# Patient Record
Sex: FEMALE | Race: WHITE | Employment: FULL TIME | URBAN - METROPOLITAN AREA
[De-identification: names, ages, dates, MRNs, and addresses within clinical notes are randomized per-mention and may not be internally consistent; named-entity substitution may affect disease eponyms.]

---

## 2017-11-22 ENCOUNTER — HOSPITAL ENCOUNTER (EMERGENCY)
Age: 47
Discharge: HOME | End: 2017-11-22
Attending: EMERGENCY MEDICINE
Payer: COMMERCIAL

## 2017-11-22 VITALS
SYSTOLIC BLOOD PRESSURE: 136 MMHG | DIASTOLIC BLOOD PRESSURE: 85 MMHG | WEIGHT: 235 LBS | RESPIRATION RATE: 18 BRPM | TEMPERATURE: 97.9 F | OXYGEN SATURATION: 94 % | HEART RATE: 83 BPM | BODY MASS INDEX: 39.11 KG/M2

## 2017-11-22 DIAGNOSIS — N10 ACUTE PYELONEPHRITIS: Primary | ICD-10-CM

## 2017-11-22 LAB
-: ABNORMAL
AMORPHOUS: ABNORMAL
BACTERIA: ABNORMAL
BILIRUBIN URINE: ABNORMAL
CASTS UA: ABNORMAL /LPF
COLOR: YELLOW
COMMENT UA: ABNORMAL
CRYSTALS, UA: ABNORMAL /HPF
EPITHELIAL CELLS UA: ABNORMAL /HPF (ref 0–5)
GLUCOSE URINE: NEGATIVE
KETONES, URINE: ABNORMAL
LEUKOCYTE ESTERASE, URINE: ABNORMAL
MUCUS: ABNORMAL
NITRITE, URINE: POSITIVE
OTHER OBSERVATIONS UA: ABNORMAL
PH UA: 6 (ref 5–8)
PROTEIN UA: ABNORMAL
RBC UA: ABNORMAL /HPF (ref 0–2)
RENAL EPITHELIAL, UA: ABNORMAL /HPF
SPECIFIC GRAVITY UA: 1.02 (ref 1–1.03)
TRICHOMONAS: ABNORMAL
TURBIDITY: ABNORMAL
URINE HGB: NEGATIVE
UROBILINOGEN, URINE: NORMAL
WBC UA: ABNORMAL /HPF (ref 0–5)
YEAST: ABNORMAL

## 2017-11-22 PROCEDURE — 87086 URINE CULTURE/COLONY COUNT: CPT

## 2017-11-22 PROCEDURE — 81001 URINALYSIS AUTO W/SCOPE: CPT

## 2017-11-22 PROCEDURE — 87088 URINE BACTERIA CULTURE: CPT

## 2017-11-22 PROCEDURE — 96372 THER/PROPH/DIAG INJ SC/IM: CPT

## 2017-11-22 PROCEDURE — 87186 SC STD MICRODIL/AGAR DIL: CPT

## 2017-11-22 PROCEDURE — 6360000002 HC RX W HCPCS: Performed by: EMERGENCY MEDICINE

## 2017-11-22 PROCEDURE — 99283 EMERGENCY DEPT VISIT LOW MDM: CPT

## 2017-11-22 RX ORDER — CEFTRIAXONE 1 G/1
1 INJECTION, POWDER, FOR SOLUTION INTRAMUSCULAR; INTRAVENOUS ONCE
Status: COMPLETED | OUTPATIENT
Start: 2017-11-22 | End: 2017-11-22

## 2017-11-22 RX ORDER — ESTRADIOL 1 MG/1
1 TABLET ORAL DAILY
COMMUNITY
Start: 2017-11-07 | End: 2021-05-07

## 2017-11-22 RX ORDER — GLIMEPIRIDE 4 MG/1
4 TABLET ORAL 2 TIMES DAILY
COMMUNITY
Start: 2017-11-07 | End: 2021-05-07

## 2017-11-22 RX ORDER — NITROFURANTOIN 25; 75 MG/1; MG/1
100 CAPSULE ORAL 2 TIMES DAILY
Qty: 10 CAPSULE | Refills: 0 | Status: SHIPPED | OUTPATIENT
Start: 2017-11-22 | End: 2017-11-27

## 2017-11-22 RX ORDER — METHYLPHENIDATE HYDROCHLORIDE 20 MG/1
20 CAPSULE, EXTENDED RELEASE ORAL EVERY MORNING
COMMUNITY
End: 2018-01-10

## 2017-11-22 RX ADMIN — CEFTRIAXONE SODIUM 1 G: 1 INJECTION, POWDER, FOR SOLUTION INTRAMUSCULAR; INTRAVENOUS at 14:52

## 2017-11-22 ASSESSMENT — ENCOUNTER SYMPTOMS
ABDOMINAL PAIN: 0
BLOOD IN STOOL: 0
VOMITING: 0
BACK PAIN: 1

## 2017-11-22 ASSESSMENT — PAIN DESCRIPTION - ORIENTATION: ORIENTATION: LOWER;MID

## 2017-11-22 ASSESSMENT — PAIN DESCRIPTION - PAIN TYPE: TYPE: ACUTE PAIN

## 2017-11-22 ASSESSMENT — PAIN DESCRIPTION - LOCATION: LOCATION: BACK

## 2017-11-22 ASSESSMENT — PAIN SCALES - GENERAL: PAINLEVEL_OUTOF10: 7

## 2017-11-22 NOTE — ED PROVIDER NOTES
[employer] [facility]  [address] [place] [address]  Phone: [phone]  Fax: [phone]        Pt Name: Pepper Haile  MRN: [id number]  [place] 1970  Date of evaluation: 17      CHIEF COMPLAINT       Chief Complaint   Patient presents with    Back Pain         HISTORY OF PRESENT ILLNESS  (Location/Symptom, Timing/Onset, Context/Setting, Quality, Duration, Modifying Factors, Severity.)    Pepper Haile is a 50 y.o. female who presents With right lower back pain. The patient states she awoke with right lower back pain starting this morning. The patient denies any trauma. No blood in the urine or stool. The patient states that certain movements makes her pain worse nothing makes it better. The patient denies any dysuria, no fever no chills. Has a history of kidney stones but states this feels different than her previous kidney stones      REVIEW OF SYSTEMS    (2-9 systems for level 4, 10 or more for level 5)     Review of Systems   Constitutional: Negative for chills and fever. Gastrointestinal: Negative for abdominal pain, blood in stool and vomiting. Genitourinary: Negative for dysuria and hematuria. Musculoskeletal: Positive for back pain. PAST MEDICAL HISTORY    has a past medical history of Diabetes mellitus ([address] and Kidney stone. SURGICAL HISTORY      has a past surgical history that includes  section; Cholecystectomy; and Hysterectomy. CURRENT MEDICATIONS       Previous Medications    ESCITALOPRAM OXALATE (LEXAPRO PO)    Take  by mouth. ESTRADIOL (ESTRACE) 1 MG TABLET    Take 1 mg by mouth daily    GLIMEPIRIDE (AMARYL) 4 MG TABLET    Take 4 mg by mouth 2 times daily    INSULIN DETEMIR (LEVEMIR SC)    Inject  into the skin. METHYLPHENIDATE (RITALIN LA) 20 MG EXTENDED RELEASE CAPSULE    Take 20 mg by mouth every morning . ALLERGIES     is allergic to zithromax [azithromycin].     FAMILY HISTORY     has no family status information on file. family history is not on file. SOCIAL HISTORY      reports that she has been smoking. She has been smoking about 1.00 pack per day. She has never used smokeless tobacco. She reports that she drinks alcohol. She reports that she does not use drugs. PHYSICAL EXAM    (up to 7 for level 4, 8 or more for level 5)   INITIAL VITALS:  weight is 106.6 kg (235 lb). Her oral temperature is 97.9 °F (36.6 °C). Her blood pressure is 136/85 and her pulse is 83. Her respiration is 18 and oxygen saturation is 94%. Physical Exam   Constitutional: She appears well-developed and well-nourished. HENT:   Head: Normocephalic and atraumatic. Eyes: Conjunctivae are normal.   Neck: Normal range of motion. Neck supple. Cardiovascular: Normal rate, regular rhythm and normal heart sounds. Pulmonary/Chest: Effort normal. No respiratory distress. Patient is noted to have an occasional expiratory wheeze and scattered the patient states that she is just getting over cold is feeling much better his partners, or upper respiratory type symptoms   Abdominal: Soft. Bowel sounds are normal. She exhibits no distension and no mass. There is no tenderness. There is no rebound and no guarding. Musculoskeletal: Normal range of motion. Right sacroiliac area tenderness no signs or symptoms of cauda equina   Neurological: She is alert. GCS of 15 with no focal deficits appreciated   Skin: Skin is warm and dry. No rash noted. Psychiatric: She has a normal mood and affect. Nursing note and vitals reviewed. DIFFERENTIAL DIAGNOSIS/ MDM:     I will check a UA    DIAGNOSTIC RESULTS         RADIOLOGY:   Non-plain film images such as CT, Ultrasound and MRI are read by the radiologist. Plain radiographic images are visualized and the radiologist interpretations are reviewed as follows:         Interpretation per the Radiologist below, if available at the time of this note:     The patient refuses any imaging at this time    LABS:  Results for orders placed or performed during the hospital encounter of 11/22/17   UA W/REFLEX CULTURE   Result Value Ref Range    Color, UA YELLOW YEL    Turbidity UA TURBID (A) CLEAR    Glucose, Ur NEGATIVE NEG    Bilirubin Urine NEGATIVE  Verified by ictotest. (A) NEG    Ketones, Urine TRACE (A) NEG    Specific Gravity, UA 1.025 1.005 - 1.030    Urine Hgb NEGATIVE NEG    pH, UA 6.0 5.0 - 8.0    Protein, UA 1+ (A) NEG    Urobilinogen, Urine Normal NORM    Nitrite, Urine POSITIVE (A) NEG    Leukocyte Esterase, Urine TRACE (A) NEG    Urinalysis Comments NOT REPORTED    Microscopic Urinalysis   Result Value Ref Range    -          WBC, UA 50  0 - 5 /HPF    RBC, UA 2 TO 5 0 - 2 /HPF    Casts UA NOT REPORTED /LPF    Crystals UA NOT REPORTED NONE /HPF    Epithelial Cells UA 2 TO 5 0 - 5 /HPF    Renal Epithelial, Urine NOT REPORTED 0 /HPF    Bacteria, UA MANY (A) NONE    Mucus, UA 2+ (A) NONE    Trichomonas, UA NOT REPORTED NONE    Amorphous, UA NOT REPORTED NONE    Other Observations UA Culture ordered based on defined criteria. (A) NREQ    Yeast, UA NOT REPORTED NONE       The patient is refusing any blood work at this time    [address] [place] [address]:   Vitals:    Vitals:    11/22/17 1329   BP: 136/85   Pulse: 83   Resp: 18   Temp: 97.9 °F (36.6 °C)   TempSrc: Oral   SpO2: 94%   Weight: 106.6 kg (235 lb)     -------------------------  BP: 136/85, Temp: 97.9 °F (36.6 °C), Pulse: 83, Resp: 18      RE-EVALUATION:  The patient presents with back pain. The urine does show infection. I did recommend that we check some labs as well as a CT scan. Given that the patient has a history of stones which the patient politely refuses as the patient states that she needs to get things ready for the holidays.   The patient states that ever symptoms worsen, she will return to the emergency Department at that point The patient is agreeable to receiving an injection of antibiotics for her urinary

## 2017-11-22 NOTE — ED NOTES
Pt to ED with complaint of mid to lower back pain. Pt states that she woke up this am with the pain, she denies any injury or trauma. Pt denies any bowel or bladder difficulty. She reports a hx of kidney stones in the past and states that this feels different than her kidney stone pain. Pt shows no sign of distress. She states that movement makes the pain worse.  Pt has taken motrin and reports no relief of pain     Freddie Umana RN  11/22/17 7330

## 2017-11-22 NOTE — ED NOTES
Pt up to restroom, instructed on CC technique, verbalizes understanding     Alee Wong RN  11/22/17 9473

## 2017-11-24 ENCOUNTER — HOSPITAL ENCOUNTER (EMERGENCY)
Age: 47
Discharge: HOME | End: 2017-11-24
Attending: EMERGENCY MEDICINE
Payer: COMMERCIAL

## 2017-11-24 ENCOUNTER — APPOINTMENT (OUTPATIENT)
Dept: CT IMAGING | Age: 47
End: 2017-11-24
Payer: COMMERCIAL

## 2017-11-24 VITALS
DIASTOLIC BLOOD PRESSURE: 79 MMHG | BODY MASS INDEX: 41.65 KG/M2 | HEIGHT: 65 IN | TEMPERATURE: 97.9 F | RESPIRATION RATE: 18 BRPM | WEIGHT: 250 LBS | SYSTOLIC BLOOD PRESSURE: 157 MMHG | OXYGEN SATURATION: 96 % | HEART RATE: 86 BPM

## 2017-11-24 DIAGNOSIS — N10 ACUTE PYELONEPHRITIS: Primary | ICD-10-CM

## 2017-11-24 LAB
ABSOLUTE EOS #: 0.6 K/UL (ref 0–0.4)
ABSOLUTE IMMATURE GRANULOCYTE: ABNORMAL K/UL (ref 0–0.3)
ABSOLUTE LYMPH #: 4.9 K/UL (ref 1–4.8)
ABSOLUTE MONO #: 0.7 K/UL (ref 0.1–1.2)
ANION GAP SERPL CALCULATED.3IONS-SCNC: 13 MMOL/L (ref 9–17)
BASOPHILS # BLD: 1 % (ref 0–2)
BASOPHILS ABSOLUTE: 0.2 K/UL (ref 0–0.2)
BILIRUBIN URINE: NEGATIVE
BUN BLDV-MCNC: 11 MG/DL (ref 6–20)
BUN/CREAT BLD: ABNORMAL (ref 9–20)
CALCIUM SERPL-MCNC: 9.3 MG/DL (ref 8.6–10.4)
CHLORIDE BLD-SCNC: 102 MMOL/L (ref 98–107)
CO2: 28 MMOL/L (ref 20–31)
COLOR: YELLOW
COMMENT UA: NORMAL
CREAT SERPL-MCNC: 0.56 MG/DL (ref 0.5–0.9)
CULTURE: ABNORMAL
CULTURE: ABNORMAL
DIFFERENTIAL TYPE: ABNORMAL
EOSINOPHILS RELATIVE PERCENT: 4 % (ref 1–4)
GFR AFRICAN AMERICAN: >60 ML/MIN
GFR NON-AFRICAN AMERICAN: >60 ML/MIN
GFR SERPL CREATININE-BSD FRML MDRD: ABNORMAL ML/MIN/{1.73_M2}
GFR SERPL CREATININE-BSD FRML MDRD: ABNORMAL ML/MIN/{1.73_M2}
GLUCOSE BLD-MCNC: 112 MG/DL (ref 70–99)
GLUCOSE URINE: NEGATIVE
HCT VFR BLD CALC: 42 % (ref 36–46)
HEMOGLOBIN: 14.5 G/DL (ref 12–16)
IMMATURE GRANULOCYTES: ABNORMAL %
KETONES, URINE: NEGATIVE
LEUKOCYTE ESTERASE, URINE: NEGATIVE
LYMPHOCYTES # BLD: 33 % (ref 24–44)
Lab: ABNORMAL
MCH RBC QN AUTO: 32 PG (ref 26–34)
MCHC RBC AUTO-ENTMCNC: 34.5 G/DL (ref 31–37)
MCV RBC AUTO: 92.5 FL (ref 80–100)
MONOCYTES # BLD: 5 % (ref 2–11)
NITRITE, URINE: NEGATIVE
ORGANISM: ABNORMAL
PDW BLD-RTO: 12.2 % (ref 12.5–15.4)
PH UA: 6 (ref 5–8)
PLATELET # BLD: 336 K/UL (ref 140–450)
PLATELET ESTIMATE: ABNORMAL
PMV BLD AUTO: 6.7 FL (ref 6–12)
POTASSIUM SERPL-SCNC: 3.8 MMOL/L (ref 3.7–5.3)
PROTEIN UA: NEGATIVE
RBC # BLD: 4.54 M/UL (ref 4–5.2)
RBC # BLD: ABNORMAL 10*6/UL
SEG NEUTROPHILS: 57 % (ref 36–66)
SEGMENTED NEUTROPHILS ABSOLUTE COUNT: 8.5 K/UL (ref 1.8–7.7)
SODIUM BLD-SCNC: 143 MMOL/L (ref 135–144)
SPECIFIC GRAVITY UA: 1.02 (ref 1–1.03)
SPECIMEN DESCRIPTION: ABNORMAL
SPECIMEN DESCRIPTION: ABNORMAL
STATUS: ABNORMAL
TURBIDITY: CLEAR
URINE HGB: NEGATIVE
UROBILINOGEN, URINE: NORMAL
WBC # BLD: 14.9 K/UL (ref 3.5–11)
WBC # BLD: ABNORMAL 10*3/UL

## 2017-11-24 PROCEDURE — 6370000000 HC RX 637 (ALT 250 FOR IP): Performed by: PHYSICIAN ASSISTANT

## 2017-11-24 PROCEDURE — 85025 COMPLETE CBC W/AUTO DIFF WBC: CPT

## 2017-11-24 PROCEDURE — 99284 EMERGENCY DEPT VISIT MOD MDM: CPT

## 2017-11-24 PROCEDURE — 36415 COLL VENOUS BLD VENIPUNCTURE: CPT

## 2017-11-24 PROCEDURE — 74176 CT ABD & PELVIS W/O CONTRAST: CPT

## 2017-11-24 PROCEDURE — 80048 BASIC METABOLIC PNL TOTAL CA: CPT

## 2017-11-24 RX ORDER — SULFAMETHOXAZOLE AND TRIMETHOPRIM 800; 160 MG/1; MG/1
1 TABLET ORAL ONCE
Status: COMPLETED | OUTPATIENT
Start: 2017-11-24 | End: 2017-11-24

## 2017-11-24 RX ORDER — SULFAMETHOXAZOLE AND TRIMETHOPRIM 800; 160 MG/1; MG/1
1 TABLET ORAL 2 TIMES DAILY
Qty: 19 TABLET | Refills: 0 | Status: SHIPPED | OUTPATIENT
Start: 2017-11-24 | End: 2017-12-04

## 2017-11-24 RX ORDER — HYDROCODONE BITARTRATE AND ACETAMINOPHEN 5; 325 MG/1; MG/1
1 TABLET ORAL EVERY 8 HOURS PRN
Qty: 15 TABLET | Refills: 0 | Status: SHIPPED | OUTPATIENT
Start: 2017-11-24 | End: 2017-12-01

## 2017-11-24 RX ORDER — 0.9 % SODIUM CHLORIDE 0.9 %
1000 INTRAVENOUS SOLUTION INTRAVENOUS ONCE
Status: DISCONTINUED | OUTPATIENT
Start: 2017-11-24 | End: 2017-11-24

## 2017-11-24 RX ADMIN — SULFAMETHOXAZOLE AND TRIMETHOPRIM 1 TABLET: 800; 160 TABLET ORAL at 16:28

## 2017-11-24 ASSESSMENT — ENCOUNTER SYMPTOMS
ABDOMINAL PAIN: 0
SORE THROAT: 0
EYE REDNESS: 0
VOMITING: 0
NAUSEA: 0
EYE DISCHARGE: 0
COUGH: 0
SHORTNESS OF BREATH: 0

## 2017-11-24 ASSESSMENT — PAIN SCALES - GENERAL: PAINLEVEL_OUTOF10: 9

## 2017-11-24 ASSESSMENT — PAIN DESCRIPTION - LOCATION: LOCATION: BACK

## 2017-11-24 ASSESSMENT — PAIN DESCRIPTION - ORIENTATION: ORIENTATION: RIGHT

## 2017-11-24 NOTE — ED PROVIDER NOTES
[facility] ED  [address] [address]  Phone: [phone]  Fax: [phone]      Attending Physician Attestation    I performed a history and physical examination of the patient and discussed management with the mid level provider. I reviewed the mid level provider's note and agree with the documented findings and plan of care. Any areas of disagreement are noted on the chart. I was personally present for the key portions of any procedures. I have documented in the chart those procedures where I was not present during the key portions. I have reviewed the emergency nurses triage note. I agree with the chief complaint, past medical history, past surgical history, allergies, medications, social and family history as documented unless otherwise noted below. Documentation of the HPI, Physical Exam and Medical Decision Making performed by mid level providers is based on my personal performance of the HPI, PE and MDM. For Physician Assistant/ Nurse Practitioner cases/documentation I have personally evaluated this patient and have completed at least one if not all key elements of the E/M (history, physical exam, and MDM). Additional findings are as noted. CHIEF COMPLAINT       Chief Complaint   Patient presents with    Back Pain         HISTORY OF PRESENT ILLNESS    Evon Wilson is a 50 y.o. female who presents Complaining of right-sided flank pain. She was evaluated here couple days ago and diagnosed with pyelonephritis. She reports despite being on Macrobid, she feels that she is getting worse. Pain is getting worse and now it is more in the right flank. Pain on scale of 0-10 is a 9. PAST MEDICAL HISTORY    has a past medical history of Diabetes mellitus ([address] and Kidney stone. SURGICAL HISTORY      has a past surgical history that includes  section; Cholecystectomy; and Hysterectomy.     CURRENT MEDICATIONS       Previous Medications ESCITALOPRAM OXALATE (LEXAPRO PO)    Take  by mouth. ESTRADIOL (ESTRACE) 1 MG TABLET    Take 1 mg by mouth daily    GLIMEPIRIDE (AMARYL) 4 MG TABLET    Take 4 mg by mouth 2 times daily    INSULIN DETEMIR (LEVEMIR SC)    Inject  into the skin. METHYLPHENIDATE (RITALIN LA) 20 MG EXTENDED RELEASE CAPSULE    Take 20 mg by mouth every morning . NITROFURANTOIN, MACROCRYSTAL-MONOHYDRATE, (MACROBID) 100 MG CAPSULE    Take 1 capsule by mouth 2 times daily for 5 days       ALLERGIES     is allergic to zithromax [azithromycin]. FAMILY HISTORY     has no family status information on file. family history is not on file. SOCIAL HISTORY      reports that she has been smoking. She has been smoking about 1.00 pack per day. She has never used smokeless tobacco. She reports that she drinks alcohol. She reports that she does not use drugs. PHYSICAL EXAM     INITIAL VITALS:  height is 5' 5\" (1.651 m) and weight is 113.4 kg (250 lb). Her oral temperature is 97.9 °F (36.6 °C). Her blood pressure is 157/79 (abnormal) and her pulse is 86. Patient has moderate status CVA tenderness. She is next or wheeze in all lung fields which she reports is her baseline. Heart is regular. She is not toxic appearing. DIAGNOSTIC RESULTS     RADIOLOGY:   Non-plain film images such as CT, Ultrasound and MRI are read by the radiologist. Reford Chyle radiographic images are visualized and the radiologist interpretations are reviewed as follows:     Ct Abdomen Pelvis Wo Iv Contrast Additional Contrast? None    Result Date: 11/24/2017  EXAMINATION: CT OF THE ABDOMEN AND PELVIS WITHOUT CONTRAST 11/24/2017 3:55 pm TECHNIQUE: CT of the abdomen and pelvis was performed without the administration of intravenous contrast. Multiplanar reformatted images are provided for review. Dose modulation, iterative reconstruction, and/or weight based adjustment of the mA/kV was utilized to reduce the radiation dose to as low as reasonably achievable. COMPARISON: January 4, 2006 HISTORY: ORDERING SYSTEM PROVIDED HISTORY: right flank pain; Dx UTI on 11/22/17; H/O kidney stones TECHNOLOGIST PROVIDED HISTORY: Additional Contrast?->None Acuity: Acute Type of Exam: Initial Additional signs and symptoms: dx UTI x2 days ago Relevant Medical/Surgical History: sx 2 c-sections FINDINGS: Lower Chest: Clear Organs: There is a fat containing periumbilical hernia and ventral hernia. The liver, spleen, pancreas, and adrenals appear normal.  The gallbladder is not identified. Multiple punctate nonobstructing nephroliths are noted bilaterally. Left nephrostomy tube is been removed. Previously noted parenchymal hemorrhage has resolved. The bladder appears normal. GI/Bowel: The stomache,small bowel, and colon appear normal. The appendix appears normal pre Pelvis: Normal Peritoneum/Retroperitoneum: The abdominal aorta and iliac arteries are normal in caliber. There is no pathologic adenopathy. Bones/Soft Tissues: Normal     Bilateral punctate nonobstructing nephroliths. Otherwise no acute disease.        LABS:  Results for orders placed or performed during the hospital encounter of 11/24/17   CBC Auto Differential   Result Value Ref Range    WBC 14.9 (H) 3.5 - 11.0 k/uL    RBC 4.54 4.0 - 5.2 m/uL    Hemoglobin 14.5 12.0 - 16.0 g/dL    Hematocrit 42.0 36 - 46 %    MCV 92.5 80 - 100 fL    MCH 32.0 26 - 34 pg    MCHC 34.5 31 - 37 g/dL    RDW 12.2 (L) 12.5 - 15.4 %    Platelets 577 825 - 855 k/uL    MPV 6.7 6.0 - 12.0 fL    Differential Type NOT REPORTED     Seg Neutrophils 57 36 - 66 %    Lymphocytes 33 24 - 44 %    Monocytes 5 2 - 11 %    Eosinophils % 4 1 - 4 %    Basophils 1 0 - 2 %    Immature Granulocytes NOT REPORTED 0 %    Segs Absolute 8.50 (H) 1.8 - 7.7 k/uL    Absolute Lymph # 4.90 (H) 1.0 - 4.8 k/uL    Absolute Mono # 0.70 0.1 - 1.2 k/uL    Absolute Eos # 0.60 (H) 0.0 - 0.4 k/uL    Basophils # 0.20 0.0 - 0.2 k/uL    Absolute Immature Granulocyte NOT REPORTED 0.00 - 0.30 k/uL    WBC Morphology NOT REPORTED     RBC Morphology NOT REPORTED     Platelet Estimate NOT REPORTED    Basic Metabolic Panel   Result Value Ref Range    Glucose 112 (H) 70 - 99 mg/dL    BUN 11 6 - 20 mg/dL    CREATININE 0.56 0.50 - 0.90 mg/dL    Bun/Cre Ratio NOT REPORTED 9 - 20    Calcium 9.3 8.6 - 10.4 mg/dL    Sodium 143 135 - 144 mmol/L    Potassium 3.8 3.7 - 5.3 mmol/L    Chloride 102 98 - 107 mmol/L    CO2 28 20 - 31 mmol/L    Anion Gap 13 9 - 17 mmol/L    GFR Non-African American >60 >60 mL/min    GFR African American >60 >60 mL/min    GFR Comment          GFR Staging NOT REPORTED    UA W/REFLEX CULTURE   Result Value Ref Range    Color, UA YELLOW YEL    Turbidity UA CLEAR CLEAR    Glucose, Ur NEGATIVE NEG    Bilirubin Urine NEGATIVE NEG    Ketones, Urine NEGATIVE NEG    Specific Gravity, UA 1.025 1.005 - 1.030    Urine Hgb NEGATIVE NEG    pH, UA 6.0 5.0 - 8.0    Protein, UA NEGATIVE NEG    Urobilinogen, Urine Normal NORM    Nitrite, Urine NEGATIVE NEG    Leukocyte Esterase, Urine NEGATIVE NEG    Urinalysis Comments       Microscopic exam not performed based on chemical results unless requested in           [address] [place] [address] [place]:   Vitals:    Vitals:    11/24/17 1511   BP: (!) 157/79   Pulse: 86   Temp: 97.9 °F (36.6 °C)   TempSrc: Oral   Weight: 113.4 kg (250 lb)   Height: 5' 5\" (1.651 m)     -------------------------  BP: (!) 157/79, Temp: 97.9 °F (36.6 °C), Pulse: 86,        PERTINENT ATTENDING PHYSICIAN COMMENTS:    Exam and history is consistent with pyelonephritis. Results are consistent with pyelonephritis. We will change her antibiotic to Bactrim. She has been given a prescription for Norco.  She has been given a narcotic warning will be discharged. (Please note that portions of this note were completed with a voice recognition program.  Efforts were made to edit the dictations but occasionally words are mis-transcribed.)    Coelho MD, F.A.C.E.P.   Attending Emergency Medicine

## 2017-11-24 NOTE — ED PROVIDER NOTES
nonobstructing nephroliths are noted bilaterally.  Left  nephrostomy tube is been removed.  Previously noted parenchymal hemorrhage  has resolved. The bladder appears normal.    GI/Bowel: The stomache,small bowel, and colon appear normal. The appendix appears  normal pre    Pelvis: Normal    Peritoneum/Retroperitoneum: The abdominal aorta and iliac arteries are normal  in caliber. There is no pathologic adenopathy.     Bones/Soft Tissues: Normal              LABS:  Results for orders placed or performed during the hospital encounter of 11/24/17   CBC Auto Differential   Result Value Ref Range    WBC 14.9 (H) 3.5 - 11.0 k/uL    RBC 4.54 4.0 - 5.2 m/uL    Hemoglobin 14.5 12.0 - 16.0 g/dL    Hematocrit 42.0 36 - 46 %    MCV 92.5 80 - 100 fL    MCH 32.0 26 - 34 pg    MCHC 34.5 31 - 37 g/dL    RDW 12.2 (L) 12.5 - 15.4 %    Platelets 675 031 - 050 k/uL    MPV 6.7 6.0 - 12.0 fL    Differential Type NOT REPORTED     Seg Neutrophils 57 36 - 66 %    Lymphocytes 33 24 - 44 %    Monocytes 5 2 - 11 %    Eosinophils % 4 1 - 4 %    Basophils 1 0 - 2 %    Immature Granulocytes NOT REPORTED 0 %    Segs Absolute 8.50 (H) 1.8 - 7.7 k/uL    Absolute Lymph # 4.90 (H) 1.0 - 4.8 k/uL    Absolute Mono # 0.70 0.1 - 1.2 k/uL    Absolute Eos # 0.60 (H) 0.0 - 0.4 k/uL    Basophils # 0.20 0.0 - 0.2 k/uL    Absolute Immature Granulocyte NOT REPORTED 0.00 - 0.30 k/uL    WBC Morphology NOT REPORTED     RBC Morphology NOT REPORTED     Platelet Estimate NOT REPORTED    Basic Metabolic Panel   Result Value Ref Range    Glucose 112 (H) 70 - 99 mg/dL    BUN 11 6 - 20 mg/dL    CREATININE 0.56 0.50 - 0.90 mg/dL    Bun/Cre Ratio NOT REPORTED 9 - 20    Calcium 9.3 8.6 - 10.4 mg/dL    Sodium 143 135 - 144 mmol/L    Potassium 3.8 3.7 - 5.3 mmol/L    Chloride 102 98 - 107 mmol/L    CO2 28 20 - 31 mmol/L    Anion Gap 13 9 - 17 mmol/L    GFR Non-African American >60 >60 mL/min    GFR African American >60 >60 mL/min    GFR Comment          GFR Staging NOT ANGIE  11/24/17 2016

## 2017-11-29 ENCOUNTER — HOSPITAL ENCOUNTER (EMERGENCY)
Age: 47
Discharge: HOME | End: 2017-11-29
Attending: EMERGENCY MEDICINE
Payer: COMMERCIAL

## 2017-11-29 VITALS
BODY MASS INDEX: 41.65 KG/M2 | OXYGEN SATURATION: 93 % | DIASTOLIC BLOOD PRESSURE: 68 MMHG | WEIGHT: 250 LBS | SYSTOLIC BLOOD PRESSURE: 130 MMHG | TEMPERATURE: 97 F | HEIGHT: 65 IN | RESPIRATION RATE: 18 BRPM | HEART RATE: 86 BPM

## 2017-11-29 DIAGNOSIS — M54.50 ACUTE RIGHT-SIDED LOW BACK PAIN WITHOUT SCIATICA: Primary | ICD-10-CM

## 2017-11-29 LAB
BILIRUBIN URINE: NEGATIVE
COLOR: YELLOW
COMMENT UA: ABNORMAL
GLUCOSE URINE: ABNORMAL
KETONES, URINE: ABNORMAL
LEUKOCYTE ESTERASE, URINE: NEGATIVE
NITRITE, URINE: NEGATIVE
PH UA: 6 (ref 5–8)
PROTEIN UA: NEGATIVE
SPECIFIC GRAVITY UA: 1.02 (ref 1–1.03)
TURBIDITY: CLEAR
URINE HGB: NEGATIVE
UROBILINOGEN, URINE: NORMAL

## 2017-11-29 PROCEDURE — 99283 EMERGENCY DEPT VISIT LOW MDM: CPT

## 2017-11-29 ASSESSMENT — PAIN DESCRIPTION - LOCATION: LOCATION: BACK

## 2017-11-29 ASSESSMENT — PAIN DESCRIPTION - ORIENTATION: ORIENTATION: LOWER

## 2017-11-29 ASSESSMENT — PAIN SCALES - GENERAL: PAINLEVEL_OUTOF10: 3

## 2017-11-29 ASSESSMENT — PAIN DESCRIPTION - DESCRIPTORS: DESCRIPTORS: ACHING

## 2017-11-29 NOTE — ED PROVIDER NOTES
(NORCO) 5-325 MG PER TABLET    Take 1 tablet by mouth every 8 hours as needed for Pain . INSULIN DETEMIR (LEVEMIR SC)    Inject  into the skin. METHYLPHENIDATE (RITALIN LA) 20 MG EXTENDED RELEASE CAPSULE    Take 20 mg by mouth every morning . SULFAMETHOXAZOLE-TRIMETHOPRIM (BACTRIM DS) 800-160 MG PER TABLET    Take 1 tablet by mouth 2 times daily for 10 days       ALLERGIES     is allergic to zithromax [azithromycin]. FAMILY HISTORY     has no family status information on file. family history is not on file. SOCIAL HISTORY      reports that she has been smoking. She has been smoking about 1.00 pack per day. She has never used smokeless tobacco. She reports that she drinks alcohol. She reports that she does not use drugs. PHYSICAL EXAM     INITIAL VITALS:  height is 5' 5\" (1.651 m) and weight is 113.4 kg (250 lb). Her temporal temperature is 97 °F (36.1 °C). Her blood pressure is 130/68 and her pulse is 86. Her respiration is 18 and oxygen saturation is 93%. Constitutional: Alert, oriented x3, nontoxic, answering questions appropriately, acting properly for age, in no acute distress   HEENT: Extraocular muscles intact, mucus membranes moist,  ,   Neck: Trachea midline,    Cardiovascular: Regular rhythm and rate no S3, S4, or murmurs   Respiratory: Clear to auscultation bilaterally no wheezes, rhonchi, rales, no respiratory distress   Gastrointestinal: Soft, nontender, nondistended, positive bowel sounds. No rebound, rigidity, or guarding. Musculoskeletal: No extremity pain or swelling no midline back tenderness to palpation. Decreased range of motion lumbar spine. No ecchymosis or rash. Neurologic: Moving all 4 extremities without difficulty there are no gross focal neurologic deficits   Skin: Warm and dry     Physical Exam  DIFFERENTIAL DIAGNOSIS/ MDM:     No gross focal neurologic deficits. Low back pain with a history of pyelonephritis.   Will recheck urinalysis    DIAGNOSTIC RESULTS     EKG: All EKG's are interpreted by the Emergency Department Physician who either signs or Co-signs this chart in the absence of a cardiologist.        Not indicated unless otherwise documented above    LABS:  Results for orders placed or performed during the hospital encounter of 11/29/17   UA W/REFLEX CULTURE   Result Value Ref Range    Color, UA YELLOW YEL    Turbidity UA CLEAR CLEAR    Glucose, Ur TRACE (A) NEG    Bilirubin Urine NEGATIVE NEG    Ketones, Urine TRACE (A) NEG    Specific Gravity, UA 1.025 1.005 - 1.030    Urine Hgb NEGATIVE NEG    pH, UA 6.0 5.0 - 8.0    Protein, UA NEGATIVE NEG    Urobilinogen, Urine Normal NORM    Nitrite, Urine NEGATIVE NEG    Leukocyte Esterase, Urine NEGATIVE NEG    Urinalysis Comments       Microscopic exam not performed based on chemical results unless requested in       Not indicated unless otherwise documented above    RADIOLOGY:   I reviewed the radiologist interpretations:    No orders to display       Not indicated unless otherwise documented above    EMERGENCY DEPARTMENT COURSE:     The patient was given the following medications:  No orders of the defined types were placed in this encounter. Vitals:    Vitals:    11/29/17 1527   BP: 130/68   Pulse: 86   Resp: 18   Temp: 97 °F (36.1 °C)   TempSrc: Temporal   SpO2: 93%   Weight: 113.4 kg (250 lb)   Height: 5' 5\" (1.651 m)     -------------------------  /68   Pulse 86   Temp 97 °F (36.1 °C) (Temporal)   Resp 18   Ht 5' 5\" (1.651 m)   Wt 113.4 kg (250 lb)   SpO2 93%   BMI 41.60 kg/m²     Urinalysis negative. Continue antibiotics. Continue pain medicine. Work note given. He is needed return if worsening symptoms or any other concerns. I have reviewed the disposition diagnosis with the patient and or their family/guardian. I have answered their questions and given discharge instructions.  They voiced understanding of these instructions and did not have any further questions or complaints. CRITICAL CARE:    None    CONSULTS:    None    PROCEDURES:    None      OARRS Report if indicated    Attestation: The Prescription Monitoring Report for this patient was reviewed today. (Myles Khalil, DO)  Documentation: No signs of potential drug abuse or diversion identified. (Myles Khalil, DO)        FINAL IMPRESSION      1.  Acute right-sided low back pain without sciatica          DISPOSITION/PLAN   DISPOSITION Decision to Discharge      PATIENT REFERRED TO:  Amanda Paul, [address] [facility] [address]    Schedule an appointment as soon as possible for a visit in 3 days        DISCHARGE MEDICATIONS:  New Prescriptions    No medications on file       (Please note that portions of this note were completed with a voice recognition program.  Efforts were made to edit the dictations but occasionally words are mis-transcribed.)    [place] DO  Attending Emergency Physician       Melissa Nevarez DO  11/29/17 2279

## 2017-11-29 NOTE — ED NOTES
Dr. Quyen Kwong at bedside discussing plans for discharge.       [facility], RN  11/29/17 8906

## 2017-11-29 NOTE — ED NOTES
To ED c/o low back pain. Sts it has been on going. Was seen here last week et diagnosed with a \"kidney infection\". Sts she is taking norco for pain et continues her bactrim but has increased lower back pain when at work. Sts she has left work et need a a work note. Denies any increase in her pain since previous visit. Denies fever, vomiting, dysuria. Is alert, oriented. Skin warm, dry, pink. Resp nonlabored, reg.       Riana Fernandez RN  11/29/17 4622

## 2018-01-08 ENCOUNTER — HOSPITAL ENCOUNTER (OUTPATIENT)
Dept: GENERAL RADIOLOGY | Age: 48
Discharge: HOME | End: 2018-01-08
Payer: COMMERCIAL

## 2018-01-08 ENCOUNTER — HOSPITAL ENCOUNTER (OUTPATIENT)
Age: 48
Discharge: HOME | End: 2018-01-08
Payer: COMMERCIAL

## 2018-01-08 DIAGNOSIS — N20.0 KIDNEY STONE: ICD-10-CM

## 2018-01-08 PROCEDURE — 74018 RADEX ABDOMEN 1 VIEW: CPT

## 2018-01-10 PROBLEM — R35.0 FREQUENCY OF URINATION: Status: ACTIVE | Noted: 2018-01-10

## 2018-01-10 PROBLEM — N20.0 BILATERAL NEPHROLITHIASIS: Status: ACTIVE | Noted: 2018-01-10

## 2018-01-26 ENCOUNTER — HOSPITAL ENCOUNTER (EMERGENCY)
Age: 48
Discharge: HOME | End: 2018-01-26
Attending: EMERGENCY MEDICINE
Payer: COMMERCIAL

## 2018-01-26 VITALS
OXYGEN SATURATION: 96 % | HEART RATE: 84 BPM | HEIGHT: 65 IN | TEMPERATURE: 97.9 F | SYSTOLIC BLOOD PRESSURE: 148 MMHG | WEIGHT: 250 LBS | BODY MASS INDEX: 41.65 KG/M2 | RESPIRATION RATE: 18 BRPM | DIASTOLIC BLOOD PRESSURE: 87 MMHG

## 2018-01-26 DIAGNOSIS — R73.9 HYPERGLYCEMIA, UNSPECIFIED: Primary | ICD-10-CM

## 2018-01-26 LAB
ABSOLUTE EOS #: 0 K/UL (ref 0–0.4)
ABSOLUTE IMMATURE GRANULOCYTE: ABNORMAL K/UL (ref 0–0.3)
ABSOLUTE LYMPH #: 2.4 K/UL (ref 1–4.8)
ABSOLUTE MONO #: 0.5 K/UL (ref 0.1–1.2)
ANION GAP SERPL CALCULATED.3IONS-SCNC: 18 MMOL/L (ref 9–17)
BASOPHILS # BLD: 1 % (ref 0–2)
BASOPHILS ABSOLUTE: 0.1 K/UL (ref 0–0.2)
BETA-HYDROXYBUTYRATE: 0.09 MMOL/L (ref 0.02–0.27)
BUN BLDV-MCNC: 22 MG/DL (ref 6–20)
BUN/CREAT BLD: ABNORMAL (ref 9–20)
CALCIUM SERPL-MCNC: 10 MG/DL (ref 8.6–10.4)
CHLORIDE BLD-SCNC: 93 MMOL/L (ref 98–107)
CO2: 23 MMOL/L (ref 20–31)
CREAT SERPL-MCNC: 0.78 MG/DL (ref 0.5–0.9)
DIFFERENTIAL TYPE: ABNORMAL
EOSINOPHILS RELATIVE PERCENT: 0 % (ref 1–4)
GFR AFRICAN AMERICAN: >60 ML/MIN
GFR NON-AFRICAN AMERICAN: >60 ML/MIN
GFR SERPL CREATININE-BSD FRML MDRD: ABNORMAL ML/MIN/{1.73_M2}
GFR SERPL CREATININE-BSD FRML MDRD: ABNORMAL ML/MIN/{1.73_M2}
GLUCOSE BLD-MCNC: 421 MG/DL (ref 65–105)
GLUCOSE BLD-MCNC: 534 MG/DL (ref 70–99)
GLUCOSE BLD-MCNC: 551 MG/DL (ref 65–105)
HCT VFR BLD CALC: 46.6 % (ref 36–46)
HEMOGLOBIN: 15.4 G/DL (ref 12–16)
IMMATURE GRANULOCYTES: ABNORMAL %
LYMPHOCYTES # BLD: 14 % (ref 24–44)
MCH RBC QN AUTO: 31 PG (ref 26–34)
MCHC RBC AUTO-ENTMCNC: 33 G/DL (ref 31–37)
MCV RBC AUTO: 94.1 FL (ref 80–100)
MONOCYTES # BLD: 3 % (ref 2–11)
NRBC AUTOMATED: ABNORMAL PER 100 WBC
PDW BLD-RTO: 12.8 % (ref 12.5–15.4)
PLATELET # BLD: 257 K/UL (ref 140–450)
PLATELET ESTIMATE: ABNORMAL
PMV BLD AUTO: 7.5 FL (ref 6–12)
POTASSIUM SERPL-SCNC: 4.8 MMOL/L (ref 3.7–5.3)
RBC # BLD: 4.95 M/UL (ref 4–5.2)
RBC # BLD: ABNORMAL 10*6/UL
SEG NEUTROPHILS: 82 % (ref 36–66)
SEGMENTED NEUTROPHILS ABSOLUTE COUNT: 13.9 K/UL (ref 1.8–7.7)
SODIUM BLD-SCNC: 134 MMOL/L (ref 135–144)
WBC # BLD: 17 K/UL (ref 3.5–11)
WBC # BLD: ABNORMAL 10*3/UL

## 2018-01-26 PROCEDURE — 99284 EMERGENCY DEPT VISIT MOD MDM: CPT

## 2018-01-26 PROCEDURE — 96372 THER/PROPH/DIAG INJ SC/IM: CPT

## 2018-01-26 PROCEDURE — 82010 KETONE BODYS QUAN: CPT

## 2018-01-26 PROCEDURE — 36415 COLL VENOUS BLD VENIPUNCTURE: CPT

## 2018-01-26 PROCEDURE — 85025 COMPLETE CBC W/AUTO DIFF WBC: CPT

## 2018-01-26 PROCEDURE — 82947 ASSAY GLUCOSE BLOOD QUANT: CPT

## 2018-01-26 PROCEDURE — 80048 BASIC METABOLIC PNL TOTAL CA: CPT

## 2018-01-26 PROCEDURE — 6370000000 HC RX 637 (ALT 250 FOR IP): Performed by: EMERGENCY MEDICINE

## 2018-01-26 PROCEDURE — 2580000003 HC RX 258: Performed by: EMERGENCY MEDICINE

## 2018-01-26 RX ORDER — 0.9 % SODIUM CHLORIDE 0.9 %
1000 INTRAVENOUS SOLUTION INTRAVENOUS ONCE
Status: COMPLETED | OUTPATIENT
Start: 2018-01-26 | End: 2018-01-26

## 2018-01-26 RX ORDER — CEPHALEXIN 500 MG/1
500 CAPSULE ORAL 4 TIMES DAILY
COMMUNITY
End: 2021-05-07

## 2018-01-26 RX ORDER — PREDNISONE 20 MG/1
20 TABLET ORAL 2 TIMES DAILY
COMMUNITY
End: 2021-05-07

## 2018-01-26 RX ADMIN — INSULIN HUMAN 20 UNITS: 100 INJECTION, SOLUTION PARENTERAL at 11:53

## 2018-01-26 RX ADMIN — SODIUM CHLORIDE 1000 ML: 9 INJECTION, SOLUTION INTRAVENOUS at 11:53

## 2018-01-26 NOTE — ED PROVIDER NOTES
mmol/L    Potassium 4.8 3.7 - 5.3 mmol/L    Chloride 93 (L) 98 - 107 mmol/L    CO2 23 20 - 31 mmol/L    Anion Gap 18 (H) 9 - 17 mmol/L    GFR Non-African American >60 >60 mL/min    GFR African American >60 >60 mL/min    GFR Comment          GFR Staging NOT REPORTED    CBC Auto Differential   Result Value Ref Range    WBC 17.0 (H) 3.5 - 11.0 k/uL    RBC 4.95 4.0 - 5.2 m/uL    Hemoglobin 15.4 12.0 - 16.0 g/dL    Hematocrit 46.6 (H) 36 - 46 %    MCV 94.1 80 - 100 fL    MCH 31.0 26 - 34 pg    MCHC 33.0 31 - 37 g/dL    RDW 12.8 12.5 - 15.4 %    Platelets 497 501 - 233 k/uL    MPV 7.5 6.0 - 12.0 fL    NRBC Automated NOT REPORTED per 100 WBC    Differential Type NOT REPORTED     Seg Neutrophils 82 (H) 36 - 66 %    Lymphocytes 14 (L) 24 - 44 %    Monocytes 3 2 - 11 %    Eosinophils % 0 (L) 1 - 4 %    Basophils 1 0 - 2 %    Immature Granulocytes NOT REPORTED 0 %    Segs Absolute 13.90 (H) 1.8 - 7.7 k/uL    Absolute Lymph # 2.40 1.0 - 4.8 k/uL    Absolute Mono # 0.50 0.1 - 1.2 k/uL    Absolute Eos # 0.00 0.0 - 0.4 k/uL    Basophils # 0.10 0.0 - 0.2 k/uL    Absolute Immature Granulocyte NOT REPORTED 0.00 - 0.30 k/uL    WBC Morphology NOT REPORTED     RBC Morphology NOT REPORTED     Platelet Estimate NOT REPORTED    Beta-Hydroxybutyrate   Result Value Ref Range    Beta-Hydroxybutyrate 0.09 0.02 - 0.27 mmol/L   POC Glucose Fingerstick   Result Value Ref Range    POC Glucose 551 (HH) 65 - 105 mg/dL       ABNORMAL LABS:  Labs Reviewed   BASIC METABOLIC PANEL - Abnormal; Notable for the following:        Result Value    Glucose 534 (*)     BUN 22 (*)     Sodium 134 (*)     Chloride 93 (*)     Anion Gap 18 (*)     All other components within normal limits   CBC WITH AUTO DIFFERENTIAL - Abnormal; Notable for the following:     WBC 17.0 (*)     Hematocrit 46.6 (*)     Seg Neutrophils 82 (*)     Lymphocytes 14 (*)     Eosinophils % 0 (*)     Segs Absolute 13.90 (*)     All other components within normal limits   POC GLUCOSE FINGERSTICK

## 2018-01-26 NOTE — ED NOTES
Pt presents to ed with c/o elevated bs. Pt reports she was seen at [facility] [place] wed, dx with bronchitis and given steroids. Pt states her BS this am was reading \"high. \"  She reports that she took 20 units of insulin and called her pcp. Pt was instructed to be seen at ed. Upon arrival pt is awake, alert and appropriate. SHe is able to ambulate and speak in full sentences without difficulty. She denies any cp, sob, nausea or vomiting. Pt does state that she has been urinating more frequently. She states that she has an occasional cough but that is not new. Pt positioned for comfort, call light placed within reach. Denies needs at this time, will continue to monitor.       Ladan Lei RN  01/26/18 [id number]

## 2018-01-26 NOTE — LETTER
[facility]  [address] [address]  Phone: [phone]  Fax: [phone]               January 26, 2018    Patient: Dee Castaneda   YOB: 1970   Date of Visit: 1/26/2018       To Whom It May Concern:    Dee Castaneda was seen and treated in our emergency department on 1/26/2018. She may return to work 1/27/2018.       Sincerely,       Attending Physician        Signature:__________________________________

## 2021-05-07 ENCOUNTER — APPOINTMENT (OUTPATIENT)
Dept: CT IMAGING | Age: 51
End: 2021-05-07
Payer: COMMERCIAL

## 2021-05-07 ENCOUNTER — HOSPITAL ENCOUNTER (EMERGENCY)
Age: 51
Discharge: HOME | End: 2021-05-07
Attending: EMERGENCY MEDICINE
Payer: COMMERCIAL

## 2021-05-07 VITALS
OXYGEN SATURATION: 94 % | WEIGHT: 240 LBS | HEART RATE: 94 BPM | BODY MASS INDEX: 40.97 KG/M2 | HEIGHT: 64 IN | DIASTOLIC BLOOD PRESSURE: 75 MMHG | RESPIRATION RATE: 16 BRPM | SYSTOLIC BLOOD PRESSURE: 118 MMHG | TEMPERATURE: 100.5 F

## 2021-05-07 DIAGNOSIS — L03.317 CELLULITIS OF LEFT BUTTOCK: Primary | ICD-10-CM

## 2021-05-07 LAB
ABSOLUTE EOS #: 0.5 K/UL (ref 0–0.4)
ABSOLUTE IMMATURE GRANULOCYTE: ABNORMAL K/UL (ref 0–0.3)
ABSOLUTE LYMPH #: 2.8 K/UL (ref 1–4.8)
ABSOLUTE MONO #: 1.1 K/UL (ref 0.1–1.2)
ALBUMIN SERPL-MCNC: 4.1 G/DL (ref 3.5–5.2)
ALBUMIN/GLOBULIN RATIO: 1.1 (ref 1–2.5)
ALP BLD-CCNC: 129 U/L (ref 35–104)
ALT SERPL-CCNC: 12 U/L (ref 5–33)
ANION GAP SERPL CALCULATED.3IONS-SCNC: 11 MMOL/L (ref 9–17)
AST SERPL-CCNC: 11 U/L
BASOPHILS # BLD: 1 % (ref 0–2)
BASOPHILS ABSOLUTE: 0.2 K/UL (ref 0–0.2)
BILIRUB SERPL-MCNC: 0.93 MG/DL (ref 0.3–1.2)
BILIRUBIN DIRECT: 0.21 MG/DL
BILIRUBIN, INDIRECT: 0.72 MG/DL (ref 0–1)
BUN BLDV-MCNC: 9 MG/DL (ref 6–20)
BUN/CREAT BLD: ABNORMAL (ref 9–20)
CALCIUM SERPL-MCNC: 9.5 MG/DL (ref 8.6–10.4)
CHLORIDE BLD-SCNC: 97 MMOL/L (ref 98–107)
CO2: 25 MMOL/L (ref 20–31)
CREAT SERPL-MCNC: 0.66 MG/DL (ref 0.5–0.9)
DIFFERENTIAL TYPE: ABNORMAL
EOSINOPHILS RELATIVE PERCENT: 3 % (ref 1–4)
GFR AFRICAN AMERICAN: >60 ML/MIN
GFR NON-AFRICAN AMERICAN: >60 ML/MIN
GFR SERPL CREATININE-BSD FRML MDRD: ABNORMAL ML/MIN/{1.73_M2}
GFR SERPL CREATININE-BSD FRML MDRD: ABNORMAL ML/MIN/{1.73_M2}
GLOBULIN: ABNORMAL G/DL (ref 1.5–3.8)
GLUCOSE BLD-MCNC: 340 MG/DL (ref 70–99)
HCT VFR BLD CALC: 44.5 % (ref 36–46)
HEMOGLOBIN: 15 G/DL (ref 12–16)
IMMATURE GRANULOCYTES: ABNORMAL %
INR BLD: 1.1
LACTIC ACID: 2.1 MMOL/L (ref 0.5–2.2)
LYMPHOCYTES # BLD: 15 % (ref 24–44)
MCH RBC QN AUTO: 30.4 PG (ref 26–34)
MCHC RBC AUTO-ENTMCNC: 33.6 G/DL (ref 31–37)
MCV RBC AUTO: 90.5 FL (ref 80–100)
MONOCYTES # BLD: 6 % (ref 2–11)
NRBC AUTOMATED: ABNORMAL PER 100 WBC
PARTIAL THROMBOPLASTIN TIME: 29.4 SEC (ref 21.3–31.3)
PDW BLD-RTO: 12.8 % (ref 12.5–15.4)
PLATELET # BLD: 315 K/UL (ref 140–450)
PLATELET ESTIMATE: ABNORMAL
PMV BLD AUTO: 6.8 FL (ref 6–12)
POTASSIUM SERPL-SCNC: 3.9 MMOL/L (ref 3.7–5.3)
PROTHROMBIN TIME: 10.8 SEC (ref 9.4–12.6)
RBC # BLD: 4.91 M/UL (ref 4–5.2)
RBC # BLD: ABNORMAL 10*6/UL
SEG NEUTROPHILS: 75 % (ref 36–66)
SEGMENTED NEUTROPHILS ABSOLUTE COUNT: 14.6 K/UL (ref 1.8–7.7)
SODIUM BLD-SCNC: 133 MMOL/L (ref 135–144)
TOTAL PROTEIN: 8 G/DL (ref 6.4–8.3)
WBC # BLD: 19.2 K/UL (ref 3.5–11)
WBC # BLD: ABNORMAL 10*3/UL

## 2021-05-07 PROCEDURE — 83605 ASSAY OF LACTIC ACID: CPT

## 2021-05-07 PROCEDURE — 2580000003 HC RX 258: Performed by: EMERGENCY MEDICINE

## 2021-05-07 PROCEDURE — 85730 THROMBOPLASTIN TIME PARTIAL: CPT

## 2021-05-07 PROCEDURE — 85610 PROTHROMBIN TIME: CPT

## 2021-05-07 PROCEDURE — 80048 BASIC METABOLIC PNL TOTAL CA: CPT

## 2021-05-07 PROCEDURE — 99283 EMERGENCY DEPT VISIT LOW MDM: CPT

## 2021-05-07 PROCEDURE — 72193 CT PELVIS W/DYE: CPT

## 2021-05-07 PROCEDURE — 85025 COMPLETE CBC W/AUTO DIFF WBC: CPT

## 2021-05-07 PROCEDURE — 6360000004 HC RX CONTRAST MEDICATION: Performed by: EMERGENCY MEDICINE

## 2021-05-07 PROCEDURE — 36415 COLL VENOUS BLD VENIPUNCTURE: CPT

## 2021-05-07 PROCEDURE — 80076 HEPATIC FUNCTION PANEL: CPT

## 2021-05-07 PROCEDURE — 87040 BLOOD CULTURE FOR BACTERIA: CPT

## 2021-05-07 RX ORDER — SODIUM CHLORIDE 0.9 % (FLUSH) 0.9 %
10 SYRINGE (ML) INJECTION PRN
Status: DISCONTINUED | OUTPATIENT
Start: 2021-05-07 | End: 2021-05-07 | Stop reason: HOSPADM

## 2021-05-07 RX ORDER — 0.9 % SODIUM CHLORIDE 0.9 %
80 INTRAVENOUS SOLUTION INTRAVENOUS ONCE
Status: COMPLETED | OUTPATIENT
Start: 2021-05-07 | End: 2021-05-07

## 2021-05-07 RX ORDER — LIDOCAINE HYDROCHLORIDE 10 MG/ML
5 INJECTION, SOLUTION INFILTRATION; PERINEURAL ONCE
Status: DISCONTINUED | OUTPATIENT
Start: 2021-05-07 | End: 2021-05-07 | Stop reason: HOSPADM

## 2021-05-07 RX ORDER — LAMOTRIGINE 100 MG/1
100 TABLET ORAL 2 TIMES DAILY
COMMUNITY
End: 2021-06-14 | Stop reason: ALTCHOICE

## 2021-05-07 RX ORDER — DOXYCYCLINE HYCLATE 100 MG
100 TABLET ORAL 2 TIMES DAILY
Qty: 20 TABLET | Refills: 0 | Status: SHIPPED | OUTPATIENT
Start: 2021-05-07 | End: 2021-05-17

## 2021-05-07 RX ADMIN — IOPAMIDOL 75 ML: 755 INJECTION, SOLUTION INTRAVENOUS at 13:08

## 2021-05-07 RX ADMIN — SODIUM CHLORIDE, PRESERVATIVE FREE 10 ML: 5 INJECTION INTRAVENOUS at 13:08

## 2021-05-07 RX ADMIN — SODIUM CHLORIDE 80 ML: 9 INJECTION, SOLUTION INTRAVENOUS at 13:08

## 2021-05-07 ASSESSMENT — PAIN DESCRIPTION - DESCRIPTORS: DESCRIPTORS: ACHING;SHARP

## 2021-05-07 ASSESSMENT — PAIN DESCRIPTION - ORIENTATION: ORIENTATION: LEFT

## 2021-05-07 ASSESSMENT — PAIN DESCRIPTION - PAIN TYPE: TYPE: ACUTE PAIN

## 2021-05-07 ASSESSMENT — PAIN SCALES - GENERAL: PAINLEVEL_OUTOF10: 6

## 2021-05-07 NOTE — ED NOTES
Patient cleared for discharge. Patient discharge instructions explained, Rx given and explained to patient. Patient verbalized understanding of all instructions and all patient questions answered to their satisfaction. Patient departs in stable condition.         Jarret Dwyer RN  05/07/21 1641

## 2021-05-07 NOTE — ED PROVIDER NOTES
[address] [facility]  [address] [facility]   Phone: [phone]  Fax: [phone]        Pt Name: Leni Fulton  MRN: [id number]  [place] 1970  Date of evaluation: 21    [address]       Chief Complaint   Patient presents with    Abscess       HISTORY OF PRESENT ILLNESS (Location/Symptom, Timing/Onset, Context/Setting, Quality, Duration, Modifying Factors, Severity)      Leni Fulton is a 44 y.o. female with pertinent PMH of T2DM who presents to the ED via private auto with concern for an abscess. Patient reports approximately 1 week ago she noticed a tender area on her left buttock. She thought initially the pain was from sitting on her buttocks to long. Denies any known injury otherwise. She says about 4 days ago she noticed increased pain to the area and noticed that it felt a little warm and raised. Patient has had abscesses in the past and was concerned that she was developing an abscess. She has not had any drainage from the area. She has exacerbation of the pain with sitting or touching the area. She has taken Tylenol without much improvement. Denies any fever, chills, nausea, vomiting, changes in bowel movements, history of pilonidal cysts, numbness, paresthesias, or any other concerns at this time. PAST MEDICAL / SURGICAL / SOCIAL / FAMILY HISTORY     PMH:  has a past medical history of ADD (attention deficit disorder), Arrhythmia, Asthma, Back pain, Bronchitis, Caffeine use, Cholelithiasis, COPD (chronic obstructive pulmonary disease) ([address] Depression, Diabetes mellitus ([address] Diabetes mellitus type 2, noninsulin dependent ([address] Diarrhea, GERD (gastroesophageal reflux disease), Hyperlipidemia, Kidney infection, Kidney stone, Kidney stones, Pneumonia, and Sleep apnea. Surgical History:  has a past surgical history that includes  section ( & ); Cholecystectomy (); Hysterectomy; Colonoscopy ();  Cystoscopy; as marked. PHYSICAL EXAM  (up to 7 for level 4, 8 or more for level 5)      INITIAL VITALS:  height is 5' 4\" (1.626 m) and weight is 108.9 kg (240 lb). Her oral temperature is 100.5 °F (38.1 °C). Her blood pressure is 118/75 and her pulse is 94. Her respiration is 16 and oxygen saturation is 94%. Vital signs reviewed. Physical Exam    General:  Alert, cooperative, well-groomed, well-nourished, appears stated age, and is in no acute distress. Head:  Normocephalic, atraumatic, and without obvious abnormality. Eyes:  Sclerae/conjunctivae clear without injection, pallor, or icterus. Corneas clear without opacities. EOM's intact. ENT: Ears and nose are all without obvious masses lesion or deformity. No oropharynx examination performed due to aerosolization risk during COVID-19 pandemic. Neck: Supple and symmetrical. Trachea midline. No adenopathy. No jugular venous distention. Lungs:   No respiratory distress. Clear to auscultation bilaterally. No wheezes, rhonchi, or rales. Heart:  Regular rate. Regular rhythm. No murmurs, rubs, or gallops. Abdomen:   Normoactive bowel sounds. Soft, nontender, nondistended without guarding or rebound. No palpable masses. No CVA tenderness. Extremities: Warm and dry without erythema or edema. Skin: Soft, good turgor, and well-hydrated. SEE MEDIA FOR L GLUTEAL ABSCESS. Neurologic: GCS is 15 and no focal deficits are appreciated. Normal gait. Grossly normal motor and sensation. Speech clear. Psychiatric: Normal mood and affect. Normal behavior. Coherent thought process. DIFFERENTIAL DIAGNOSIS / MDM     Patient is a 44 y.o. female who presents to the ED today with the complaint above concerning for cellulitis vs abscess. Initial vital signs demonstrate tachycardia at 110 but otherwise stable. Initial temp noted to be 99 however repeat temp was 100.9.   Due to the area and proximity of the rectum, though the cellulitis does not extend to the rectum, will obtain a CT scan of the area as well as lab work. PLAN (LABS / IMAGING / EKG):  Orders Placed This Encounter   Procedures    Culture, Blood 1    Culture, Blood 2    CT PELVIS W CONTRAST Additional Contrast? None    CBC Auto Differential    Basic Metabolic Panel    APTT    Protime-INR    Lactic Acid    Hepatic Function Panel       MEDICATIONS ORDERED:  Orders Placed This Encounter   Medications    DISCONTD: lidocaine 1 % injection 5 mL    DISCONTD: sodium chloride flush 0.9 % injection 10 mL    0.9 % sodium chloride bolus    iopamidol (ISOVUE-370) 76 % injection 75 mL    doxycycline hyclate (VIBRA-TABS) 100 MG tablet     Sig: Take 1 tablet by mouth 2 times daily for 10 days     Dispense:  20 tablet     Refill:  0       Controlled Substances Monitoring:     DIAGNOSTIC RESULTS     RADIOLOGY: All images are read by the radiologist and their interpretations are reviewed. Ct Pelvis W Contrast Additional Contrast? None    Result Date: 5/7/2021  EXAMINATION: CT OF THE PELVIS WITH CONTRAST 5/7/2021 11:59 am TECHNIQUE: CT of the pelvis was performed with the administration of intravenous contrast. Multiplanar reformatted images are provided for review. Dose modulation, iterative reconstruction, and/or weight based adjustment of the mA/kV was utilized to reduce the radiation dose to as low as reasonably achievable. COMPARISON: None. HISTORY: ORDERING SYSTEM PROVIDED HISTORY: abscess left buttocks abscess with significant cellulitis TECHNOLOGIST PROVIDED HISTORY: abscess left buttocks abscess with significant cellulitis Decision Support Exception - unselect if not a suspected or confirmed emergency medical condition->Emergency Medical Condition (MA) Reason for Exam: abcess left buttocks with cellulitis Acuity: Unknown Type of Exam: Unknown FINDINGS: Inflammatory changes in the perianal region i and the soft tissues of the left buttock medially. No evidence for abscess.   No evidence for gas in the soft tissues. The findings are consistent with cellulitis. No Perirectal abnormality. No inflammatory changes within the pelvis     Perianal and left buttock cellulitis without evidence of abscess collection       LABS:  Results for orders placed or performed during the hospital encounter of 05/07/21   Culture, Blood 1    Specimen: Blood   Result Value Ref Range    Specimen Description . BLOOD     Special Requests 20ML LAC     Culture NO GROWTH 3 DAYS    Culture, Blood 2    Specimen: Blood   Result Value Ref Range    Specimen Description . BLOOD     Special Requests 20ML LEFT ARM     Culture NO GROWTH 3 DAYS    CBC Auto Differential   Result Value Ref Range    WBC 19.2 (H) 3.5 - 11.0 k/uL    RBC 4.91 4.0 - 5.2 m/uL    Hemoglobin 15.0 12.0 - 16.0 g/dL    Hematocrit 44.5 36 - 46 %    MCV 90.5 80 - 100 fL    MCH 30.4 26 - 34 pg    MCHC 33.6 31 - 37 g/dL    RDW 12.8 12.5 - 15.4 %    Platelets 713 728 - 152 k/uL    MPV 6.8 6.0 - 12.0 fL    NRBC Automated NOT REPORTED per 100 WBC    Differential Type NOT REPORTED     Seg Neutrophils 75 (H) 36 - 66 %    Lymphocytes 15 (L) 24 - 44 %    Monocytes 6 2 - 11 %    Eosinophils % 3 1 - 4 %    Basophils 1 0 - 2 %    Immature Granulocytes NOT REPORTED 0 %    Segs Absolute 14.60 (H) 1.8 - 7.7 k/uL    Absolute Lymph # 2.80 1.0 - 4.8 k/uL    Absolute Mono # 1.10 0.1 - 1.2 k/uL    Absolute Eos # 0.50 (H) 0.0 - 0.4 k/uL    Basophils Absolute 0.20 0.0 - 0.2 k/uL    Absolute Immature Granulocyte NOT REPORTED 0.00 - 0.30 k/uL    WBC Morphology NOT REPORTED     RBC Morphology NOT REPORTED     Platelet Estimate NOT REPORTED    Basic Metabolic Panel   Result Value Ref Range    Glucose 340 (H) 70 - 99 mg/dL    BUN 9 6 - 20 mg/dL    CREATININE 0.66 0.50 - 0.90 mg/dL    Bun/Cre Ratio NOT REPORTED 9 - 20    Calcium 9.5 8.6 - 10.4 mg/dL    Sodium 133 (L) 135 - 144 mmol/L    Potassium 3.9 3.7 - 5.3 mmol/L    Chloride 97 (L) 98 - 107 mmol/L    CO2 25 20 - 31 mmol/L    Anion Gap 11 9 - 17 mmol/L    GFR Non-African American >60 >60 mL/min    GFR African American >60 >60 mL/min    GFR Comment          GFR Staging NOT REPORTED    APTT   Result Value Ref Range    PTT 29.4 21.3 - 31.3 sec   Protime-INR   Result Value Ref Range    Protime 10.8 9.4 - 12.6 sec    INR 1.1    Lactic Acid   Result Value Ref Range    Lactic Acid 2.1 0.5 - 2.2 mmol/L   Hepatic Function Panel   Result Value Ref Range    Albumin 4.1 3.5 - 5.2 g/dL    Alkaline Phosphatase 129 (H) 35 - 104 U/L    ALT 12 5 - 33 U/L    AST 11 <32 U/L    Total Bilirubin 0.93 0.3 - 1.2 mg/dL    Bilirubin, Direct 0.21 <0.31 mg/dL    Bilirubin, Indirect 0.72 0.00 - 1.00 mg/dL    Total Protein 8.0 6.4 - 8.3 g/dL    Globulin NOT REPORTED 1.5 - 3.8 g/dL    Albumin/Globulin Ratio 1.1 1.0 - 2.5       EMERGENCY DEPARTMENT COURSE           Vitals:    Vitals:    05/07/21 1110 05/07/21 1406   BP: 135/78 118/75   Pulse: 110 94   Resp: 16 16   Temp: 100.9 °F (38.3 °C) 100.5 °F (38.1 °C)   TempSrc: Tympanic Oral   SpO2: 94% 94%   Weight: 108.9 kg (240 lb)    Height: 5' 4\" (1.626 m)      -------------------------  BP: 118/75, Temp: 100.5 °F (38.1 °C), Pulse: 94, Resp: 16      RE-EVALUATION:  Patient was updated regarding the results of the lab work and CT scan which demonstrated an elevated WBC at 19.2, glucose 340 (consistent with her DM), and left gluteal cellulitis without abscess or fluid collection. With this result, will not perform an incision and drainage and will treat the patient with doxycycline as patient states that she does not want to be admitted for IV antibiotics though this is our recommendation. She understands that she needs a wound check in 48-72 hours for reevaluation and may need to return to the ED for admission for IV antibiotics. Repeat vitals improved which is reassuring. Patient advised antipyretics and warm compresses as well doxycycline as prescribed.     The patient and/or family and I have discussed the diagnosis and risks, and we agree with taking these medications    Details   doxycycline hyclate (VIBRA-TABS) 100 MG tablet Take 1 tablet by mouth 2 times daily for 10 days, Disp-20 tablet, R-0Normal             Randal Arteaga PA-C   Emergency Medicine Physician Assistant    (Please note that portions of this note were completed with a voice recognition program.  Efforts were made to edit the dictations but occasionally words aremis-transcribed.)       Randal Arteaga PA-C  05/10/21 5601

## 2021-05-07 NOTE — ED PROVIDER NOTES
[address] [facility]  [address] [facility] [address]  Phone: [phone]  Fax: [phone]      Attending Physician [address]       Chief Complaint   Patient presents with    Abscess       DIAGNOSTIC RESULTS     LABS:  Labs Reviewed - No data to display    All other labs were within normal range or not returned as of this dictation. RADIOLOGY:  No orders to display         EMERGENCY DEPARTMENT COURSE:   Vitals:    Vitals:    05/07/21 1110   BP: 135/78   Pulse: 110   Resp: 16   SpO2: 94%   Weight: 108.9 kg (240 lb)   Height: 5' 4\" (1.626 m)     -------------------------  BP: 135/78,  , Pulse: 110, Resp: 16             PERTINENT ATTENDING PHYSICIAN COMMENTS:    I performed a history and physical examination of the patient and discussed management with the mid level provider. I reviewed the mid level provider's note and agree with the documented findings and plan of care. Any areas of disagreement are noted on the chart. I was personally present for the key portions of any procedures. I have documented in the chart those procedures where I was not present during the key portions. I have reviewed the emergency nurses triage note. I agree with the chief complaint, past medical history, past surgical history, allergies, medications, social and family history as documented unless otherwise noted below. Documentation of the HPI, Physical Exam and Medical Decision Making performed by mid level providers is based on my personal performance of the HPI, PE and MDM. For Physician Assistant/ Nurse Practitioner cases/documentation I have personally evaluated this patient and have completed at least one if not all key elements of the E/M (history, physical exam, and MDM). Additional findings are as noted. Left buttocks abscess into the gluteal crease. No perianal involvement. Plan for I&D.        (Please note that portions of this note were completed with a voice recognition program.  Efforts were made to edit the dictations but occasionally words are mis-transcribed.)    Revonda Osler, MD  Attending Emergency Medicine Physician       Revonda Osler, MD  05/07/21 [id number]

## 2021-05-13 LAB
CULTURE: NORMAL
CULTURE: NORMAL
Lab: NORMAL
Lab: NORMAL
SPECIMEN DESCRIPTION: NORMAL
SPECIMEN DESCRIPTION: NORMAL

## 2021-06-14 ENCOUNTER — HOSPITAL ENCOUNTER (INPATIENT)
Age: 51
LOS: 3 days | Discharge: HOME | DRG: 189 | End: 2021-06-17
Attending: EMERGENCY MEDICINE | Admitting: FAMILY MEDICINE
Payer: COMMERCIAL

## 2021-06-14 ENCOUNTER — APPOINTMENT (OUTPATIENT)
Dept: GENERAL RADIOLOGY | Age: 51
DRG: 189 | End: 2021-06-14
Payer: COMMERCIAL

## 2021-06-14 DIAGNOSIS — E11.65 TYPE 2 DIABETES MELLITUS WITH HYPERGLYCEMIA, WITH LONG-TERM CURRENT USE OF INSULIN (HCC): ICD-10-CM

## 2021-06-14 DIAGNOSIS — J96.01 ACUTE HYPOXEMIC RESPIRATORY FAILURE (HCC): ICD-10-CM

## 2021-06-14 DIAGNOSIS — J44.0 COPD WITH ACUTE BRONCHITIS (HCC): Primary | ICD-10-CM

## 2021-06-14 DIAGNOSIS — G47.33 OSA (OBSTRUCTIVE SLEEP APNEA): ICD-10-CM

## 2021-06-14 DIAGNOSIS — E66.9 OBESITY (BMI 30-39.9): ICD-10-CM

## 2021-06-14 DIAGNOSIS — R09.02 HYPOXIA: ICD-10-CM

## 2021-06-14 DIAGNOSIS — Z79.4 TYPE 2 DIABETES MELLITUS WITH HYPERGLYCEMIA, WITH LONG-TERM CURRENT USE OF INSULIN (HCC): ICD-10-CM

## 2021-06-14 DIAGNOSIS — E16.2 HYPOGLYCEMIA: ICD-10-CM

## 2021-06-14 DIAGNOSIS — J44.1 ACUTE EXACERBATION OF CHRONIC OBSTRUCTIVE PULMONARY DISEASE (HCC): ICD-10-CM

## 2021-06-14 DIAGNOSIS — J20.9 COPD WITH ACUTE BRONCHITIS (HCC): Primary | ICD-10-CM

## 2021-06-14 PROBLEM — J40 BRONCHITIS: Status: ACTIVE | Noted: 2021-06-14

## 2021-06-14 LAB
-: ABNORMAL
ABSOLUTE EOS #: 0.1 K/UL (ref 0–0.4)
ABSOLUTE IMMATURE GRANULOCYTE: ABNORMAL K/UL (ref 0–0.3)
ABSOLUTE LYMPH #: 2.2 K/UL (ref 1–4.8)
ABSOLUTE MONO #: 0.8 K/UL (ref 0.1–1.2)
ALBUMIN SERPL-MCNC: 4.3 G/DL (ref 3.5–5.2)
ALBUMIN/GLOBULIN RATIO: 1.1 (ref 1–2.5)
ALP BLD-CCNC: 133 U/L (ref 35–104)
ALT SERPL-CCNC: 9 U/L (ref 5–33)
AMORPHOUS: ABNORMAL
ANION GAP SERPL CALCULATED.3IONS-SCNC: 14 MMOL/L (ref 9–17)
AST SERPL-CCNC: 10 U/L
BACTERIA: ABNORMAL
BASOPHILS # BLD: 1 % (ref 0–2)
BASOPHILS ABSOLUTE: 0.1 K/UL (ref 0–0.2)
BILIRUB SERPL-MCNC: 0.94 MG/DL (ref 0.3–1.2)
BILIRUBIN URINE: NEGATIVE
BNP INTERPRETATION: ABNORMAL
BUN BLDV-MCNC: 9 MG/DL (ref 6–20)
BUN/CREAT BLD: ABNORMAL (ref 9–20)
CALCIUM SERPL-MCNC: 9.6 MG/DL (ref 8.6–10.4)
CASTS UA: ABNORMAL /LPF
CHLORIDE BLD-SCNC: 99 MMOL/L (ref 98–107)
CO2: 24 MMOL/L (ref 20–31)
COLOR: YELLOW
COMMENT UA: ABNORMAL
CREAT SERPL-MCNC: 0.49 MG/DL (ref 0.5–0.9)
CRYSTALS, UA: ABNORMAL /HPF
D-DIMER QUANTITATIVE: 0.46 MG/L FEU
DIFFERENTIAL TYPE: ABNORMAL
EOSINOPHILS RELATIVE PERCENT: 1 % (ref 1–4)
EPITHELIAL CELLS UA: ABNORMAL /HPF (ref 0–5)
GFR AFRICAN AMERICAN: >60 ML/MIN
GFR NON-AFRICAN AMERICAN: >60 ML/MIN
GFR SERPL CREATININE-BSD FRML MDRD: ABNORMAL ML/MIN/{1.73_M2}
GFR SERPL CREATININE-BSD FRML MDRD: ABNORMAL ML/MIN/{1.73_M2}
GLUCOSE BLD-MCNC: 245 MG/DL (ref 70–99)
GLUCOSE BLD-MCNC: 309 MG/DL (ref 65–105)
GLUCOSE URINE: ABNORMAL
HCT VFR BLD CALC: 45.4 % (ref 36–46)
HEMOGLOBIN: 15 G/DL (ref 12–16)
IMMATURE GRANULOCYTES: ABNORMAL %
KETONES, URINE: ABNORMAL
LACTIC ACID, SEPSIS WHOLE BLOOD: NORMAL MMOL/L (ref 0.5–1.9)
LACTIC ACID, SEPSIS: 1.6 MMOL/L (ref 0.5–1.9)
LEUKOCYTE ESTERASE, URINE: NEGATIVE
LYMPHOCYTES # BLD: 14 % (ref 24–44)
MAGNESIUM: 1.8 MG/DL (ref 1.6–2.6)
MCH RBC QN AUTO: 29.9 PG (ref 26–34)
MCHC RBC AUTO-ENTMCNC: 33.1 G/DL (ref 31–37)
MCV RBC AUTO: 90.1 FL (ref 80–100)
MONOCYTES # BLD: 5 % (ref 2–11)
MUCUS: ABNORMAL
NITRITE, URINE: POSITIVE
NRBC AUTOMATED: ABNORMAL PER 100 WBC
OTHER OBSERVATIONS UA: ABNORMAL
PDW BLD-RTO: 12.9 % (ref 12.5–15.4)
PH UA: 6 (ref 5–8)
PLATELET # BLD: 326 K/UL (ref 140–450)
PLATELET ESTIMATE: ABNORMAL
PMV BLD AUTO: 6.6 FL (ref 6–12)
POTASSIUM SERPL-SCNC: 3.8 MMOL/L (ref 3.7–5.3)
PRO-BNP: 390 PG/ML
PROTEIN UA: ABNORMAL
RBC # BLD: 5.03 M/UL (ref 4–5.2)
RBC # BLD: ABNORMAL 10*6/UL
RBC UA: ABNORMAL /HPF (ref 0–2)
RENAL EPITHELIAL, UA: ABNORMAL /HPF
SEG NEUTROPHILS: 79 % (ref 36–66)
SEGMENTED NEUTROPHILS ABSOLUTE COUNT: 12.6 K/UL (ref 1.8–7.7)
SODIUM BLD-SCNC: 137 MMOL/L (ref 135–144)
SPECIFIC GRAVITY UA: 1.02 (ref 1–1.03)
TOTAL PROTEIN: 8.2 G/DL (ref 6.4–8.3)
TRICHOMONAS: ABNORMAL
TROPONIN INTERP: NORMAL
TROPONIN T: NORMAL NG/ML
TROPONIN, HIGH SENSITIVITY: 10 NG/L (ref 0–14)
TURBIDITY: CLEAR
URINE HGB: ABNORMAL
UROBILINOGEN, URINE: NORMAL
WBC # BLD: 15.9 K/UL (ref 3.5–11)
WBC # BLD: ABNORMAL 10*3/UL
WBC UA: ABNORMAL /HPF (ref 0–5)
YEAST: ABNORMAL

## 2021-06-14 PROCEDURE — 82947 ASSAY GLUCOSE BLOOD QUANT: CPT

## 2021-06-14 PROCEDURE — 36415 COLL VENOUS BLD VENIPUNCTURE: CPT

## 2021-06-14 PROCEDURE — 94640 AIRWAY INHALATION TREATMENT: CPT

## 2021-06-14 PROCEDURE — 85379 FIBRIN DEGRADATION QUANT: CPT

## 2021-06-14 PROCEDURE — 6370000000 HC RX 637 (ALT 250 FOR IP)

## 2021-06-14 PROCEDURE — 2580000003 HC RX 258: Performed by: EMERGENCY MEDICINE

## 2021-06-14 PROCEDURE — 93005 ELECTROCARDIOGRAM TRACING: CPT | Performed by: EMERGENCY MEDICINE

## 2021-06-14 PROCEDURE — 81001 URINALYSIS AUTO W/SCOPE: CPT

## 2021-06-14 PROCEDURE — 6360000002 HC RX W HCPCS: Performed by: EMERGENCY MEDICINE

## 2021-06-14 PROCEDURE — 83880 ASSAY OF NATRIURETIC PEPTIDE: CPT

## 2021-06-14 PROCEDURE — 99285 EMERGENCY DEPT VISIT HI MDM: CPT

## 2021-06-14 PROCEDURE — 96375 TX/PRO/DX INJ NEW DRUG ADDON: CPT

## 2021-06-14 PROCEDURE — 83605 ASSAY OF LACTIC ACID: CPT

## 2021-06-14 PROCEDURE — 6370000000 HC RX 637 (ALT 250 FOR IP): Performed by: NURSE PRACTITIONER

## 2021-06-14 PROCEDURE — 80053 COMPREHEN METABOLIC PANEL: CPT

## 2021-06-14 PROCEDURE — 2500000003 HC RX 250 WO HCPCS: Performed by: EMERGENCY MEDICINE

## 2021-06-14 PROCEDURE — 2700000000 HC OXYGEN THERAPY PER DAY

## 2021-06-14 PROCEDURE — 83036 HEMOGLOBIN GLYCOSYLATED A1C: CPT

## 2021-06-14 PROCEDURE — 85025 COMPLETE CBC W/AUTO DIFF WBC: CPT

## 2021-06-14 PROCEDURE — 71045 X-RAY EXAM CHEST 1 VIEW: CPT

## 2021-06-14 PROCEDURE — 6370000000 HC RX 637 (ALT 250 FOR IP): Performed by: EMERGENCY MEDICINE

## 2021-06-14 PROCEDURE — 87086 URINE CULTURE/COLONY COUNT: CPT

## 2021-06-14 PROCEDURE — 96374 THER/PROPH/DIAG INJ IV PUSH: CPT

## 2021-06-14 PROCEDURE — 86738 MYCOPLASMA ANTIBODY: CPT

## 2021-06-14 PROCEDURE — 84484 ASSAY OF TROPONIN QUANT: CPT

## 2021-06-14 PROCEDURE — 2060000000 HC ICU INTERMEDIATE R&B

## 2021-06-14 PROCEDURE — 83735 ASSAY OF MAGNESIUM: CPT

## 2021-06-14 RX ORDER — IPRATROPIUM BROMIDE AND ALBUTEROL SULFATE 2.5; .5 MG/3ML; MG/3ML
SOLUTION RESPIRATORY (INHALATION)
Status: COMPLETED
Start: 2021-06-14 | End: 2021-06-14

## 2021-06-14 RX ORDER — NICOTINE POLACRILEX 4 MG
15 LOZENGE BUCCAL PRN
Status: DISCONTINUED | OUTPATIENT
Start: 2021-06-14 | End: 2021-06-17 | Stop reason: HOSPADM

## 2021-06-14 RX ORDER — IPRATROPIUM BROMIDE AND ALBUTEROL SULFATE 2.5; .5 MG/3ML; MG/3ML
1 SOLUTION RESPIRATORY (INHALATION)
Status: DISCONTINUED | OUTPATIENT
Start: 2021-06-15 | End: 2021-06-17 | Stop reason: HOSPADM

## 2021-06-14 RX ORDER — ONDANSETRON 4 MG/1
4 TABLET, ORALLY DISINTEGRATING ORAL EVERY 8 HOURS PRN
Status: DISCONTINUED | OUTPATIENT
Start: 2021-06-14 | End: 2021-06-17 | Stop reason: HOSPADM

## 2021-06-14 RX ORDER — ACETAMINOPHEN 650 MG/1
650 SUPPOSITORY RECTAL EVERY 6 HOURS PRN
Status: DISCONTINUED | OUTPATIENT
Start: 2021-06-14 | End: 2021-06-17 | Stop reason: HOSPADM

## 2021-06-14 RX ORDER — PREDNISONE 20 MG/1
40 TABLET ORAL DAILY
Status: DISCONTINUED | OUTPATIENT
Start: 2021-06-15 | End: 2021-06-15

## 2021-06-14 RX ORDER — ALPRAZOLAM 0.25 MG/1
0.25 TABLET ORAL EVERY 12 HOURS PRN
COMMUNITY
Start: 2021-04-06

## 2021-06-14 RX ORDER — SODIUM CHLORIDE 0.9 % (FLUSH) 0.9 %
5-40 SYRINGE (ML) INJECTION EVERY 12 HOURS SCHEDULED
Status: DISCONTINUED | OUTPATIENT
Start: 2021-06-14 | End: 2021-06-17 | Stop reason: HOSPADM

## 2021-06-14 RX ORDER — ALBUTEROL SULFATE 2.5 MG/3ML
2.5 SOLUTION RESPIRATORY (INHALATION)
Status: DISCONTINUED | OUTPATIENT
Start: 2021-06-14 | End: 2021-06-17 | Stop reason: HOSPADM

## 2021-06-14 RX ORDER — INSULIN GLARGINE 100 [IU]/ML
30 INJECTION, SOLUTION SUBCUTANEOUS EVERY EVENING
Status: DISCONTINUED | OUTPATIENT
Start: 2021-06-14 | End: 2021-06-17 | Stop reason: HOSPADM

## 2021-06-14 RX ORDER — DOXYCYCLINE HYCLATE 100 MG
100 TABLET ORAL EVERY 12 HOURS
Status: DISCONTINUED | OUTPATIENT
Start: 2021-06-14 | End: 2021-06-17

## 2021-06-14 RX ORDER — SODIUM CHLORIDE 9 MG/ML
INJECTION, SOLUTION INTRAVENOUS CONTINUOUS
Status: DISCONTINUED | OUTPATIENT
Start: 2021-06-14 | End: 2021-06-16

## 2021-06-14 RX ORDER — NICOTINE 21 MG/24HR
1 PATCH, TRANSDERMAL 24 HOURS TRANSDERMAL DAILY
Status: DISCONTINUED | OUTPATIENT
Start: 2021-06-14 | End: 2021-06-17 | Stop reason: HOSPADM

## 2021-06-14 RX ORDER — TRAZODONE HYDROCHLORIDE 50 MG/1
100 TABLET ORAL NIGHTLY PRN
Status: DISCONTINUED | OUTPATIENT
Start: 2021-06-14 | End: 2021-06-15

## 2021-06-14 RX ORDER — DEXTROSE MONOHYDRATE 25 G/50ML
12.5 INJECTION, SOLUTION INTRAVENOUS PRN
Status: DISCONTINUED | OUTPATIENT
Start: 2021-06-14 | End: 2021-06-17 | Stop reason: HOSPADM

## 2021-06-14 RX ORDER — NICOTINE 21 MG/24HR
1 PATCH, TRANSDERMAL 24 HOURS TRANSDERMAL DAILY
Status: DISCONTINUED | OUTPATIENT
Start: 2021-06-15 | End: 2021-06-14

## 2021-06-14 RX ORDER — ATORVASTATIN CALCIUM 10 MG/1
10 TABLET, FILM COATED ORAL DAILY
Status: DISCONTINUED | OUTPATIENT
Start: 2021-06-15 | End: 2021-06-16

## 2021-06-14 RX ORDER — ACETAMINOPHEN 325 MG/1
650 TABLET ORAL EVERY 6 HOURS PRN
Status: DISCONTINUED | OUTPATIENT
Start: 2021-06-14 | End: 2021-06-17 | Stop reason: HOSPADM

## 2021-06-14 RX ORDER — SODIUM CHLORIDE 0.9 % (FLUSH) 0.9 %
5-40 SYRINGE (ML) INJECTION PRN
Status: DISCONTINUED | OUTPATIENT
Start: 2021-06-14 | End: 2021-06-17 | Stop reason: HOSPADM

## 2021-06-14 RX ORDER — 0.9 % SODIUM CHLORIDE 0.9 %
1000 INTRAVENOUS SOLUTION INTRAVENOUS ONCE
Status: COMPLETED | OUTPATIENT
Start: 2021-06-14 | End: 2021-06-14

## 2021-06-14 RX ORDER — ALPRAZOLAM 0.25 MG/1
0.25 TABLET ORAL 2 TIMES DAILY PRN
Status: DISCONTINUED | OUTPATIENT
Start: 2021-06-14 | End: 2021-06-17 | Stop reason: HOSPADM

## 2021-06-14 RX ORDER — DEXTROSE MONOHYDRATE 50 MG/ML
100 INJECTION, SOLUTION INTRAVENOUS PRN
Status: DISCONTINUED | OUTPATIENT
Start: 2021-06-14 | End: 2021-06-17 | Stop reason: HOSPADM

## 2021-06-14 RX ORDER — ONDANSETRON 2 MG/ML
4 INJECTION INTRAMUSCULAR; INTRAVENOUS EVERY 6 HOURS PRN
Status: DISCONTINUED | OUTPATIENT
Start: 2021-06-14 | End: 2021-06-17 | Stop reason: HOSPADM

## 2021-06-14 RX ORDER — CLONAZEPAM 1 MG/1
1 TABLET ORAL 2 TIMES DAILY PRN
Status: DISCONTINUED | OUTPATIENT
Start: 2021-06-14 | End: 2021-06-17 | Stop reason: HOSPADM

## 2021-06-14 RX ORDER — CLONAZEPAM 1 MG/1
.5-1 TABLET ORAL EVERY 12 HOURS PRN
COMMUNITY
Start: 2021-05-26

## 2021-06-14 RX ORDER — IPRATROPIUM BROMIDE AND ALBUTEROL SULFATE 2.5; .5 MG/3ML; MG/3ML
1 SOLUTION RESPIRATORY (INHALATION) ONCE
Status: COMPLETED | OUTPATIENT
Start: 2021-06-14 | End: 2021-06-14

## 2021-06-14 RX ORDER — MAGNESIUM SULFATE 1 G/100ML
1000 INJECTION INTRAVENOUS ONCE
Status: COMPLETED | OUTPATIENT
Start: 2021-06-14 | End: 2021-06-14

## 2021-06-14 RX ORDER — AMLODIPINE BESYLATE 5 MG/1
5 TABLET ORAL NIGHTLY
Status: DISCONTINUED | OUTPATIENT
Start: 2021-06-14 | End: 2021-06-16

## 2021-06-14 RX ORDER — METHYLPREDNISOLONE SODIUM SUCCINATE 125 MG/2ML
125 INJECTION, POWDER, LYOPHILIZED, FOR SOLUTION INTRAMUSCULAR; INTRAVENOUS ONCE
Status: COMPLETED | OUTPATIENT
Start: 2021-06-14 | End: 2021-06-14

## 2021-06-14 RX ORDER — POLYETHYLENE GLYCOL 3350 17 G/17G
17 POWDER, FOR SOLUTION ORAL DAILY PRN
Status: DISCONTINUED | OUTPATIENT
Start: 2021-06-14 | End: 2021-06-17 | Stop reason: HOSPADM

## 2021-06-14 RX ORDER — SODIUM CHLORIDE 9 MG/ML
25 INJECTION, SOLUTION INTRAVENOUS PRN
Status: DISCONTINUED | OUTPATIENT
Start: 2021-06-14 | End: 2021-06-17 | Stop reason: HOSPADM

## 2021-06-14 RX ADMIN — AMLODIPINE BESYLATE 5 MG: 5 TABLET ORAL at 22:51

## 2021-06-14 RX ADMIN — INSULIN GLARGINE 30 UNITS: 100 INJECTION, SOLUTION SUBCUTANEOUS at 22:52

## 2021-06-14 RX ADMIN — MAGNESIUM SULFATE HEPTAHYDRATE 1000 MG: 1 INJECTION, SOLUTION INTRAVENOUS at 17:21

## 2021-06-14 RX ADMIN — SODIUM CHLORIDE 1000 ML: 9 INJECTION, SOLUTION INTRAVENOUS at 17:20

## 2021-06-14 RX ADMIN — DOXYCYCLINE 100 MG: 100 INJECTION, POWDER, LYOPHILIZED, FOR SOLUTION INTRAVENOUS at 19:10

## 2021-06-14 RX ADMIN — CEFTRIAXONE SODIUM 1000 MG: 1 INJECTION, POWDER, FOR SOLUTION INTRAMUSCULAR; INTRAVENOUS at 18:24

## 2021-06-14 RX ADMIN — IPRATROPIUM BROMIDE AND ALBUTEROL SULFATE 1 AMPULE: .5; 3 SOLUTION RESPIRATORY (INHALATION) at 17:18

## 2021-06-14 RX ADMIN — METHYLPREDNISOLONE SODIUM SUCCINATE 125 MG: 125 INJECTION, POWDER, FOR SOLUTION INTRAMUSCULAR; INTRAVENOUS at 17:21

## 2021-06-14 RX ADMIN — IPRATROPIUM BROMIDE AND ALBUTEROL SULFATE 1 AMPULE: .5; 3 SOLUTION RESPIRATORY (INHALATION) at 17:35

## 2021-06-14 RX ADMIN — IPRATROPIUM BROMIDE AND ALBUTEROL SULFATE 1 AMPULE: .5; 3 SOLUTION RESPIRATORY (INHALATION) at 22:06

## 2021-06-14 RX ADMIN — IPRATROPIUM BROMIDE AND ALBUTEROL SULFATE 1 AMPULE: .5; 2.5 SOLUTION RESPIRATORY (INHALATION) at 22:06

## 2021-06-14 RX ADMIN — INSULIN LISPRO 4 UNITS: 100 INJECTION, SOLUTION INTRAVENOUS; SUBCUTANEOUS at 22:52

## 2021-06-14 RX ADMIN — TRAZODONE HYDROCHLORIDE 100 MG: 50 TABLET ORAL at 22:51

## 2021-06-14 ASSESSMENT — PAIN DESCRIPTION - PAIN TYPE: TYPE: ACUTE PAIN

## 2021-06-14 ASSESSMENT — PAIN DESCRIPTION - FREQUENCY: FREQUENCY: INTERMITTENT

## 2021-06-14 ASSESSMENT — PAIN DESCRIPTION - PROGRESSION: CLINICAL_PROGRESSION: NOT CHANGED

## 2021-06-14 ASSESSMENT — ENCOUNTER SYMPTOMS
COUGH: 1
SORE THROAT: 0
VOMITING: 0
DIARRHEA: 0
SHORTNESS OF BREATH: 1
WHEEZING: 1

## 2021-06-14 ASSESSMENT — PAIN SCALES - GENERAL: PAINLEVEL_OUTOF10: 2

## 2021-06-14 ASSESSMENT — PAIN DESCRIPTION - DESCRIPTORS: DESCRIPTORS: ACHING

## 2021-06-14 ASSESSMENT — PAIN DESCRIPTION - LOCATION: LOCATION: CHEST

## 2021-06-14 ASSESSMENT — PAIN DESCRIPTION - ORIENTATION: ORIENTATION: ANTERIOR;LEFT;RIGHT

## 2021-06-14 ASSESSMENT — PAIN DESCRIPTION - ONSET: ONSET: PROGRESSIVE

## 2021-06-14 NOTE — ED PROVIDER NOTES
[id number] [facility]  [address] [facility] [place] [address]  Phone: [phone]  Fax: [phone] [facility]  [place]      Pt Name: Deric Hughes  MRN: [id number]  [place] 1970  Date of evaluation: 6/14/2021  Provider: Carlitos Johnson DO    CHIEF COMPLAINT       Chief Complaint   Patient presents with    Shortness of Breath     2 DAYS OF SHORTNESS OF BREATH AND OUT OF INHALER. PT REPORTS USING DAUGHTERS NEBULIZER MACHINE AND MEDS. HX OF COUGH         HISTORY OF PRESENT ILLNESS   (Location/Symptom, Timing/Onset,Context/Setting, Quality, Duration, Modifying Factors, Severity)  Note limiting factors. Deric Hughes is a 44 y.o. female who presents to the emergency department For the evaluation of shortness of breath. Patient states that this started about 3 days ago. She feels like she has an upper respiratory infection because she has sinus congestion, cough and shortness of breath. Patient states she was vaccinated against Covid back in March. Patient has no sick contacts. She does smoke cigarettes. She has history of asthma/COPD. No recent hospitalizations or antibiotics or steroids. Patient has been using inhaler/nebulizer at home without any improvement. No fever, she does have cough productive of white/green sputum. Nursing Notes were reviewed. REVIEW OF SYSTEMS    (2-9systems for level 4, 10 or more for level 5)     Review of Systems   Constitutional: Negative for fever. HENT: Positive for congestion. Negative for sore throat. Respiratory: Positive for cough, shortness of breath and wheezing. Cardiovascular: Negative for chest pain. Gastrointestinal: Negative for diarrhea and vomiting. Genitourinary: Negative for dysuria. Skin: Negative for rash. Neurological: Negative for weakness. All other systems reviewed and are negative.       Except asnoted above the remainder of the review of systems Years: 20.00     Pack years: 20.00    Smokeless tobacco: Never Used   Substance and Sexual Activity    Alcohol use: Yes     Comment: varies    Drug use: No    Sexual activity: Yes     Partners: Male   Other Topics Concern    None   Social History Narrative    None     Social Determinants of Health     Financial Resource Strain:     Difficulty of Paying Living Expenses:    Food Insecurity:     Worried About Running Out of Food in the Last Year:     Ran Out of Food in the Last Year:    Transportation Needs:     Lack of Transportation (Medical):  Lack of Transportation (Non-Medical):    Physical Activity:     Days of Exercise per Week:     Minutes of Exercise per Session:    Stress:     Feeling of Stress :    Social Connections:     Frequency of Communication with Friends and Family:     Frequency of Social Gatherings with Friends and Family:     Attends [employer] Services:     Active Member of Clubs or Organizations:     Attends Club or Organization Meetings:     Marital Status:    Intimate Partner Violence:     Fear of Current or Ex-Partner:     Emotionally Abused:     Physically Abused:     Sexually Abused:        SCREENINGS             PHYSICAL EXAM    (up to 7 for level 4, 8 or more for level 5)     ED Triage Vitals   BP Temp Temp src Pulse Resp SpO2 Height Weight   -- -- -- -- -- -- -- --       Physical Exam  Vitals and nursing note reviewed. Constitutional:       General: She is not in acute distress. Appearance: Normal appearance. She is not ill-appearing or toxic-appearing. HENT:      Head: Normocephalic and atraumatic. Nose: Nose normal. No congestion. Mouth/Throat:      Mouth: Mucous membranes are moist.   Eyes:      General:         Right eye: No discharge. Left eye: No discharge. Conjunctiva/sclera: Conjunctivae normal.   Cardiovascular:      Rate and Rhythm: Regular rhythm. Tachycardia present. Pulses: Normal pulses.       Heart sounds: Normal heart sounds. No murmur heard. Pulmonary:      Effort: Pulmonary effort is normal. Tachypnea present. No accessory muscle usage or respiratory distress. Breath sounds: No stridor. Examination of the right-upper field reveals wheezing and rhonchi. Examination of the left-upper field reveals wheezing and rhonchi. Examination of the right-lower field reveals wheezing and rhonchi. Examination of the left-lower field reveals wheezing and rhonchi. Wheezing and rhonchi present. Abdominal:      General: Abdomen is flat. There is no distension. Palpations: Abdomen is soft. Tenderness: There is no abdominal tenderness. Musculoskeletal:         General: No deformity or signs of injury. Normal range of motion. Cervical back: Normal range of motion. Skin:     General: Skin is warm and dry. Capillary Refill: Capillary refill takes less than 2 seconds. Findings: No rash. Neurological:      General: No focal deficit present. Mental Status: She is alert. Mental status is at baseline. Motor: No weakness. Comments: Speaking normally. No facial asymmetry. Moving all 4 extremities. Normal gait. Psychiatric:         Mood and Affect: Mood normal.         EMERGENCY DEPARTMENT COURSE and DIFFERENTIAL DIAGNOSIS/MDM:   Vitals:    Vitals:    06/14/21 1706 06/14/21 1718 06/14/21 1756 06/14/21 1834   BP: (!) 148/72  127/71 126/83   Pulse: 105  103 98   Resp: 30 24 22 20   Temp: 98.6 °F (37 °C)      TempSrc: Oral      SpO2: (!) 84% 90% 91% (!) 89%   Weight: 108.9 kg (240 lb)      Height: 5' 5\" (1.651 m)          Patient presents to the emergency department with a complaint described above. Vital signs show tachycardia, tachypnea and hypoxia. She is not distressed but she clearly is a little bit uncomfortable. Her pulse ox was actually 70% on room air on arrival, she is wheezing as well.   I do suspect this is an asthma/COPD exacerbation, possibly underlying bronchitis, low suspicion for Covid based on her vaccination status. Certainly this could be a pulmonary embolus, lower suspicion for cardiac abnormality. I have ordered twelve-lead EKG, routine blood work that includes a troponin, I will get a D-dimer, I will get an x-ray, I will give breathing treatments, steroids and magnesium and I will reevaluate      DIAGNOSTIC RESULTS     Twelve lead EKG interpreted by myself:  A 12 lead EKG done at 1713, interpreted by myself, showed a regular rhythm at a rate of 100bpm.  The MO interval was normal.  The QRS complex was normal.  There was no ST segment elevation or depression, T wave inversion not present. QRS progression through precordial leads was grossly normal.  Interpretation: Normal sinus rhythm, no ST segment changes and no pattern consistent with acute ischemia or infarct    LABS:  Labs Reviewed   CBC WITH AUTO DIFFERENTIAL - Abnormal; Notable for the following components:       Result Value    WBC 15.9 (*)     Seg Neutrophils 79 (*)     Lymphocytes 14 (*)     Segs Absolute 12.60 (*)     All other components within normal limits   COMPREHENSIVE METABOLIC PANEL - Abnormal; Notable for the following components:    Glucose 245 (*)     CREATININE 0.49 (*)     Alkaline Phosphatase 133 (*)     All other components within normal limits   D-DIMER, QUANTITATIVE   MAGNESIUM   TROPONIN   URINE RT REFLEX TO CULTURE       All other labs were within normal range or not returned as of this dictation. RADIOLOGY:  XR CHEST PORTABLE   Final Result   *Borderline cardiomegaly with mild perihilar interstitial prominence   suggesting vascular congestion. *No focal airspace consolidation or overt CHF. ED Course as of Jun 14 1838   Mon Jun 14, 2021   1746 Borderline cardiomegaly with possible mild vascular congestion    [TS]   1746 D-dimer negative. Patient does have a mild leukocytosis, mild hyperglycemia.   At this time, I do suspect this is an acute bronchitis and I am going to treat for this. She will need to be admitted for further evaluation and treatment secondary to this acute asthma/COPD exacerbation with bronchitis    [TS]   1749 Patient is improving in the ED. [TS]   0034 Critical Care: The high probability of sudden, clinically significant deterioration in the patient's condition required the highest level of my preparedness to intervene urgently. The services I provided to this patient were to treat and/or prevent clinically significant deterioration. Services included the following: chart data review, reviewing nursing notes and/or old charts, documentation time, consultant collaboration regarding findings and treatment options, medication orders and management, direct patient care, vital sign assessments and ordering, interpreting and reviewing diagnostic studies/lab tests. Aggregate critical care time includes only time during which I was engaged in work directly related to the patient's care, as described above, whether at the bedside or elsewhere in the Emergency Department. It did not include time spent performing other reported procedures or the services of residents, students, nurses or physician assistants. Critical Care:   30 minutes    [TS]   0406 Patient amenable to the plan of admission    [TS]      ED Course User Index  [TS] Luly Herrera DO         PROCEDURES:  Unless otherwise noted below, none     Procedures    FINAL IMPRESSION      1. COPD with acute bronchitis ([facility] [address]    2. Hypoxia    3. Hypoglycemia          DISPOSITION/PLAN   DISPOSITION Admitted 06/14/2021 06:19:33 PM      PATIENT REFERRED TO:  No follow-up provider specified.     DISCHARGE MEDICATIONS:  New Prescriptions    No medications on file          (Please note that portions of this note were completed with a voice recognition program.  Efforts were made to edit the dictations but occasionally words are mis-transcribed.)    Luly Herrera DO (electronically signed)  Board Certified Emergency Physician          Isabela Archibald,   06/14/21 Juan Levo

## 2021-06-15 ENCOUNTER — APPOINTMENT (OUTPATIENT)
Dept: CT IMAGING | Age: 51
DRG: 189 | End: 2021-06-15
Payer: COMMERCIAL

## 2021-06-15 ENCOUNTER — APPOINTMENT (OUTPATIENT)
Dept: GENERAL RADIOLOGY | Age: 51
DRG: 189 | End: 2021-06-15
Payer: COMMERCIAL

## 2021-06-15 PROBLEM — J96.01 ACUTE HYPOXEMIC RESPIRATORY FAILURE (HCC): Status: ACTIVE | Noted: 2021-06-15

## 2021-06-15 PROBLEM — E66.9 OBESITY (BMI 30-39.9): Status: ACTIVE | Noted: 2021-06-15

## 2021-06-15 PROBLEM — G47.33 OSA (OBSTRUCTIVE SLEEP APNEA): Status: ACTIVE | Noted: 2021-06-15

## 2021-06-15 PROBLEM — Z72.0 TOBACCO ABUSE: Status: ACTIVE | Noted: 2021-06-15

## 2021-06-15 LAB
ADENOVIRUS PCR: NOT DETECTED
ANION GAP SERPL CALCULATED.3IONS-SCNC: 7 MMOL/L (ref 9–17)
BORDETELLA PARAPERTUSSIS: NOT DETECTED
BORDETELLA PERTUSSIS PCR: NOT DETECTED
BUN BLDV-MCNC: 11 MG/DL (ref 6–20)
BUN/CREAT BLD: ABNORMAL (ref 9–20)
C-REACTIVE PROTEIN: 16 MG/L (ref 0–5)
CALCIUM SERPL-MCNC: 9.1 MG/DL (ref 8.6–10.4)
CHLAMYDIA PNEUMONIAE BY PCR: NOT DETECTED
CHLORIDE BLD-SCNC: 104 MMOL/L (ref 98–107)
CO2: 25 MMOL/L (ref 20–31)
CORONAVIRUS 229E PCR: NOT DETECTED
CORONAVIRUS HKU1 PCR: NOT DETECTED
CORONAVIRUS NL63 PCR: NOT DETECTED
CORONAVIRUS OC43 PCR: NOT DETECTED
CREAT SERPL-MCNC: 0.47 MG/DL (ref 0.5–0.9)
EKG ATRIAL RATE: 100 BPM
EKG P-R INTERVAL: 122 MS
EKG Q-T INTERVAL: 350 MS
EKG QRS DURATION: 72 MS
EKG QTC CALCULATION (BAZETT): 451 MS
EKG R AXIS: 69 DEGREES
EKG T AXIS: 72 DEGREES
EKG VENTRICULAR RATE: 100 BPM
ESTIMATED AVERAGE GLUCOSE: 235 MG/DL
FERRITIN: 481 UG/L (ref 13–150)
GFR AFRICAN AMERICAN: >60 ML/MIN
GFR NON-AFRICAN AMERICAN: >60 ML/MIN
GFR SERPL CREATININE-BSD FRML MDRD: ABNORMAL ML/MIN/{1.73_M2}
GFR SERPL CREATININE-BSD FRML MDRD: ABNORMAL ML/MIN/{1.73_M2}
GLUCOSE BLD-MCNC: 228 MG/DL (ref 65–105)
GLUCOSE BLD-MCNC: 281 MG/DL (ref 65–105)
GLUCOSE BLD-MCNC: 301 MG/DL (ref 70–99)
GLUCOSE BLD-MCNC: 313 MG/DL (ref 65–105)
GLUCOSE BLD-MCNC: 458 MG/DL (ref 65–105)
HBA1C MFR BLD: 9.8 % (ref 4–6)
HCT VFR BLD CALC: 42.7 % (ref 36–46)
HEMOGLOBIN: 14 G/DL (ref 12–16)
HUMAN METAPNEUMOVIRUS PCR: NOT DETECTED
INFLUENZA A BY PCR: NOT DETECTED
INFLUENZA A H1 (2009) PCR: ABNORMAL
INFLUENZA A H1 PCR: ABNORMAL
INFLUENZA A H3 PCR: ABNORMAL
INFLUENZA B BY PCR: NOT DETECTED
LACTATE DEHYDROGENASE: 268 U/L (ref 135–214)
MCH RBC QN AUTO: 30.1 PG (ref 26–34)
MCHC RBC AUTO-ENTMCNC: 32.8 G/DL (ref 31–37)
MCV RBC AUTO: 91.7 FL (ref 80–100)
MYCOPLASMA PNEUMONIAE PCR: NOT DETECTED
NRBC AUTOMATED: ABNORMAL PER 100 WBC
PARAINFLUENZA 1 PCR: NOT DETECTED
PARAINFLUENZA 2 PCR: NOT DETECTED
PARAINFLUENZA 3 PCR: NOT DETECTED
PARAINFLUENZA 4 PCR: NOT DETECTED
PDW BLD-RTO: 13.1 % (ref 12.5–15.4)
PLATELET # BLD: 309 K/UL (ref 140–450)
PMV BLD AUTO: 6.5 FL (ref 6–12)
POTASSIUM SERPL-SCNC: 4.6 MMOL/L (ref 3.7–5.3)
RBC # BLD: 4.65 M/UL (ref 4–5.2)
RESP SYNCYTIAL VIRUS PCR: NOT DETECTED
RHINO/ENTEROVIRUS PCR: DETECTED
SARS-COV-2, PCR: NOT DETECTED
SARS-COV-2, RAPID: NOT DETECTED
SODIUM BLD-SCNC: 136 MMOL/L (ref 135–144)
SPECIMEN DESCRIPTION: ABNORMAL
SPECIMEN DESCRIPTION: NORMAL
WBC # BLD: 14.6 K/UL (ref 3.5–11)

## 2021-06-15 PROCEDURE — 99223 1ST HOSP IP/OBS HIGH 75: CPT | Performed by: FAMILY MEDICINE

## 2021-06-15 PROCEDURE — 6370000000 HC RX 637 (ALT 250 FOR IP): Performed by: NURSE PRACTITIONER

## 2021-06-15 PROCEDURE — 82728 ASSAY OF FERRITIN: CPT

## 2021-06-15 PROCEDURE — 97535 SELF CARE MNGMENT TRAINING: CPT

## 2021-06-15 PROCEDURE — 6360000002 HC RX W HCPCS: Performed by: NURSE PRACTITIONER

## 2021-06-15 PROCEDURE — 2580000003 HC RX 258: Performed by: FAMILY MEDICINE

## 2021-06-15 PROCEDURE — 97530 THERAPEUTIC ACTIVITIES: CPT

## 2021-06-15 PROCEDURE — 93005 ELECTROCARDIOGRAM TRACING: CPT | Performed by: FAMILY MEDICINE

## 2021-06-15 PROCEDURE — 94761 N-INVAS EAR/PLS OXIMETRY MLT: CPT

## 2021-06-15 PROCEDURE — 71260 CT THORAX DX C+: CPT

## 2021-06-15 PROCEDURE — 36415 COLL VENOUS BLD VENIPUNCTURE: CPT

## 2021-06-15 PROCEDURE — 83615 LACTATE (LD) (LDH) ENZYME: CPT

## 2021-06-15 PROCEDURE — 94640 AIRWAY INHALATION TREATMENT: CPT

## 2021-06-15 PROCEDURE — 82947 ASSAY GLUCOSE BLOOD QUANT: CPT

## 2021-06-15 PROCEDURE — 6360000004 HC RX CONTRAST MEDICATION: Performed by: FAMILY MEDICINE

## 2021-06-15 PROCEDURE — APPSS180 APP SPLIT SHARED TIME > 60 MINUTES: Performed by: NURSE PRACTITIONER

## 2021-06-15 PROCEDURE — 87635 SARS-COV-2 COVID-19 AMP PRB: CPT

## 2021-06-15 PROCEDURE — 85027 COMPLETE CBC AUTOMATED: CPT

## 2021-06-15 PROCEDURE — 87449 NOS EACH ORGANISM AG IA: CPT

## 2021-06-15 PROCEDURE — 2580000003 HC RX 258: Performed by: NURSE PRACTITIONER

## 2021-06-15 PROCEDURE — 86140 C-REACTIVE PROTEIN: CPT

## 2021-06-15 PROCEDURE — 97166 OT EVAL MOD COMPLEX 45 MIN: CPT

## 2021-06-15 PROCEDURE — 80048 BASIC METABOLIC PNL TOTAL CA: CPT

## 2021-06-15 PROCEDURE — 0202U NFCT DS 22 TRGT SARS-COV-2: CPT

## 2021-06-15 PROCEDURE — 2700000000 HC OXYGEN THERAPY PER DAY

## 2021-06-15 PROCEDURE — 2060000000 HC ICU INTERMEDIATE R&B

## 2021-06-15 PROCEDURE — 99254 IP/OBS CNSLTJ NEW/EST MOD 60: CPT | Performed by: INTERNAL MEDICINE

## 2021-06-15 PROCEDURE — 71045 X-RAY EXAM CHEST 1 VIEW: CPT

## 2021-06-15 PROCEDURE — 94660 CPAP INITIATION&MGMT: CPT

## 2021-06-15 PROCEDURE — 97162 PT EVAL MOD COMPLEX 30 MIN: CPT

## 2021-06-15 RX ORDER — PANTOPRAZOLE SODIUM 40 MG/1
40 TABLET, DELAYED RELEASE ORAL
Status: DISCONTINUED | OUTPATIENT
Start: 2021-06-15 | End: 2021-06-17 | Stop reason: HOSPADM

## 2021-06-15 RX ORDER — FUROSEMIDE 10 MG/ML
40 INJECTION INTRAMUSCULAR; INTRAVENOUS ONCE
Status: COMPLETED | OUTPATIENT
Start: 2021-06-15 | End: 2021-06-15

## 2021-06-15 RX ORDER — SODIUM CHLORIDE 0.9 % (FLUSH) 0.9 %
10 SYRINGE (ML) INJECTION PRN
Status: DISCONTINUED | OUTPATIENT
Start: 2021-06-15 | End: 2021-06-17 | Stop reason: HOSPADM

## 2021-06-15 RX ORDER — METHYLPREDNISOLONE SODIUM SUCCINATE 40 MG/ML
40 INJECTION, POWDER, LYOPHILIZED, FOR SOLUTION INTRAMUSCULAR; INTRAVENOUS EVERY 6 HOURS
Status: DISCONTINUED | OUTPATIENT
Start: 2021-06-15 | End: 2021-06-16

## 2021-06-15 RX ORDER — METHYLPREDNISOLONE SODIUM SUCCINATE 40 MG/ML
40 INJECTION, POWDER, LYOPHILIZED, FOR SOLUTION INTRAMUSCULAR; INTRAVENOUS EVERY 6 HOURS
Status: DISCONTINUED | OUTPATIENT
Start: 2021-06-15 | End: 2021-06-15

## 2021-06-15 RX ORDER — TRAZODONE HYDROCHLORIDE 50 MG/1
100 TABLET ORAL NIGHTLY
Status: DISCONTINUED | OUTPATIENT
Start: 2021-06-15 | End: 2021-06-17 | Stop reason: HOSPADM

## 2021-06-15 RX ORDER — 0.9 % SODIUM CHLORIDE 0.9 %
70 INTRAVENOUS SOLUTION INTRAVENOUS ONCE
Status: COMPLETED | OUTPATIENT
Start: 2021-06-15 | End: 2021-06-15

## 2021-06-15 RX ADMIN — METHYLPREDNISOLONE SODIUM SUCCINATE 40 MG: 40 INJECTION, POWDER, FOR SOLUTION INTRAMUSCULAR; INTRAVENOUS at 12:13

## 2021-06-15 RX ADMIN — SODIUM CHLORIDE, PRESERVATIVE FREE 10 ML: 5 INJECTION INTRAVENOUS at 08:24

## 2021-06-15 RX ADMIN — IPRATROPIUM BROMIDE AND ALBUTEROL SULFATE 1 AMPULE: .5; 3 SOLUTION RESPIRATORY (INHALATION) at 11:26

## 2021-06-15 RX ADMIN — DOXYCYCLINE HYCLATE 100 MG: 100 TABLET, COATED ORAL at 08:23

## 2021-06-15 RX ADMIN — IPRATROPIUM BROMIDE AND ALBUTEROL SULFATE 1 AMPULE: .5; 3 SOLUTION RESPIRATORY (INHALATION) at 20:21

## 2021-06-15 RX ADMIN — INSULIN LISPRO 4 UNITS: 100 INJECTION, SOLUTION INTRAVENOUS; SUBCUTANEOUS at 12:13

## 2021-06-15 RX ADMIN — PREDNISONE 40 MG: 20 TABLET ORAL at 08:23

## 2021-06-15 RX ADMIN — IOPAMIDOL 75 ML: 755 INJECTION, SOLUTION INTRAVENOUS at 15:02

## 2021-06-15 RX ADMIN — INSULIN LISPRO 2 UNITS: 100 INJECTION, SOLUTION INTRAVENOUS; SUBCUTANEOUS at 08:29

## 2021-06-15 RX ADMIN — SODIUM CHLORIDE 70 ML: 9 INJECTION, SOLUTION INTRAVENOUS at 17:20

## 2021-06-15 RX ADMIN — CLONAZEPAM 1 MG: 1 TABLET ORAL at 21:46

## 2021-06-15 RX ADMIN — INSULIN GLARGINE 30 UNITS: 100 INJECTION, SOLUTION SUBCUTANEOUS at 17:53

## 2021-06-15 RX ADMIN — ENOXAPARIN SODIUM 40 MG: 40 INJECTION SUBCUTANEOUS at 08:23

## 2021-06-15 RX ADMIN — IPRATROPIUM BROMIDE AND ALBUTEROL SULFATE 1 AMPULE: .5; 3 SOLUTION RESPIRATORY (INHALATION) at 16:05

## 2021-06-15 RX ADMIN — SODIUM CHLORIDE, PRESERVATIVE FREE 10 ML: 5 INJECTION INTRAVENOUS at 21:51

## 2021-06-15 RX ADMIN — METHYLPREDNISOLONE SODIUM SUCCINATE 40 MG: 40 INJECTION, POWDER, FOR SOLUTION INTRAMUSCULAR; INTRAVENOUS at 17:47

## 2021-06-15 RX ADMIN — SODIUM CHLORIDE, PRESERVATIVE FREE 10 ML: 5 INJECTION INTRAVENOUS at 10:43

## 2021-06-15 RX ADMIN — TRAZODONE HYDROCHLORIDE 100 MG: 50 TABLET ORAL at 21:46

## 2021-06-15 RX ADMIN — AMLODIPINE BESYLATE 5 MG: 5 TABLET ORAL at 21:46

## 2021-06-15 RX ADMIN — PANTOPRAZOLE SODIUM 40 MG: 40 TABLET, DELAYED RELEASE ORAL at 12:13

## 2021-06-15 RX ADMIN — INSULIN LISPRO 12 UNITS: 100 INJECTION, SOLUTION INTRAVENOUS; SUBCUTANEOUS at 17:53

## 2021-06-15 RX ADMIN — INSULIN LISPRO 4 UNITS: 100 INJECTION, SOLUTION INTRAVENOUS; SUBCUTANEOUS at 21:46

## 2021-06-15 RX ADMIN — SODIUM CHLORIDE, PRESERVATIVE FREE 10 ML: 5 INJECTION INTRAVENOUS at 15:03

## 2021-06-15 RX ADMIN — CEFTRIAXONE SODIUM 1000 MG: 1 INJECTION, POWDER, FOR SOLUTION INTRAMUSCULAR; INTRAVENOUS at 17:47

## 2021-06-15 RX ADMIN — IPRATROPIUM BROMIDE AND ALBUTEROL SULFATE 1 AMPULE: .5; 3 SOLUTION RESPIRATORY (INHALATION) at 07:44

## 2021-06-15 RX ADMIN — ATORVASTATIN CALCIUM 10 MG: 10 TABLET, FILM COATED ORAL at 08:23

## 2021-06-15 RX ADMIN — FUROSEMIDE 40 MG: 10 INJECTION INTRAMUSCULAR; INTRAVENOUS at 10:43

## 2021-06-15 RX ADMIN — DOXYCYCLINE HYCLATE 100 MG: 100 TABLET, COATED ORAL at 21:46

## 2021-06-15 ASSESSMENT — ENCOUNTER SYMPTOMS
BLOOD IN STOOL: 0
VOMITING: 0
NAUSEA: 1
DIARRHEA: 0
CONSTIPATION: 0
COUGH: 1
SHORTNESS OF BREATH: 1
SORE THROAT: 1
ABDOMINAL PAIN: 0
WHEEZING: 0
RHINORRHEA: 1
STRIDOR: 0

## 2021-06-15 ASSESSMENT — PAIN SCALES - GENERAL
PAINLEVEL_OUTOF10: 0

## 2021-06-15 NOTE — CARE COORDINATION
Case Management Initial Discharge Plan  Gal Hester,             Met with:patient to discuss discharge plans. Information verified: address, contacts, phone number, , insurance Yes  Insurance Provider: [employer]    Emergency Contact/Next of Kin name & number: spouse Stan Gill   Who are involved in patient's support system? Friend, spouse    PCP: Sandi Lozano MD  Date of last visit: last month    Discharge Planning    Living Arrangements:  Spouse/Significant Other, Children     Home has 2 stories  few stairs to climb to get into front door, 1 flight stairs to climb to reach second floor  Location of bedroom/bathroom in home 2    Patient able to perform ADL's:Independent    Current Services (outpatient & in home) none  DME equipment: glucometer  DME provider:     Is patient receiving oral anticoagulation therapy? No    Potential Assistance Needed:  Durable Medical Equipment    Patient agreeable to home care: No  [place] of choice provided:  no    Prior SNF/Rehab Placement and Facility: no  Agreeable to SNF/Rehab: No  [place] of choice provided: no     Evaluation: no    Expected Discharge date:  06/15/21    Patient expects to be discharged to:  home    If home: is the family and/or caregiver wiling & able to provide support at home? Yes, if needed  Who will be providing this support? Spouse, friend    Follow Up Appointment: Best Day/ Time:      Transportation provider: self  Transportation arrangements needed for discharge: No    Readmission Risk              Risk of Unplanned Readmission:  12             Does patient have a readmission risk score greater than 14?: No  If yes, follow-up appointment must be made within 7 days of discharge.      Goals of Care: better breathing      Educated patient on transitional options, provided freedom of choice and are agreeable with plan      Discharge Plan: home with spouse, independent - needs new nebulizer          Electronically signed by Wes Rosas ALAN DE LA TORRE on 6/15/21 at 9:07 AM EDT

## 2021-06-15 NOTE — PROGRESS NOTES
Pt admitted to room 2140 from ER. Oriented to room and call light/tv controls. Bed in lowest position, wheels locked, 2/4 side rails up  Call light in reach, room free of clutter, adequate lighting provided. Admission database, vital signs and assessment as charted.

## 2021-06-15 NOTE — PLAN OF CARE
Problem: Discharge Planning:  Goal: Discharged to appropriate level of care  Description: Discharged to appropriate level of care  Outcome: Ongoing  Goal: Participates in care planning  Description: Participates in care planning  Outcome: Ongoing     Problem: Airway Clearance - Ineffective:  Goal: Clear lung sounds  Description: Clear lung sounds  Outcome: Ongoing  Goal: Ability to maintain a clear airway will improve  Description: Ability to maintain a clear airway will improve  Outcome: Ongoing     Problem: Fluid Volume - Deficit:  Goal: Achieves intake and output within specified parameters  Description: Achieves intake and output within specified parameters  Outcome: Ongoing     Problem: Gas Exchange - Impaired:  Goal: Levels of oxygenation will improve  Description: Levels of oxygenation will improve  Outcome: Ongoing     Problem: Hyperthermia:  Goal: Ability to maintain a body temperature in the normal range will improve  Description: Ability to maintain a body temperature in the normal range will improve  Outcome: Ongoing     Problem: Tobacco Use:  Goal: Will participate in inpatient tobacco-use cessation counseling  Description: Will participate in inpatient tobacco-use cessation counseling  Outcome: Ongoing     Problem:  Activity Intolerance:  Goal: Ability to tolerate increased activity will improve  Description: Ability to tolerate increased activity will improve  Outcome: Ongoing     Problem: Breathing Pattern - Ineffective:  Goal: Ability to achieve and maintain a regular respiratory rate will improve  Description: Ability to achieve and maintain a regular respiratory rate will improve  Outcome: Ongoing

## 2021-06-15 NOTE — H&P
[facility]  Office: [address], DO, Александр Meghana, DO, Josefinaraisa Scott, DO, Pamela Garzal Blood, DO, Jed Blankenship MD, Steven Etienne MD, Darryle So, MD, Carlita Gu MD, Adonay Caban MD, Stephen Paez MD, Rg Roberts MD, Fannie Licea MD, Cindy Live, DO, Leonarda Mora MD, Bennie Jo DO, Pricilla Donohue MD,  Trisha Pavon DO, Bart Aguilera MD, Debra Huntley MD, Ras Hackett MD, Jeronimo Solano MD, Sangeeta Cardona, [facility], [facility], CNP, Vasile Cunningham, CNP, Cosmo Arnold, CNS, René Love, CNP, Erasmo Ha, CNP, Joe Bryant, CNP, Prince Lynn, CNP, Gisela Caputo, CNP, Phuong Rizvi PA-C, Sudheer Dickson, DNP, Mainor Camarena, CNP, Brian Olsen, CNP, Silvestre Cabrera, CNP, Tawana Rubinstein, CNP, Keysha Wayne, CNP, Sha Dsouza, CNP         [facility]   [address] [facility]    HISTORY AND PHYSICAL EXAMINATION            Date:   6/15/2021  Patient name:  Mary Elizabeth  Date of admission:  6/14/2021  4:59 PM  MRN:   [id number]  Account:  [de-identified]  YOB: 1970  PCP:    Antwan Alcala MD  Room:   [address] [place] [address]  Code Status:    Full Code    Chief Complaint:     Chief Complaint   Patient presents with    Shortness of Breath     2 DAYS OF SHORTNESS OF BREATH AND OUT OF INHALER. PT REPORTS USING DAUGHTERS NEBULIZER MACHINE AND MEDS. HX OF COUGH       History Obtained From:     patient    History of Present Illness:     Mary Elizabeth is a 44 y.o. Unavailable/unknown female who presents with Shortness of Breath (2 DAYS OF SHORTNESS OF BREATH AND OUT OF INHALER. PT REPORTS USING DAUGHTERS NEBULIZER MACHINE AND MEDS. HX OF COUGH)   and is admitted to the hospital for the management of Acute exacerbation of chronic obstructive pulmonary disease ([facility] [address] Patient states her symptoms started a couple of days ago.   She says initially she had nasal congestion, and earache and sore throat with chills and diaphoresis and a dry cough. She says yesterday she developed shortness of breath earlier in the day and then later felt like she could not breathe. She says yesterday she stayed with her daughter to use her daughter's home nebulizer because her meds have run out. She does report a history of ATUL and COPD but she is not compliant with her medications or her CPAP. She also has a history of diabetes but says her diabetes is under control so she stopped taking her medications until April when she had a hemoglobin A1c of 10.2. She says at that time she was started back on diabetic medications. A1c's available in my chart  10. 2High  7.3High  9.7High  9.6High  9.1High      Her urine is showing few bacteria and trace hemoglobin with positive nitrates. She states she often has UTIs without urinary symptoms. Per the ER doctor her vitals initially showed tachycardia, tachypnea and hypoxia and she appeared uncomfortable (pulse ox was 70% on room air upon arrival to the ER) patient received breathing treatments, Solu-Medrol and magnesium. Per the ER documents her symptoms did somewhat improve. Patient denies a history of home O2 use. For any documentation she was initially placed on 3 L and then it was increased to 6 L nasal cannula and ultimately she was placed on BiPAP overnight and 35 to 40% FiO2. During my assessment she is on 6 L nasal cannula. She is able to carry on a conversation with me without becoming profoundly tachypneic or hypoxic but when she is up to the bathroom her sats do drop into the 80s. Plan to change her prednisone p.o. to Solu-Medrol, continue duo nebs as ordered and consult pulmonary medicine. Continue Rocephin to cover for UTI  Continue doxycycline for possible pneumonia  Lasix 40 mg IV x1. Patient has crackles on assessment    Patient encouraged to reach out to her PCP and/or pulmonary to reevaluate her ATUL and to be compliant with her CPAP.     She tells me during my assessment she takes her insulin most the time. I  explained the importance of medication compliance to prevent further complications. She reports understanding. Past Medical History:     Past Medical History:   Diagnosis Date    ADD (attention deficit disorder)     Arrhythmia     SVT then had an ablation    Asthma     Back pain     Bronchitis     Caffeine use     6 cola / day -Excessive use    Cholelithiasis     COPD (chronic obstructive pulmonary disease) (HCC)     Depression     Diabetes mellitus ([facility] [address]     Diabetes mellitus type 2, noninsulin dependent (HCC)     Diarrhea     GERD (gastroesophageal reflux disease)     Hyperlipidemia     Kidney infection     Kidney stone     Kidney stones     Pneumonia     Sleep apnea     no cpap or bipap        Past Surgical History:     Past Surgical History:   Procedure Laterality Date     SECTION   &     CHOLECYSTECTOMY      COLONOSCOPY      CYSTOSCOPY      W/ stent placement    HAND SURGERY      HYSTERECTOMY      HYSTERECTOMY, TOTAL ABDOMINAL  2005    LITHOTRIPSY      TONSILLECTOMY AND ADENOIDECTOMY      TUBAL LIGATION          Medications Prior to Admission:     Prior to Admission medications    Medication Sig Start Date End Date Taking? Authorizing Provider   ALPRAZolam (XANAX) 0.25 MG tablet Take 0.25 mg by mouth every 12 hours as needed. 21  Yes Historical Provider, MD   clonazePAM (KLONOPIN) 1 MG tablet Take 0.5-1 mg by mouth every 12 hours as needed.  21  Yes Historical Provider, MD   AMLODIPINE BESYLATE PO Take 5 mg by mouth daily   Yes Historical Provider, MD   atorvastatin (LIPITOR) 10 MG tablet Take 10 mg by mouth daily  17  Yes Historical Provider, MD   PROAIR  (95 Base) MCG/ACT inhaler  12/15/17  Yes Historical Provider, MD   traZODone (DESYREL) 50 MG tablet Take 100 mg by mouth nightly  17  Yes Historical Provider, MD   Insulin Aspart (NOVOLOG SC) Inject into the skin   Yes Historical Provider, MD   Insulin Detemir (LEVEMIR SC) Inject 30 Units into the skin every evening    Yes Historical Provider, MD        Allergies:     Zithromax [azithromycin]    Social History:     Tobacco:    reports that she has been smoking. She has a 20.00 pack-year smoking history. She has never used smokeless tobacco.  Alcohol:      reports current alcohol use. Drug Use:  reports no history of drug use. Family History:     Family History   Family history unknown: Yes       Review of Systems:     Positive and Negative as described in HPI. Review of Systems   Constitutional: Positive for chills and diaphoresis. Negative for fever. HENT: Positive for congestion, rhinorrhea and sore throat. Negative for hearing loss. Respiratory: Positive for cough and shortness of breath. Negative for wheezing and stridor. Cardiovascular: Positive for leg swelling. Negative for chest pain and palpitations. Gastrointestinal: Positive for nausea. Negative for abdominal pain, blood in stool, constipation, diarrhea and vomiting. Genitourinary: Negative for dysuria and frequency. Musculoskeletal: Negative for myalgias. Skin: Negative for rash. Neurological: Negative for dizziness, seizures and headaches. Psychiatric/Behavioral: The patient is not nervous/anxious. Physical Exam:   /80   Pulse 99   Temp 98 °F (36.7 °C) (Oral)   Resp 24   Ht 5' 5\" (1.651 m)   Wt 230 lb 13.2 oz (104.7 kg)   SpO2 91%   BMI 38.41 kg/m²   Temp (24hrs), Av °F (36.7 °C), Min:97.6 °F (36.4 °C), Max:98.6 °F (37 °C)    Recent Labs     21  2143 06/15/21  0743   POCGLU 309* 281*       Intake/Output Summary (Last 24 hours) at 6/15/2021 1053  Last data filed at 6/15/2021 0601  Gross per 24 hour   Intake 300 ml   Output 750 ml   Net -450 ml       Physical Exam  Vitals and nursing note reviewed.    HENT:      Mouth/Throat:      Mouth: Mucous membranes are moist.   Eyes:      Extraocular Movements: Extraocular movements intact. Conjunctiva/sclera: Conjunctivae normal.   Cardiovascular:      Rate and Rhythm: Regular rhythm. Tachycardia present. Pulses: Normal pulses. Heart sounds: Normal heart sounds. Pulmonary:      Effort: Pulmonary effort is normal.      Breath sounds: Examination of the right-upper field reveals wheezing and rhonchi. Examination of the left-upper field reveals wheezing and rhonchi. Examination of the right-middle field reveals rhonchi. Examination of the left-middle field reveals wheezing and rhonchi. Examination of the right-lower field reveals rales. Examination of the left-lower field reveals rales. Decreased breath sounds, wheezing, rhonchi and rales present. Abdominal:      General: Bowel sounds are normal.      Palpations: Abdomen is soft. Musculoskeletal:         General: Normal range of motion. Cervical back: Neck supple. Skin:     General: Skin is warm and dry. Capillary Refill: Capillary refill takes less than 2 seconds. Neurological:      Mental Status: She is alert and oriented to person, place, and time.    Psychiatric:         Mood and Affect: Mood normal.         Investigations:      Laboratory Testing:  Recent Results (from the past 24 hour(s))   CBC Auto Differential    Collection Time: 06/14/21  5:13 PM   Result Value Ref Range    WBC 15.9 (H) 3.5 - 11.0 k/uL    RBC 5.03 4.0 - 5.2 m/uL    Hemoglobin 15.0 12.0 - 16.0 g/dL    Hematocrit 45.4 36 - 46 %    MCV 90.1 80 - 100 fL    MCH 29.9 26 - 34 pg    MCHC 33.1 31 - 37 g/dL    RDW 12.9 12.5 - 15.4 %    Platelets 941 069 - 776 k/uL    MPV 6.6 6.0 - 12.0 fL    NRBC Automated NOT REPORTED per 100 WBC    Differential Type NOT REPORTED     Seg Neutrophils 79 (H) 36 - 66 %    Lymphocytes 14 (L) 24 - 44 %    Monocytes 5 2 - 11 %    Eosinophils % 1 1 - 4 %    Basophils 1 0 - 2 %    Immature Granulocytes NOT REPORTED 0 %    Segs Absolute 12.60 (H) 1.8 - 7.7 k/uL    Absolute Lymph # 2.20 1.0 - 4.8 k/uL    Absolute Mono # 0.80 0.1 - 1.2 k/uL    Absolute Eos # 0.10 0.0 - 0.4 k/uL    Basophils Absolute 0.10 0.0 - 0.2 k/uL    Absolute Immature Granulocyte NOT REPORTED 0.00 - 0.30 k/uL    WBC Morphology NOT REPORTED     RBC Morphology NOT REPORTED     Platelet Estimate NOT REPORTED    Comprehensive Metabolic Panel    Collection Time: 06/14/21  5:13 PM   Result Value Ref Range    Glucose 245 (H) 70 - 99 mg/dL    BUN 9 6 - 20 mg/dL    CREATININE 0.49 (L) 0.50 - 0.90 mg/dL    Bun/Cre Ratio NOT REPORTED 9 - 20    Calcium 9.6 8.6 - 10.4 mg/dL    Sodium 137 135 - 144 mmol/L    Potassium 3.8 3.7 - 5.3 mmol/L    Chloride 99 98 - 107 mmol/L    CO2 24 20 - 31 mmol/L    Anion Gap 14 9 - 17 mmol/L    Alkaline Phosphatase 133 (H) 35 - 104 U/L    ALT 9 5 - 33 U/L    AST 10 <32 U/L    Total Bilirubin 0.94 0.3 - 1.2 mg/dL    Total Protein 8.2 6.4 - 8.3 g/dL    Albumin 4.3 3.5 - 5.2 g/dL    Albumin/Globulin Ratio 1.1 1.0 - 2.5    GFR Non-African American >60 >60 mL/min    GFR African American >60 >60 mL/min    GFR Comment          GFR Staging NOT REPORTED    D-Dimer, Quantitative    Collection Time: 06/14/21  5:13 PM   Result Value Ref Range    D-Dimer, Quant 0.46 mg/L FEU   Magnesium    Collection Time: 06/14/21  5:13 PM   Result Value Ref Range    Magnesium 1.8 1.6 - 2.6 mg/dL   Troponin    Collection Time: 06/14/21  5:13 PM   Result Value Ref Range    Troponin, High Sensitivity 10 0 - 14 ng/L    Troponin T NOT REPORTED <0.03 ng/mL    Troponin Interp NOT REPORTED    Hemoglobin A1C    Collection Time: 06/14/21  5:13 PM   Result Value Ref Range    Hemoglobin A1C 9.8 (H) 4.0 - 6.0 %    Estimated Avg Glucose 235 mg/dL   EKG 12 Lead    Collection Time: 06/14/21  5:13 PM   Result Value Ref Range    Ventricular Rate 100 BPM    Atrial Rate 100 BPM    P-R Interval 122 ms    QRS Duration 72 ms    Q-T Interval 350 ms    QTc Calculation (Bazett) 451 ms    R Axis 69 degrees    T Axis 72 degrees   POC Glucose Fingerstick    Collection Time: 06/14/21  9:43 PM Result Value Ref Range    POC Glucose 309 (H) 65 - 105 mg/dL   Lactate, Sepsis    Collection Time: 06/14/21 10:24 PM   Result Value Ref Range    Lactic Acid, Sepsis 1.6 0.5 - 1.9 mmol/L    Lactic Acid, Sepsis, Whole Blood NOT REPORTED 0.5 - 1.9 mmol/L   Brain Natriuretic Peptide    Collection Time: 06/14/21 10:24 PM   Result Value Ref Range    Pro- (H) <300 pg/mL    BNP Interpretation Pro-BNP Reference Range:    Urinalysis Reflex to Culture    Collection Time: 06/14/21 11:05 PM    Specimen: Urine, clean catch   Result Value Ref Range    Color, UA YELLOW YELLOW    Turbidity UA CLEAR CLEAR    Glucose, Ur 3+ (A) NEGATIVE    Bilirubin Urine NEGATIVE NEGATIVE    Ketones, Urine MODERATE (A) NEGATIVE    Specific Gravity, UA 1.025 1.005 - 1.030    Urine Hgb TRACE (A) NEGATIVE    pH, UA 6.0 5.0 - 8.0    Protein, UA TRACE (A) NEGATIVE    Urobilinogen, Urine Normal Normal    Nitrite, Urine POSITIVE (A) NEGATIVE    Leukocyte Esterase, Urine NEGATIVE NEGATIVE    Urinalysis Comments NOT REPORTED    Microscopic Urinalysis    Collection Time: 06/14/21 11:05 PM   Result Value Ref Range    -          WBC, UA 2 TO 5 0 - 5 /HPF    RBC, UA 0 TO 2 0 - 2 /HPF    Casts UA NOT REPORTED /LPF    Crystals, UA NOT REPORTED None /HPF    Epithelial Cells UA 0 TO 2 0 - 5 /HPF    Renal Epithelial, UA NOT REPORTED 0 /HPF    Bacteria, UA FEW (A) None    Mucus, UA 1+ (A) None    Trichomonas, UA NOT REPORTED None    Amorphous, UA NOT REPORTED None    Other Observations UA (A) NOT REQ. Utilizing a urinalysis as the only screening method to exclude a potential uropathogen can be unreliable in many patient populations. Rapid screening tests are less sensitive than culture and if UTI is a clinical possibility, culture should be considered despite a negative urinalysis.     Yeast, UA NOT REPORTED None   Basic Metabolic Panel w/ Reflex to MG    Collection Time: 06/15/21  5:37 AM   Result Value Ref Range    Glucose 301 (H) 70 - 99 mg/dL    BUN 11 6 - 20 mg/dL    CREATININE 0.47 (L) 0.50 - 0.90 mg/dL    Bun/Cre Ratio NOT REPORTED 9 - 20    Calcium 9.1 8.6 - 10.4 mg/dL    Sodium 136 135 - 144 mmol/L    Potassium 4.6 3.7 - 5.3 mmol/L    Chloride 104 98 - 107 mmol/L    CO2 25 20 - 31 mmol/L    Anion Gap 7 (L) 9 - 17 mmol/L    GFR Non-African American >60 >60 mL/min    GFR African American >60 >60 mL/min    GFR Comment          GFR Staging NOT REPORTED    CBC    Collection Time: 06/15/21  5:37 AM   Result Value Ref Range    WBC 14.6 (H) 3.5 - 11.0 k/uL    RBC 4.65 4.0 - 5.2 m/uL    Hemoglobin 14.0 12.0 - 16.0 g/dL    Hematocrit 42.7 36 - 46 %    MCV 91.7 80 - 100 fL    MCH 30.1 26 - 34 pg    MCHC 32.8 31 - 37 g/dL    RDW 13.1 12.5 - 15.4 %    Platelets 295 382 - 854 k/uL    MPV 6.5 6.0 - 12.0 fL    NRBC Automated NOT REPORTED per 100 WBC   POC Glucose Fingerstick    Collection Time: 06/15/21  7:43 AM   Result Value Ref Range    POC Glucose 281 (H) 65 - 105 mg/dL       Imaging/Diagnostics:    XR CHEST PORTABLE    Result Date: 6/15/2021  Small bilateral effusions with scattered bilateral pulmonary infiltrates concerning for pneumonia. XR CHEST PORTABLE    Result Date: 6/14/2021  *Borderline cardiomegaly with mild perihilar interstitial prominence suggesting vascular congestion. *No focal airspace consolidation or overt CHF. Assessment :      Hospital Problems         Last Modified POA    * (Principal) Acute exacerbation of chronic obstructive pulmonary disease ([facility] [address] 6/14/2021 Yes    Hypoxia 6/14/2021 Yes    Bronchitis 6/14/2021 Yes    Type 2 diabetes mellitus with hyperglycemia, with long-term current use of insulin ([facility] [address] 6/15/2021 Yes    Obesity (BMI 30-39.9) 6/15/2021 Yes    Tobacco abuse 6/15/2021 Yes          Plan:     Patient status inpatient in the Med/Surge    Acute COPD exacerbation/hypoxia; oxygen to maintain sat greater than 90%, respiratory treatments as ordered, Solu-Medrol, BiPAP as needed.   Lasix 40 mg IV x1 patient encouraged to follow-up outpatient for new sleep study with compliance. Respiratory culture needed pulmonary consulted    Obesity; weight management and lifestyle modification per PCP outpatient    Tobacco abuse; nicotine patch. Encourage smoking cessation    Type 2 diabetes with hyperglycemia; use insulin sliding scale to cover glucose before meals and at bedtime, resume home Levemir. Diabetic diet. A1c 9.8. Encourage  diabetic compliance and follow-up with diabetic educator outpatient    DVT and GI prophylaxis    Resume home Norvasc, Lipitor, as needed Xanax and Klonopin        Consultations:   None    Patient is admitted as inpatient status because of co-morbidities listed above, severity of signs and symptoms as outlined, requirement for current medical therapies and most importantly because of direct risk to patient if care not provided in a hospital setting. Expected length of stay > 48 hours.     MAXIMINO Bhatti - CNP  6/15/2021  10:53 AM    Copy sent to Dr. Raissa Mariee MD

## 2021-06-15 NOTE — CARE COORDINATION
[facility] ICU Quality Flow/Interdisciplinary Rounds Progress Note    Quality Flow Rounds held on Trista 15, 2021 at [address] Attending:  Bedside Nurse, , Nursing Unit Leadership, Dietary, Respiratory Therapy, Physical Therapy, Occupational Therapy and Spiritual Care/    Anticipated Discharge Date:  Expected Discharge Date: 06/15/21    Anticipated Discharge Disposition: home    Readmission Risk              Risk of Unplanned Readmission:  13           Discussed patient goal for the day, patient clinical progression, and barriers to discharge.   The following Goal(s) of the Day/Commitment(s) have been identified:  Pulse Ox - Room Air, Pulse Ox - With Ambulation and DME order for nebulizer, high O2 needs, IV medications      Nitin Ravi RN  Trista 15, 2021

## 2021-06-15 NOTE — PLAN OF CARE
Problem: Discharge Planning:  Goal: Discharged to appropriate level of care  Outcome: Met This Shift  Goal: Participates in care planning  Outcome: Met This Shift     Problem: Airway Clearance - Ineffective:  Goal: Clear lung sounds  Outcome: Met This Shift  Goal: Ability to maintain a clear airway will improve  Outcome: Met This Shift     Problem: Fluid Volume - Deficit:  Goal: Achieves intake and output within specified parameters  Outcome: Met This Shift     Problem: Gas Exchange - Impaired:  Goal: Levels of oxygenation will improve  Outcome: Met This Shift     Problem: Hyperthermia:  Goal: Ability to maintain a body temperature in the normal range will improve  Outcome: Met This Shift     Problem: Tobacco Use:  Goal: Will participate in inpatient tobacco-use cessation counseling  Outcome: Met This Shift     Problem:  Activity Intolerance:  Goal: Ability to tolerate increased activity will improve  Outcome: Met This Shift     Problem: Breathing Pattern - Ineffective:  Goal: Ability to achieve and maintain a regular respiratory rate will improve  Outcome: Met This Shift     Problem: Tobacco Use:  Goal: Inpatient tobacco use cessation counseling participation  Outcome: Met This Shift     Problem: Falls - Risk of:  Goal: Will remain free from falls  Outcome: Met This Shift  Goal: Absence of physical injury  Outcome: Met This Shift

## 2021-06-15 NOTE — CONSULTS
PULMONARY & CRITICAL CARE MEDICINE CONSULT NOTE     Patient:  Radha Dutta  MRN: [id number]  Admit date: 6/14/2021  Primary Care Physician: Yordy Jenkins MD  Consulting Physician: Grey Garay MD  CODE Status: Full Code   LOS: 1    HISTORY     CHIEF COMPLAINT/REASON FOR CONSULT:    COPD exacerbation/pneumonia/acute hypoxic respiratory failure. HISTORY OF PRESENT ILLNESS:  The patient is a 44 y.o. female history of COPD severity not known, diabetes mellitus, obstructive sleep apnea intolerant/noncompliant with CPAP, chronic hypoxic respiratory failure supposed to be on home O2 but according to patient she was not using it and returned home O2 history of tobacco use and current smoker. According to patient about 4 days ago she started having symptoms of headache, nasal obstruction she feels like cold-like symptoms myalgia and diarrhea, her appetite was decreased she also had nausea without vomiting. According to patient she did not check her fever she did not know if she had fever. Along with cough and wheezing which started almost the same time, cough is productive of sputum. Shortness of breath was getting worse she came to emergency room as she could not breathe. According to patient she does have history of dyspnea on activities and exertion sometimes she gets shortness of breath on regular activities at other times she is able to do her regular activities without getting shortness of breath. Going the stairs up she usually gets short of breath. In the emergency room she had a chest x-ray done which was suggestive of mild bilateral infiltrate. She was being hypoxic since admission last night requiring 6 L of oxygen to maintain saturation 91 to 92%. According to patient she is feeling better today as far as her shortness of breath is concerned she is able to get out of bed to chair her cough and sputum is same. Her appetite is better today she is feeling more energetic.   This morning she was able to eat and denies diarrhea today or vomiting  She was placed on CPAP last night and according to patient she uses CPAP CPAP pressure was 5 cm. According to patient she had left sided procedures and for renal stone with surgery and she was told at that time that she had some lung puncture. She does not remember if she had a chest tube at that time. Records not available    Initial WBC count was 15. Chest x-ray was repeated today which showed slight increase infiltrate. On review of the chest x-ray she had left lower to mid lung chronic pleuroparenchymal change at the left base which was present on chest x-ray in 2006 also. CT scan of the chest just done which shows pleural-based pleural-parenchymal opacity laterally in left lower lung field. There are scattered mild areas of opacities in upper/lower lung field bilaterally. According to patient she had Covid vaccine both the doses in March/April. Nasopharyngeal respiratory panel was sent and it is pending.     She has history of smoking for 40 years 1 pack/day and she is a current smoker      PAST MEDICAL HISTORY:        Diagnosis Date    ADD (attention deficit disorder)     Arrhythmia     SVT then had an ablation    Asthma     Back pain     Bronchitis     Caffeine use     6 cola / day -Excessive use    Cholelithiasis     COPD (chronic obstructive pulmonary disease) (HCC)     Depression     Diabetes mellitus ([facility] [address]     Diabetes mellitus type 2, noninsulin dependent (HCC)     Diarrhea     GERD (gastroesophageal reflux disease)     Hyperlipidemia     Kidney infection     Kidney stone     Kidney stones     Pneumonia     Sleep apnea     no cpap or bipap     PAST SURGICAL HISTORY:        Procedure Laterality Date     SECTION   &     CHOLECYSTECTOMY      COLONOSCOPY      CYSTOSCOPY      W/ stent placement    HAND SURGERY      HYSTERECTOMY      HYSTERECTOMY, TOTAL ABDOMINAL  2005    LITHOTRIPSY      evening    Yes Historical Provider, MD     IMMUNIZATIONS:    There is no immunization history on file for this patient. REVIEW OF SYSTEMS:  General: Positive for fatigue, tiredness negative for chills or fever  ENT: Positive for headaches, nasal congestion, postnasal drip, negative for sore throat or visual changes  Allergy and Immunology: Positive for postnasal drip nasal congestion  Hematological and Lymphatic: negative for bleeding problems, swollen lymph nodes  Respiratory: positive for cough, shortness of breath, sputum changes, tachypnea and wheezing negative for hemoptysis or pleuritic pain  Cardiovascular: negative for edema or palpitations  Gastrointestinal: Positive for nausea and diarrhea, negative for abdominal pain vomiting  Genito-Urinary: negative for dysuria or urinary frequency/urgency  Musculoskeletal: negative for joint pain or joint swelling  Neurological: negative for numbness/tingling, seizures or weakness  Dermatological: negative for pruritus or rash    PHYSICAL EXAMINATION     VITAL SIGNS:   LAST-  /80   Pulse 99   Temp 98.1 °F (36.7 °C) (Oral)   Resp 16   Ht 5' 5\" (1.651 m)   Wt 230 lb 13.2 oz (104.7 kg)   SpO2 93%   BMI 38.41 kg/m²   8-24 HR RANGE-  TEMP Temp  Av °F (36.7 °C)  Min: 97.6 °F (36.4 °C)  Max: 98.6 °F (37 °C)   BP Systolic (48GOG), WHS:064 , Min:95 , JSN:596      Diastolic (12SOK), ZVB:41, Min:63, Max:88     PULSE Pulse  Av.1  Min: 71  Max: 105   RR Resp  Av.3  Min: 16  Max: 24   O2 SAT SpO2  Av.7 %  Min: 88 %  Max: 93 %   OXYGEN DELIVERY O2 Flow Rate (L/min)  Av L/min  Min: 6 L/min  Max: 6 L/min     SYSTEMIC EXAMINATION:   General appearance - well appearing, overweight, comfortable and in no acute distress.   She is able to talk in complete a small sentences  Mental status - alert, oriented to person, place, and time  Eyes - pupils equal and reactive, extraocular eye movements intact, sclera anicteric  Mouth - mucous membranes moist, pharynx normal without lesions. Large tongue, Mallampati 2  Neck - supple, no significant adenopathy, carotids upstroke normal bilaterally, no bruits. Short thick neck  Lymphatics - no palpable lymphadenopathy, no hepatosplenomegaly  Chest -mild tachypnea, no retractions or cyanosis. Air entry is present bilaterally with bilateral rhonchi, expiratory wheezing and she has mild crackles present at the bases  Heart - normal rate, regular rhythm, normal S1, S2, no murmurs, rubs, clicks or gallops  Abdomen - soft, nontender, nondistended, no masses or organomegaly  Neurological - motor and sensory grossly normal bilaterally  Musculoskeletal - no joint tenderness, deformity or swelling  Extremities - peripheral pulses normal, no pedal edema, no clubbing or cyanosis  Skin - normal coloration and turgor, no rashes, no suspicious skin lesions noted    DATA REVIEW     Medications: Current Inpatient  Scheduled Meds:   methylPREDNISolone  40 mg Intravenous Q6H    pantoprazole  40 mg Oral QAM AC    sodium chloride  70 mL Intravenous Once    atorvastatin  10 mg Oral Daily    insulin lispro  0-12 Units Subcutaneous TID WC    insulin lispro  0-6 Units Subcutaneous Nightly    sodium chloride flush  5-40 mL Intravenous 2 times per day    enoxaparin  40 mg Subcutaneous Daily    ipratropium-albuterol  1 ampule Inhalation Q4H WA    doxycycline hyclate  100 mg Oral Q12H    cefTRIAXone (ROCEPHIN) IV  1,000 mg Intravenous Q24H    insulin glargine  30 Units Subcutaneous QPM    amLODIPine  5 mg Oral Nightly    nicotine  1 patch Transdermal Daily     Continuous Infusions:   dextrose      [Held by provider] sodium chloride Stopped (06/14/21 4787)    sodium chloride       INPUT/OUTPUT:  In: 200 [P.O.:200]  Out: 1550 [Urine:1550]    LABS:-  ABG:   No results for input(s): POCPH, POCPCO2, POCPO2, POCHCO3, OTTA1DEK in the last 72 hours.   CBC:   Recent Labs     06/14/21  1713 06/15/21  0537   WBC 15.9* 14.6*   HGB 15.0 14.0 HCT 45.4 42.7   MCV 90.1 91.7    309   LYMPHOPCT 14*  --    RBC 5.03 4.65   MCH 29.9 30.1   MCHC 33.1 32.8   RDW 12.9 13.1     BMP:   Recent Labs     06/14/21  1713 06/15/21  0537    136   K 3.8 4.6   CL 99 104   CO2 24 25   BUN 9 11   CREATININE 0.49* 0.47*   GLUCOSE 245* 301*     Liver Function Test:   Recent Labs     06/14/21  1713   PROT 8.2   LABALBU 4.3   ALT 9   AST 10   ALKPHOS 133*   BILITOT 0.94     Amylase/Lipase:  No results for input(s): AMYLASE, LIPASE in the last 72 hours. Coagulation Profile:   No results for input(s): INR, PROTIME, APTT in the last 72 hours. Cardiac Enzymes:  No results for input(s): CKTOTAL, CKMB, CKMBINDEX, TROPONINI in the last 72 hours. Lactic Acid:  Lab Results   Component Value Date    LACTA 2.1 05/07/2021     BNP:   No results found for: BNP  D-Dimer:  Lab Results   Component Value Date    DDIMER 0.46 06/14/2021     Others:   No results found for: TSH, Q1RKEHE, Z4AEWMZ, THYROIDAB, FT3, T4FREE  No results found for: JD, RHEUMFACTOR, SEDRATE, CRP  No results found for: Galilea Xenia  No results found for: IRON, TIBC, FERRITIN  No results found for: SPEP, UPEP  No results found for: PSA, CEA, , ZI4007,   Microbiology:  No results for input(s): SPECDESC, SPECDESC, SPECIAL, CULTURE, CULTURE, STATUS, ORG, CDIFFTOXPCR, CAMPYLOBPCR, SALMONELLAPC, SHIGAPCR, SHIGELLAPCR, MPNEUG, MPNEUM, LACTOQL in the last 72 hours. Pathology:    Radiology Reports:  CT CHEST W CONTRAST   Preliminary Result   1. Patient has mild emphysematous changes and evidence of scarring in the   lungs. 2.  Scattered ground-glass opacities in both lung fields predominantly in the   lower lung zones. This may be on the basis of inflammatory process or   pneumonitis such as COVID-19 infection. 3.  No gross mediastinal adenopathy. 4.  Hepatic steatosis without focal mass lesion. RECOMMENDATIONS:   Correlation for possible COVID-19 infection.          XR CHEST PORTABLE   Final Result   Small bilateral effusions with scattered bilateral pulmonary infiltrates   concerning for pneumonia. XR CHEST PORTABLE   Final Result   *Borderline cardiomegaly with mild perihilar interstitial prominence   suggesting vascular congestion. *No focal airspace consolidation or overt CHF. Pulmonary Function test:    Polysomnogram:    Echocardiogram:   No results found for this or any previous visit. Cardiac Catheterization:   No results found for this or any previous visit. ASSESSMENT AND PLAN     Assessment:    //Bilateral mild pulmonary opacities/pneumonia differential diagnosis would include community-acquired pneumonia secondary to viral, atypical including mycoplasma chlamydia and or bacterial pneumonia  //Acute hypoxic respiratory failure  //Chronic hypoxic respiratory failure  //COPD exacerbation  //Obstructive sleep apnea intolerant/noncompliant with CPAP/BiPAP.  //Obesity  //Diabetes mellitus with hyperglycemia  //Tobacco dependence greater than 40-pack-year smoking.  //Chronic left pleural parenchymal change. Plan:    I personally interviewed/examined the patient; reviewed interval history, interpreted all available radiographic and laboratory data at the time of service. Follow-up on respiratory panel nasopharyngeal swab. Although she had COVID-19 vaccination but advised to check Covid nasopharyngeal swab as she is severely hypoxic and viral-like syndrome clinically and hazy bilateral mild scattered pulmonary infiltrate. Will check Legionella urine antigen. We will check mycoplasma IgM. Currently she is on ceftriaxone okay and doxycycline. If no contraindication recommend levofloxacin on discharge to cover community-acquired/atypical pathogen (allergy to Zithromax)  She is currently on 6 L nasal cannula would recommend to wean O2 to keep saturation 90%. Recommend to use high flow nasal cannula if needed.   Continue nocturnal and as needed CPAP/BiPAP. Continue with DuoNeb aerosol  Continue incentive spirometry, pulmonary toilet, aspiration precautions and bronchodilators  Continue to monitor I/O with a goal of even/negative fluid balance  Antimicrobials reviewed; continue  Continue  IV Solu-Medrol at current dose/prednisone when she improve  DVT prophylaxis  Physical/occupational therapy; increase activity as tolerated. She will need home O2 evaluation on discharge. She will need outpatient sleep study/CPAP titration study  Discussed compliance with oxygen and CPAP  She will need outpatient pulmonary function test.  Complete smoking cessation discussed with her. Advise outpatient follow-up. She will need chest x-ray for follow-up in 6 weeks. Discussed with Primary service Dr Holly Dickey    I updated the patient regarding the current clinical condition, provisional diagnosis and management plan. She verbalized a clear understanding and I addressed her concerns, and answered all questions to the best of my abilities. It was my pleasure to evaluate Jazzy Leo today. We will continue to follow. I would like to thank you for allowing me to participate in the care of this patient. Please feel free to call with any further questions or concerns. Merna Reynoso MD,   Pulmonary and Critical Care Medicine           6/15/2021, 4:30 PM    Please note that this chart was generated using voice recognition Dragon dictation software. Although every effort was made to ensure the accuracy of this automated transcription, some errors in transcription may have occurred.

## 2021-06-15 NOTE — PROGRESS NOTES
Physical Therapy    Facility/Department: [facility] MED SURG ICU  Initial Assessment    NAME: Loc Greene  : 1970  MRN: [id number]    Date of Service: 6/15/2021  Chief Complaint   Patient presents with    Shortness of Breath     2 DAYS OF SHORTNESS OF BREATH AND OUT OF INHALER. PT REPORTS USING DAUGHTERS NEBULIZER MACHINE AND MEDS. HX OF COUGH       Discharge Recommendations:  Patient would benefit from continued therapy after discharge   PT Equipment Recommendations  Equipment Needed: No    Assessment   Body structures, Functions, Activity limitations: Decreased functional mobility ; Decreased strength;Decreased endurance;Decreased balance;Decreased posture  Assessment: Pt exhibits limitations in functional mobility secondary to deficits in endurance and balance. Pt requires SBA for bed mobility and transfers. Pt is able to ambulate a short distance with SBA. Pt exhibited desaturation and increased HR with minimal physical exertion. Pt would be expected to return to prior living situation safely when able to ambulate household distances. Pt would benefit from continued therapy after d/c to improve high-level balance and endurance. Prognosis: Fair  Decision Making: Medium Complexity  PT Education: Goals; General Safety;Gait Training;Plan of Care;PT Role;Transfer Training;Functional Mobility Training  REQUIRES PT FOLLOW UP: Yes  Activity Tolerance  Activity Tolerance: Patient limited by fatigue;Patient limited by endurance  Activity Tolerance: Pt exhibited desaturation with minimal physical exertion. Patient Diagnosis(es): The primary encounter diagnosis was COPD with acute bronchitis ([facility] [address] Diagnoses of Hypoxia and Hypoglycemia were also pertinent to this visit.      has a past medical history of ADD (attention deficit disorder), Arrhythmia, Asthma, Back pain, Bronchitis, Caffeine use, Cholelithiasis, COPD (chronic obstructive pulmonary disease) ([address] Depression, Diabetes mellitus ([address] Diabetes mellitus type 2, noninsulin dependent ([facility] [address] Diarrhea, GERD (gastroesophageal reflux disease), Hyperlipidemia, Kidney infection, Kidney stone, Kidney stones, Pneumonia, and Sleep apnea. has a past surgical history that includes  section ( & ); Cholecystectomy (); Hysterectomy; Colonoscopy (); Cystoscopy; Lithotripsy; Hysterectomy, total abdominal (); Tubal ligation (); Tonsillectomy and adenoidectomy (); and Hand surgery. Restrictions  Restrictions/Precautions  Restrictions/Precautions: Fall Risk  Required Braces or Orthoses?: No  Position Activity Restriction  Other position/activity restrictions: Up as tolerated - 6L O2 via nasal cannula  Vision/Hearing  Vision: Impaired  Vision Exceptions: Wears glasses at all times  Hearing: Within functional limits     Subjective  General  Chart Reviewed: No  Patient assessed for rehabilitation services?: Yes  Family / Caregiver Present: Yes (Friend)  Follows Commands: Within Functional Limits  Subjective  Subjective: Pt and RN agreeable to therapy. Pt reports feeling better than she has in a while. Pain Screening  Patient Currently in Pain: Denies          Orientation  Orientation  Overall Orientation Status: Within Functional Limits  Social/Functional History  Social/Functional History  Lives With: Spouse, Son  Type of Home: House  Home Layout: Two level, Laundry in basement  Home Access: Stairs to enter without rails  Entrance Stairs - Number of Steps: 3 at most  Bathroom Shower/Tub: Tub/Shower unit (pt reports leaning against the back wall during showering.)  Bathroom Toilet: Standard  Bathroom Accessibility: Accessible  ADL Assistance: Independent  Homemaking Assistance: Independent  Homemaking Responsibilities:  (pt reports spouse and self split household responsibilities.)  Ambulation Assistance: Independent  Transfer Assistance: Independent  Active : Yes  Mode of Transportation: Car  Occupation:  On disability  Type of occupation: RN  Leisure & Hobbies: Camping  Additional Comments: Pt reports last fall about a year ago. Pt reports feeling unsteady at times. Cognition   Cognition  Overall Cognitive Status: WFL    Objective  AROM RLE (degrees)  RLE AROM: WFL  AROM LLE (degrees)  LLE AROM : WFL  AROM RUE (degrees)  RUE AROM : WFL  RUE General AROM: Co-eval with OT - see OT evaluation for full UE assessment. AROM LUE (degrees)  LUE AROM : WFL  LUE General AROM: Co-eval with OT - see OT evaluation for full UE assessment. Strength RLE  Strength RLE: WFL  R Hip Flexion: 4/5  R Knee Flexion: 4/5  R Knee Extension: 4+/5  R Ankle Dorsiflexion: 4+/5  R Ankle Plantar flexion: 4+/5  Strength LLE  Strength LLE: WFL  L Hip Flexion: 4+/5  L Knee Flexion: 4+/5  L Knee Extension: 4+/5  L Ankle Dorsiflexion: 4+/5  L Ankle Plantar Flexion: 4+/5  Strength RUE  Strength RUE: WFL  Comment: Co-eval with OT - see OT evaluation for full UE assessment. Strength LUE  Strength LUE: WFL  Comment: Co-eval with OT - see OT evaluation for full UE assessment. Sensation  Overall Sensation Status: WFL (Pt denies any numbness or tingling at this time.)  Bed mobility  Supine to Sit: Stand by assistance  Sit to Supine: Stand by assistance  Scooting: Stand by assistance  Comment: Pt supine in bed upon arrival and retired to bed at end of session. HOB elevated ~40 degrees for bed mobility. Transfers  Sit to Stand: Stand by assistance  Stand to sit: Stand by assistance  Comment: Transfers assessed without AD. Upon standing pt desaturated to 89% and HR increased to 140 bpm. Pt recovered to 91% SpO2 and HR decreased to 117 bpm with seated rest break. Ambulation  Ambulation?: Yes  More Ambulation?: No  Ambulation 1  Surface: level tile  Device: No Device  Other Apparatus: O2 (6L)  Assistance: Stand by assistance  Quality of Gait: increased VIVIANA, decreased step length, slow ludwig  Gait Deviations: Slow Ludwig; Increased VIVIANA; Decreased step length  Distance: 4' x1  Comments: Desaturation to 89% with ambulation to bedside commode. Patient increased SpO2 to 92% once seated on commode. Stairs/Curb  Stairs?: No     Balance  Posture: Fair  Sitting - Static: Fair;+  Sitting - Dynamic: Fair;+  Standing - Static: Fair;-  Standing - Dynamic: Fair;-  Comments: Standing balance assessed without AD. Pt reports she sometimes feels unsteady when standing and has LOB that can be recovered. She believes this unsteadiness is most prominent when she is turning. Pt reports last fall was ~1 year ago. Plan   Plan  Times per week: 5-6x  Times per day: Daily  Current Treatment Recommendations: Strengthening, ROM, Balance Training, Functional Mobility Training, Transfer Training, Stair training, Gait Training, Endurance Training, Neuromuscular Re-education, Safety Education & Training, Home Exercise Program  Safety Devices  Type of devices: Left in bed, Nurse notified, Gait belt, Call light within reach  Restraints  Initially in place: No    AM-PAC Score  AM-PAC Inpatient Mobility Raw Score : 16 (06/15/21 1415)  AM-PAC Inpatient T-Scale Score : 40.78 (06/15/21 1415)  Mobility Inpatient CMS 0-100% Score: 54.16 (06/15/21 1415)  Mobility Inpatient CMS G-Code Modifier : CK (06/15/21 1415)        Goals  Short term goals  Time Frame for Short term goals: 14 visits  Short term goal 1: Pt ambulates 200' independently with self-recognition and initiation of rest breaks and deep breathing are needed to improve mobility. Short term goal 2: Pt navigates 4 stairs independently without rails to facilitate return to prior living situation. Short term goal 3: Pt improves standing balance to good to decrease risk of falls. Short term goal 4: Pt tolerates 30 minutes of therapy to aid endurance with functional mobility.   Patient Goals   Patient goals : Pt would like to return home       Therapy Time   Individual Concurrent Group Co-treatment   Time In 1314         Time Out 1344 Minutes 26         Timed Code Treatment Minutes: 8 Minutes       Lemmie Luis E, SPT  Evaluation/treatment performed by Student PT under the supervision of co-signing PT who agrees with all evaluation/treatment and documentation.

## 2021-06-15 NOTE — PROGRESS NOTES
Occupational Therapy   Occupational Therapy Initial Assessment  Date: 6/15/2021   Patient Name: Grace Romero  MRN: [id number]     : 1970    Date of Service: 6/15/2021  Chief Complaint   Patient presents with    Shortness of Breath     2 DAYS OF SHORTNESS OF BREATH AND OUT OF INHALER. PT REPORTS USING DAUGHTERS NEBULIZER MACHINE AND MEDS. HX OF COUGH       Discharge Recommendations:  Patient would benefit from continued therapy after discharge, Continue to assess pending progress     OT Equipment Recommendations  Equipment Needed: Yes  Mobility Devices: [employer]  Cane: [employer]  ADL Assistive Devices: Shower Chair with back;Grab Bars - shower;Grab Bars - toilet    Assessment   Performance deficits / Impairments: Decreased functional mobility ; Decreased balance;Decreased safe awareness;Decreased ADL status; Decreased high-level IADLs  Assessment: Pt on 6L O2 via nasal cannula at this time - pt reports feeling much better this date. Pt would continue to benefit from skilled OT services during hospitalization and discharge Pt expectant for safe return to prior living with initial services/supervision upon discharge secondary to pt's reduced activity tolerance and balance to perform all functional activities. Prognosis: Good  Decision Making: Medium Complexity  OT Education: OT Role;Plan of Care;Precautions; Equipment  REQUIRES OT FOLLOW UP: Yes  Activity Tolerance  Activity Tolerance: Patient Tolerated treatment well  Activity Tolerance: increased HR noted upon minimal exertion; steadily decline in HR upon return to seated/static position  Safety Devices  Safety Devices in place: Yes  Type of devices: Call light within reach; Left in bed;Nurse notified  Restraints  Initially in place: No           Patient Diagnosis(es): The primary encounter diagnosis was COPD with acute bronchitis ([facility] [address] Diagnoses of Hypoxia and Hypoglycemia were also pertinent to this visit.      has a past medical history of ADD (attention deficit disorder), Arrhythmia, Asthma, Back pain, Bronchitis, Caffeine use, Cholelithiasis, COPD (chronic obstructive pulmonary disease) ([facility] [address] Depression, Diabetes mellitus ([facility] [address] Diabetes mellitus type 2, noninsulin dependent ([facility] [address] Diarrhea, GERD (gastroesophageal reflux disease), Hyperlipidemia, Kidney infection, Kidney stone, Kidney stones, Pneumonia, and Sleep apnea. has a past surgical history that includes  section ( & ); Cholecystectomy (); Hysterectomy; Colonoscopy (); Cystoscopy; Lithotripsy; Hysterectomy, total abdominal (); Tubal ligation (); Tonsillectomy and adenoidectomy (); and Hand surgery.            Restrictions  Restrictions/Precautions  Restrictions/Precautions: Fall Risk  Required Braces or Orthoses?: No  Position Activity Restriction  Other position/activity restrictions: Up as tolerated - 6L O2 via nasal cannula    Subjective   General  Patient assessed for rehabilitation services?: Yes  Family / Caregiver Present: Yes (friend present throughout)  General Comment  Comments: RN ok'd for dian this PM.  Patient Currently in Pain: Denies  Vital Signs  Patient Currently in Pain: Denies     Social/Functional History  Social/Functional History  Lives With: Spouse, Son  Type of Home: House  Home Layout: Two level, Laundry in basement  Home Access: Stairs to enter without rails  Entrance Stairs - Number of Steps: 3 at most  Bathroom Shower/Tub: Tub/Shower unit (pt reports leaning against the back wall during showering.)  Bathroom Toilet: Standard  Bathroom Accessibility: Accessible  ADL Assistance: Independent  Homemaking Assistance: Independent  Homemaking Responsibilities:  (pt reports spouse and self split household responsibilities.)  Ambulation Assistance: Independent  Transfer Assistance: Independent  Active : Yes  Mode of Transportation: Car  Occupation: On disability  Type of occupation: RN  Leisure & Hobbies: Camping  Additional Comments: Pt reports last fall about a year ago. Pt reports feeling unsteady at times. Objective        Orientation  Overall Orientation Status: Within Functional Limits     Balance  Sitting Balance: Stand by assistance (unsupported seated at EOB with SBA during functional screening and during LB ADLs)  Standing Balance: Stand by assistance (during functional mobility to [facility] and during toileting/LB ADLs)    Functional Mobility  Functional - Mobility Device: No device  Activity: Other (from bed ~4 feet to [facility])  Assist Level: Stand by assistance  Functional Mobility Comments: SBA and VCs required secondary to line mgt - pt reports mildly unsteady at times with unknown preciptating events; no true LOB observed this date. Upon functional mobility, pt HR increased to ~140 with steadily declined to ~117 upon return to seated. Toilet Transfers  Toilet - Technique: Ambulating (w/o device)  Equipment Used: Standard bedside commode  Toilet Transfer: Stand by assistance  Toilet Transfers Comments: w/o from bed ~4 feet to [facility] with SBA and Verbal cueing secondary to line mgt    ADL  Feeding: Independent  Grooming: Supervision;Stand by assistance; Increased time to complete  UE Bathing: Contact guard assistance; Increased time to complete  LE Bathing: Contact guard assistance; Increased time to complete  UE Dressing: Contact guard assistance; Increased time to complete  LE Dressing: Stand by assistance;Verbal cueing; Increased time to complete (to don bilateral socks; to thread/don pants and pull underwear up and around waist)  Toileting: Stand by assistance; Increased time to complete (seated for perineal/bottom hygiene)     Tone RUE  RUE Tone: Normotonic  Tone LUE  LUE Tone: Normotonic  Coordination  Movements Are Fluid And Coordinated: Yes     Bed mobility  Supine to Sit: Stand by assistance  Sit to Supine: Stand by assistance  Comment: HOB elevated ~40 degrees.      Transfers  Sit to stand: Stand by assistance  Stand to sit: Stand by assistance     Cognition  Overall Cognitive Status: WFL  Arousal/Alertness: Appropriate responses to stimuli  Following Commands: Follows multistep commands consistently  Attention Span: Appears intact  Memory: Appears intact  Safety Judgement: Decreased awareness of need for assistance;Decreased awareness of need for safety  Problem Solving: Assistance required to identify errors made;Assistance required to correct errors made  Insights: Decreased awareness of deficits  Initiation: Does not require cues  Sequencing: Does not require cues        Sensation  Overall Sensation Status: WFL (Pt denies any numbness or tingling at this time.)        LUE AROM (degrees)  LUE AROM : WFL  RUE AROM (degrees)  RUE AROM : WFL    LUE Strength  Gross LUE Strength: WFL  LUE Strength Comment: Grossly 4+/5 throughout  RUE Strength  RUE Strength Comment: Grossly 4+/5 throughout           Plan   Plan  Times per week: 5-6x/week  Times per day: Daily  Current Treatment Recommendations: Safety Education & Training, Balance Training, Patient/Caregiver Education & Training, Self-Care / ADL, Functional Mobility Training, Endurance Training, Equipment Evaluation, Education, & procurement, Home Management Training      [facility] Score   [facility] Daily Activity Raw Score: 19 (06/15/21 [facility])  AM-PAC Inpatient ADL T-Scale Score : 40.22 (06/15/21 [facility])  ADL Inpatient CMS 0-100% Score: 42.8 (06/15/21 [facility])  ADL Inpatient CMS G-Code Modifier : CK (06/15/21 [facility])    Goals  Short term goals  Time Frame for Short term goals: 10 visits  Short term goal 1: Pt will demo Mod IND, with DME as needed, to perform grooming/UB ADLs. Short term goal 2: Pt will demo Mod IND, with DME as needed, to perform toileting/LB ADLs. Short term goal 3: Pt will demo Mod IND, with LRD, to perform functional mobility/transfers during ADLs.   Short term goal 4: Pt will tolerate 10+ minutes of dynamic standing balance during a functional tasks for increased participation in ADLs. Short term goal 5: Pt will tolerate 15+ minutes of activity tolerance to increase engagement in ADLs/IADLs and functional mobility. Short term goal 6: Pt will demo good safety awareness throughout participation in functional tasks, 100% of the time, for increased engagement during ADLs and functional mobility/transfers.        Therapy Time   Individual Concurrent Group Co-treatment   Time In 1314         Time Out 1339         Minutes 25         Timed Code Treatment Minutes: 8 Minutes       REKHA Clark/L

## 2021-06-15 NOTE — PROGRESS NOTES
RN updated home med list. RN messaged Jose A Casas NP oncall with updates. Chest x-ray showing possible vascular congestion. Crackles to bilateral bases of lungs. And a liter bolus of saline given in the ED. Lab work reviewed. RN asking if patient needs continuous IV fluids. NP has RN hold continuous IV fluids for tonight. Second lactic acid cancelled.

## 2021-06-16 LAB
ANION GAP SERPL CALCULATED.3IONS-SCNC: 11 MMOL/L (ref 9–17)
BUN BLDV-MCNC: 20 MG/DL (ref 6–20)
BUN/CREAT BLD: ABNORMAL (ref 9–20)
CALCIUM SERPL-MCNC: 9.7 MG/DL (ref 8.6–10.4)
CHLORIDE BLD-SCNC: 100 MMOL/L (ref 98–107)
CO2: 27 MMOL/L (ref 20–31)
CREAT SERPL-MCNC: 0.67 MG/DL (ref 0.5–0.9)
CULTURE: NO GROWTH
EKG ATRIAL RATE: 105 BPM
EKG P AXIS: 40 DEGREES
EKG P-R INTERVAL: 134 MS
EKG Q-T INTERVAL: 382 MS
EKG QRS DURATION: 82 MS
EKG QTC CALCULATION (BAZETT): 504 MS
EKG R AXIS: 55 DEGREES
EKG T AXIS: 73 DEGREES
EKG VENTRICULAR RATE: 105 BPM
GFR AFRICAN AMERICAN: >60 ML/MIN
GFR NON-AFRICAN AMERICAN: >60 ML/MIN
GFR SERPL CREATININE-BSD FRML MDRD: ABNORMAL ML/MIN/{1.73_M2}
GFR SERPL CREATININE-BSD FRML MDRD: ABNORMAL ML/MIN/{1.73_M2}
GLUCOSE BLD-MCNC: 330 MG/DL (ref 65–105)
GLUCOSE BLD-MCNC: 334 MG/DL (ref 65–105)
GLUCOSE BLD-MCNC: 337 MG/DL (ref 65–105)
GLUCOSE BLD-MCNC: 355 MG/DL (ref 65–105)
GLUCOSE BLD-MCNC: 386 MG/DL (ref 70–99)
HCT VFR BLD CALC: 45 % (ref 36–46)
HEMOGLOBIN: 14.9 G/DL (ref 12–16)
LEGIONELLA PNEUMOPHILIA AG, URINE: NEGATIVE
Lab: NORMAL
MAGNESIUM: 2.2 MG/DL (ref 1.6–2.6)
MCH RBC QN AUTO: 30.3 PG (ref 26–34)
MCHC RBC AUTO-ENTMCNC: 33.1 G/DL (ref 31–37)
MCV RBC AUTO: 91.5 FL (ref 80–100)
MYCOPLASMA PNEUMONIAE IGM: 0.33
NRBC AUTOMATED: ABNORMAL PER 100 WBC
PDW BLD-RTO: 13.3 % (ref 12.5–15.4)
PLATELET # BLD: 360 K/UL (ref 140–450)
PMV BLD AUTO: 6.8 FL (ref 6–12)
POTASSIUM SERPL-SCNC: 4.1 MMOL/L (ref 3.7–5.3)
RBC # BLD: 4.92 M/UL (ref 4–5.2)
SODIUM BLD-SCNC: 138 MMOL/L (ref 135–144)
SPECIMEN DESCRIPTION: NORMAL
WBC # BLD: 21.7 K/UL (ref 3.5–11)

## 2021-06-16 PROCEDURE — 94667 MNPJ CHEST WALL 1ST: CPT

## 2021-06-16 PROCEDURE — 99232 SBSQ HOSP IP/OBS MODERATE 35: CPT | Performed by: STUDENT IN AN ORGANIZED HEALTH CARE EDUCATION/TRAINING PROGRAM

## 2021-06-16 PROCEDURE — 2060000000 HC ICU INTERMEDIATE R&B

## 2021-06-16 PROCEDURE — 80048 BASIC METABOLIC PNL TOTAL CA: CPT

## 2021-06-16 PROCEDURE — 83735 ASSAY OF MAGNESIUM: CPT

## 2021-06-16 PROCEDURE — 94761 N-INVAS EAR/PLS OXIMETRY MLT: CPT

## 2021-06-16 PROCEDURE — 94660 CPAP INITIATION&MGMT: CPT

## 2021-06-16 PROCEDURE — 97535 SELF CARE MNGMENT TRAINING: CPT

## 2021-06-16 PROCEDURE — 6360000002 HC RX W HCPCS: Performed by: NURSE PRACTITIONER

## 2021-06-16 PROCEDURE — 6370000000 HC RX 637 (ALT 250 FOR IP): Performed by: NURSE PRACTITIONER

## 2021-06-16 PROCEDURE — 94668 MNPJ CHEST WALL SBSQ: CPT

## 2021-06-16 PROCEDURE — 6360000002 HC RX W HCPCS: Performed by: INTERNAL MEDICINE

## 2021-06-16 PROCEDURE — 36415 COLL VENOUS BLD VENIPUNCTURE: CPT

## 2021-06-16 PROCEDURE — 6370000000 HC RX 637 (ALT 250 FOR IP): Performed by: STUDENT IN AN ORGANIZED HEALTH CARE EDUCATION/TRAINING PROGRAM

## 2021-06-16 PROCEDURE — 97116 GAIT TRAINING THERAPY: CPT

## 2021-06-16 PROCEDURE — APPSS45 APP SPLIT SHARED TIME 31-45 MINUTES: Performed by: NURSE PRACTITIONER

## 2021-06-16 PROCEDURE — 97112 NEUROMUSCULAR REEDUCATION: CPT

## 2021-06-16 PROCEDURE — 94664 DEMO&/EVAL PT USE INHALER: CPT

## 2021-06-16 PROCEDURE — 99233 SBSQ HOSP IP/OBS HIGH 50: CPT | Performed by: INTERNAL MEDICINE

## 2021-06-16 PROCEDURE — 94640 AIRWAY INHALATION TREATMENT: CPT

## 2021-06-16 PROCEDURE — 82947 ASSAY GLUCOSE BLOOD QUANT: CPT

## 2021-06-16 PROCEDURE — 2580000003 HC RX 258: Performed by: NURSE PRACTITIONER

## 2021-06-16 PROCEDURE — 2700000000 HC OXYGEN THERAPY PER DAY

## 2021-06-16 PROCEDURE — 85027 COMPLETE CBC AUTOMATED: CPT

## 2021-06-16 RX ORDER — LOSARTAN POTASSIUM 25 MG/1
25 TABLET ORAL DAILY
Status: DISCONTINUED | OUTPATIENT
Start: 2021-06-17 | End: 2021-06-17 | Stop reason: HOSPADM

## 2021-06-16 RX ORDER — BUDESONIDE AND FORMOTEROL FUMARATE DIHYDRATE 160; 4.5 UG/1; UG/1
2 AEROSOL RESPIRATORY (INHALATION) 2 TIMES DAILY
Status: DISCONTINUED | OUTPATIENT
Start: 2021-06-16 | End: 2021-06-17 | Stop reason: HOSPADM

## 2021-06-16 RX ORDER — FLUCONAZOLE 100 MG/1
150 TABLET ORAL ONCE
Status: COMPLETED | OUTPATIENT
Start: 2021-06-16 | End: 2021-06-16

## 2021-06-16 RX ORDER — METHYLPREDNISOLONE SODIUM SUCCINATE 40 MG/ML
40 INJECTION, POWDER, LYOPHILIZED, FOR SOLUTION INTRAMUSCULAR; INTRAVENOUS EVERY 12 HOURS
Status: DISCONTINUED | OUTPATIENT
Start: 2021-06-16 | End: 2021-06-17

## 2021-06-16 RX ORDER — INSULIN GLARGINE 100 [IU]/ML
20 INJECTION, SOLUTION SUBCUTANEOUS EVERY MORNING
Status: DISCONTINUED | OUTPATIENT
Start: 2021-06-16 | End: 2021-06-17

## 2021-06-16 RX ORDER — ATORVASTATIN CALCIUM 40 MG/1
40 TABLET, FILM COATED ORAL DAILY
Status: DISCONTINUED | OUTPATIENT
Start: 2021-06-17 | End: 2021-06-17 | Stop reason: HOSPADM

## 2021-06-16 RX ORDER — GUAIFENESIN 600 MG/1
600 TABLET, EXTENDED RELEASE ORAL 2 TIMES DAILY
Status: DISCONTINUED | OUTPATIENT
Start: 2021-06-16 | End: 2021-06-17 | Stop reason: HOSPADM

## 2021-06-16 RX ORDER — BENZONATATE 100 MG/1
100 CAPSULE ORAL 3 TIMES DAILY PRN
Status: DISCONTINUED | OUTPATIENT
Start: 2021-06-16 | End: 2021-06-17 | Stop reason: HOSPADM

## 2021-06-16 RX ORDER — ASPIRIN 81 MG/1
81 TABLET, CHEWABLE ORAL DAILY
Status: DISCONTINUED | OUTPATIENT
Start: 2021-06-16 | End: 2021-06-17 | Stop reason: HOSPADM

## 2021-06-16 RX ADMIN — INSULIN LISPRO 4 UNITS: 100 INJECTION, SOLUTION INTRAVENOUS; SUBCUTANEOUS at 21:47

## 2021-06-16 RX ADMIN — TRAZODONE HYDROCHLORIDE 100 MG: 50 TABLET ORAL at 21:47

## 2021-06-16 RX ADMIN — METHYLPREDNISOLONE SODIUM SUCCINATE 40 MG: 40 INJECTION, POWDER, FOR SOLUTION INTRAMUSCULAR; INTRAVENOUS at 01:49

## 2021-06-16 RX ADMIN — CLONAZEPAM 1 MG: 1 TABLET ORAL at 21:47

## 2021-06-16 RX ADMIN — FLUCONAZOLE 150 MG: 100 TABLET ORAL at 10:22

## 2021-06-16 RX ADMIN — IPRATROPIUM BROMIDE AND ALBUTEROL SULFATE 1 AMPULE: .5; 3 SOLUTION RESPIRATORY (INHALATION) at 08:45

## 2021-06-16 RX ADMIN — ASPIRIN 81 MG: 81 TABLET, CHEWABLE ORAL at 21:46

## 2021-06-16 RX ADMIN — INSULIN LISPRO 10 UNITS: 100 INJECTION, SOLUTION INTRAVENOUS; SUBCUTANEOUS at 17:12

## 2021-06-16 RX ADMIN — INSULIN LISPRO 10 UNITS: 100 INJECTION, SOLUTION INTRAVENOUS; SUBCUTANEOUS at 17:06

## 2021-06-16 RX ADMIN — GUAIFENESIN 600 MG: 600 TABLET, EXTENDED RELEASE ORAL at 10:23

## 2021-06-16 RX ADMIN — BUDESONIDE AND FORMOTEROL FUMARATE DIHYDRATE 2 PUFF: 160; 4.5 AEROSOL RESPIRATORY (INHALATION) at 19:55

## 2021-06-16 RX ADMIN — INSULIN GLARGINE 30 UNITS: 100 INJECTION, SOLUTION SUBCUTANEOUS at 17:07

## 2021-06-16 RX ADMIN — INSULIN LISPRO 10 UNITS: 100 INJECTION, SOLUTION INTRAVENOUS; SUBCUTANEOUS at 09:27

## 2021-06-16 RX ADMIN — METHYLPREDNISOLONE SODIUM SUCCINATE 40 MG: 40 INJECTION, POWDER, FOR SOLUTION INTRAMUSCULAR; INTRAVENOUS at 06:03

## 2021-06-16 RX ADMIN — INSULIN LISPRO 7 UNITS: 100 INJECTION, SOLUTION INTRAVENOUS; SUBCUTANEOUS at 17:16

## 2021-06-16 RX ADMIN — SODIUM CHLORIDE, PRESERVATIVE FREE 20 ML: 5 INJECTION INTRAVENOUS at 21:51

## 2021-06-16 RX ADMIN — PANTOPRAZOLE SODIUM 40 MG: 40 TABLET, DELAYED RELEASE ORAL at 06:04

## 2021-06-16 RX ADMIN — INSULIN LISPRO 7 UNITS: 100 INJECTION, SOLUTION INTRAVENOUS; SUBCUTANEOUS at 13:29

## 2021-06-16 RX ADMIN — CEFTRIAXONE SODIUM 1000 MG: 1 INJECTION, POWDER, FOR SOLUTION INTRAMUSCULAR; INTRAVENOUS at 17:10

## 2021-06-16 RX ADMIN — INSULIN GLARGINE 20 UNITS: 100 INJECTION, SOLUTION SUBCUTANEOUS at 09:27

## 2021-06-16 RX ADMIN — IPRATROPIUM BROMIDE AND ALBUTEROL SULFATE 1 AMPULE: .5; 3 SOLUTION RESPIRATORY (INHALATION) at 16:20

## 2021-06-16 RX ADMIN — IPRATROPIUM BROMIDE AND ALBUTEROL SULFATE 1 AMPULE: .5; 3 SOLUTION RESPIRATORY (INHALATION) at 12:43

## 2021-06-16 RX ADMIN — DOXYCYCLINE HYCLATE 100 MG: 100 TABLET, COATED ORAL at 21:47

## 2021-06-16 RX ADMIN — DOXYCYCLINE HYCLATE 100 MG: 100 TABLET, COATED ORAL at 09:26

## 2021-06-16 RX ADMIN — ENOXAPARIN SODIUM 40 MG: 40 INJECTION SUBCUTANEOUS at 09:26

## 2021-06-16 RX ADMIN — GUAIFENESIN 600 MG: 600 TABLET, EXTENDED RELEASE ORAL at 21:47

## 2021-06-16 RX ADMIN — IPRATROPIUM BROMIDE AND ALBUTEROL SULFATE 1 AMPULE: .5; 3 SOLUTION RESPIRATORY (INHALATION) at 19:55

## 2021-06-16 RX ADMIN — ATORVASTATIN CALCIUM 10 MG: 10 TABLET, FILM COATED ORAL at 09:24

## 2021-06-16 RX ADMIN — METHYLPREDNISOLONE SODIUM SUCCINATE 40 MG: 40 INJECTION, POWDER, FOR SOLUTION INTRAMUSCULAR; INTRAVENOUS at 17:10

## 2021-06-16 ASSESSMENT — ENCOUNTER SYMPTOMS
NAUSEA: 0
CHOKING: 0
WHEEZING: 0
VOICE CHANGE: 0
BLOOD IN STOOL: 0
BACK PAIN: 0
PHOTOPHOBIA: 0
SHORTNESS OF BREATH: 0
EYE DISCHARGE: 0
SINUS PRESSURE: 1
EYE REDNESS: 0
ABDOMINAL PAIN: 0
SHORTNESS OF BREATH: 1
COLOR CHANGE: 0
COUGH: 1
DIARRHEA: 0
SORE THROAT: 0
WHEEZING: 1
CHEST TIGHTNESS: 0
CONSTIPATION: 0
ALLERGIC/IMMUNOLOGIC NEGATIVE: 1
TROUBLE SWALLOWING: 0
VOMITING: 0

## 2021-06-16 ASSESSMENT — PAIN DESCRIPTION - PROGRESSION: CLINICAL_PROGRESSION: NOT CHANGED

## 2021-06-16 ASSESSMENT — PAIN SCALES - GENERAL
PAINLEVEL_OUTOF10: 0
PAINLEVEL_OUTOF10: 0

## 2021-06-16 NOTE — PROGRESS NOTES
Attending Supervising Physician's Attestation Statement  I performed a history and physical examination on the patient and discussed the management with the nurse practitioner. I reviewed and agree with the findings and plan as documented in her note . Physical Exam  Constitutional:       General: She is not in acute distress. Appearance: She is obese. HENT:      Head: Normocephalic and atraumatic. Nose:      Comments: Nasal cannula     Mouth/Throat:      Mouth: Mucous membranes are moist.   Eyes:      Extraocular Movements: Extraocular movements intact. Pupils: Pupils are equal, round, and reactive to light. Cardiovascular:      Rate and Rhythm: Normal rate and regular rhythm. Pulses: Normal pulses. Heart sounds: No murmur heard. Pulmonary:      Effort: Prolonged expiration and respiratory distress present. Breath sounds: Decreased air movement present. No stridor. Decreased breath sounds and wheezing present. No rhonchi. Abdominal:      General: Bowel sounds are normal.      Palpations: Abdomen is soft. Musculoskeletal:         General: No swelling. Normal range of motion. Cervical back: Normal range of motion. Skin:     General: Skin is warm and dry. Capillary Refill: Capillary refill takes less than 2 seconds. Neurological:      Mental Status: She is alert and oriented to person, place, and time. Mental status is at baseline. Psychiatric:         Mood and Affect: Mood normal.           COPD exacerbation, weaning down to now 3L nasal cannula, still having profound wheezing. Conintue solumedrol today, discussed diabetes and patient's cardiovascular and stroke risk due to non compliance, was receptive and wants to do better. ASVD risk score 11.7% start aspirin and statin.        Electronically signed by Hang Hernandez MD on 6/16/21 at 2:00 PM EDT

## 2021-06-16 NOTE — PLAN OF CARE
Problem: Discharge Planning:  Goal: Participates in care planning  Description: Participates in care planning  6/16/2021 0021 by Cricket Martin RN  Outcome: Ongoing   Pt. Actively participates in plan of care & ADL's  Problem: Airway Clearance - Ineffective:  Goal: Ability to maintain a clear airway will improve  Description: Ability to maintain a clear airway will improve  6/16/2021 0021 by Cricket Martin RN  Outcome: Ongoing   O2 sats remain >90% this shift on O2 per NC or CPAP  Problem: Tobacco Use:  Goal: Will participate in inpatient tobacco-use cessation counseling  Description: Will participate in inpatient tobacco-use cessation counseling  6/16/2021 0021 by Cricket Martin RN  Outcome: Ongoing   Nicotine patch in place  Problem: Activity Intolerance:  Goal: Ability to tolerate increased activity will improve  Description: Ability to tolerate increased activity will improve  6/16/2021 0021 by Cricket Martin RN  Outcome: Ongoing   Pt. Dyspneic upon exertion  Problem: Breathing Pattern - Ineffective:  Goal: Ability to achieve and maintain a regular respiratory rate will improve  Description: Ability to achieve and maintain a regular respiratory rate will improve  6/16/2021 0021 by Cricket Martin RN  Outcome: Ongoing   Respiratory rate improvement noted this shift, pt.  Agreeable to wearing O2 per NC &/or CPAP   Problem: Falls - Risk of:  Goal: Will remain free from falls  Description: Will remain free from falls  6/16/2021 0021 by Cricket Martin RN  Outcome: Ongoing   No falls/injuries this shift, bed in lowest position, brakes on, bed alarm on, call light in reach, side rails up x2

## 2021-06-16 NOTE — PROGRESS NOTES
[facility]  Office: [address], DO, Javier Cease, DO, Ian Carlson, DO, Chavez Juráez, DO, Sincere Valencia MD, Alfie Colindres MD, John Bose MD, Be Mosher MD, Liberty Barrera MD, Kailee Major MD, Raquel Mata MD, Alicia Marin MD, Humphrey Sue, DO, Светлана Toussaint MD, Annmarie Pacheco, DO, Karen Syed MD,  Ever Jefferson DO, Saul Abreu MD, Paola Rowe MD, Graham Burnham MD, Katlyn Galdamez MD, Kirt Trevino, Nadine Collins, CNP, Darrion Tello, CNP, Benjamin Crews, CNS, Anastasia Claros, CNP, Delia Graham, CNP, Christie Delgado, CNP, Ezequiel Esteves, CNP, Nathen Blackwell, CNP, Luis Miguel Moreau, PA-C, Oneyda Hoyt, [facility], Harrison Gordon, CNP, Carolina Pedraza, CNP, Reta Sharpe, CNP, Jackie Dos Santos, CNP, Cade Dawson, CNP, Chari Dumont [id number]    Progress Note    6/16/2021    9:41 AM    Name:   Sergio Hassan  MRN:     [id number]     Acct:      [de-identified]   Room:   [address] [place] [address] Day:  2  Admit Date:  6/14/2021  4:59 PM    PCP:   Steffi Graff MD  Code Status:  Full Code    Subjective:     C/C:   Chief Complaint   Patient presents with    Shortness of Breath     2 DAYS OF SHORTNESS OF BREATH AND OUT OF INHALER. PT REPORTS USING DAUGHTERS NEBULIZER MACHINE AND MEDS. HX OF COUGH     Interval History Status: improved. Much better today. She is currently on 5 L nasal cannula. No shortness of breath during conversation. She said she was up this morning and got cleaned up at the sink and did not feel fatigued from that. I encouraged her to try ambulating in the espinoza later today and she is agreeable and says she actually like take shower later today. She has a dry nonproductive cough but no fever or chills. No problems with bowel or bladder. Plan to add Mucinex and Tessalon Perles. Additional dose of Lantus ordered to support blood glucoses while receiving steroids.     Rapid Covid negative  Urine Legionella antigen is negative  respiratory panel is positive for rhino/enterovirus. Mycoplasma IgM pending    Brief History:     Braxton Lua is a 44 y.o. Unavailable/unknown female who presents with Shortness of Breath (2 DAYS OF SHORTNESS OF BREATH AND OUT OF INHALER. PT REPORTS USING DAUGHTERS NEBULIZER MACHINE AND MEDS. HX OF COUGH)   and is admitted to the hospital for the management of Acute exacerbation of chronic obstructive pulmonary disease ([facility] [address]    Patient states her symptoms started a couple of days ago. She says initially she had nasal congestion, and earache and sore throat with chills and diaphoresis and a dry cough. She says yesterday she developed shortness of breath earlier in the day and then later felt like she could not breathe. She says yesterday she stayed with her daughter to use her daughter's home nebulizer because her meds have run out. She does report a history of ATUL and COPD but she is not compliant with her medications or her CPAP. She also has a history of diabetes but says her diabetes is under control so she stopped taking her medications until April when she had a hemoglobin A1c of 10.2. She says at that time she was started back on diabetic medications.     A1c's available in my chart  10. 2High  7.3High  9.7High  9.6High  9.1High       Her urine is showing few bacteria and trace hemoglobin with positive nitrates. She states she often has UTIs without urinary symptoms.     Per the ER doctor her vitals initially showed tachycardia, tachypnea and hypoxia and she appeared uncomfortable (pulse ox was 70% on room air upon arrival to the ER) patient received breathing treatments, Solu-Medrol and magnesium. Per the ER documents her symptoms did somewhat improve.     Patient denies a history of home O2 use.   For any documentation she was initially placed on 3 L and then it was increased to 6 L nasal cannula and ultimately she was placed on BiPAP overnight and 35 to 40% FiO2. During my assessment she is on 6 L nasal cannula. She is able to carry on a conversation with me without becoming profoundly tachypneic or hypoxic but when she is up to the bathroom her sats do drop into the 80s. Plan to change her prednisone p.o. to Solu-Medrol, continue duo nebs as ordered and consult pulmonary medicine. Continue Rocephin to cover for UTI  Continue doxycycline for possible pneumonia  Lasix 40 mg IV x1. Patient has crackles on assessment     Pulmonary following. Per their recommendation  Continue IV Solu-Medrol but decrease to 40 mg every 12 hours and then transition to prednisone taper dose. He suggests pulmonary follow-up for pulmonary function tests in approximately 6 weeks smoking cessation and outpatient sleep/CPAP titration study      Review of Systems:     Review of Systems   Constitutional: Negative for chills, diaphoresis and fever. HENT: Negative for congestion. Eyes: Negative for visual disturbance. Respiratory: Positive for cough. Negative for chest tightness, shortness of breath and wheezing. Cardiovascular: Negative for chest pain, palpitations and leg swelling. Gastrointestinal: Negative for abdominal pain, blood in stool, constipation, diarrhea, nausea and vomiting. Genitourinary: Negative for difficulty urinating. Neurological: Negative for dizziness, weakness, light-headedness, numbness and headaches. All other systems reviewed and are negative. Medications: Allergies:     Allergies   Allergen Reactions    Zithromax [Azithromycin] Hives       Current Meds:   Scheduled Meds:    insulin glargine  20 Units Subcutaneous QAM    methylPREDNISolone  40 mg Intravenous Q12H    fluconazole  150 mg Oral Once    pantoprazole  40 mg Oral QAM AC    traZODone  100 mg Oral Nightly    atorvastatin  10 mg Oral Daily    insulin lispro  0-12 Units Subcutaneous TID WC    insulin lispro  0-6 Units Subcutaneous Nightly    sodium chloride flush  5-40 mL Intravenous 2 times per day    enoxaparin  40 mg Subcutaneous Daily    ipratropium-albuterol  1 ampule Inhalation Q4H WA    doxycycline hyclate  100 mg Oral Q12H    cefTRIAXone (ROCEPHIN) IV  1,000 mg Intravenous Q24H    insulin glargine  30 Units Subcutaneous QPM    amLODIPine  5 mg Oral Nightly    nicotine  1 patch Transdermal Daily     Continuous Infusions:    dextrose      sodium chloride       PRN Meds: sodium chloride flush, ALPRAZolam, clonazePAM, glucose, dextrose, glucagon (rDNA), dextrose, sodium chloride flush, sodium chloride, ondansetron **OR** ondansetron, polyethylene glycol, acetaminophen **OR** acetaminophen, albuterol    Data:     Past Medical History:   has a past medical history of ADD (attention deficit disorder), Arrhythmia, Asthma, Back pain, Bronchitis, Caffeine use, Cholelithiasis, COPD (chronic obstructive pulmonary disease) ([facility] [address] Depression, Diabetes mellitus ([facility] [address] Diabetes mellitus type 2, noninsulin dependent ([facility] [address] Diarrhea, GERD (gastroesophageal reflux disease), Hyperlipidemia, Kidney infection, Kidney stone, Kidney stones, Pneumonia, and Sleep apnea. Social History:   reports that she has been smoking. She has a 35.00 pack-year smoking history. She has never used smokeless tobacco. She reports current alcohol use. She reports current drug use. Drug: Marijuana.      Family History:   Family History   Problem Relation Age of Onset    No Known Problems Mother     No Known Problems Father     Heart Disease Maternal Grandmother     Diabetes Maternal Grandmother     Heart Disease Maternal Grandfather     Diabetes Maternal Grandfather        Vitals:  /79   Pulse 58   Temp 97.7 °F (36.5 °C) (Oral)   Resp 16   Ht 5' 5\" (1.651 m)   Wt 230 lb 9.6 oz (104.6 kg)   SpO2 91%   BMI 38.37 kg/m²   Temp (24hrs), Av.9 °F (36.6 °C), Min:97.7 °F (36.5 °C), Max:98.1 °F (36.7 °C)    Recent Labs     06/15/21  1141 06/15/21  1707 06/15/21  2028 06/16/21  0755   POCGLU 228* 458* 313* 355*       I/O (24Hr):     Intake/Output Summary (Last 24 hours) at 6/16/2021 0941  Last data filed at 6/16/2021 0753  Gross per 24 hour   Intake 640 ml   Output 1250 ml   Net -610 ml       Labs:  Hematology:  Recent Labs     06/14/21  1713 06/15/21  0537 06/16/21  0907   WBC 15.9* 14.6* 21.7*   RBC 5.03 4.65 4.92   HGB 15.0 14.0 14.9   HCT 45.4 42.7 45.0   MCV 90.1 91.7 91.5   MCH 29.9 30.1 30.3   MCHC 33.1 32.8 33.1   RDW 12.9 13.1 13.3    309 360   MPV 6.6 6.5 6.8   CRP  --  16.0*  --    DDIMER 0.46  --   --      Chemistry:  Recent Labs     06/14/21  1713 06/14/21  2224 06/15/21  0537 06/16/21  0907     --  136 138   K 3.8  --  4.6 4.1   CL 99  --  104 100   CO2 24  --  25 27   GLUCOSE 245*  --  301* 386*   BUN 9  --  11 20   CREATININE 0.49*  --  0.47* 0.67   MG 1.8  --   --  2.2   ANIONGAP 14  --  7* 11   LABGLOM >60  --  >60 >60   GFRAA >60  --  >60 >60   CALCIUM 9.6  --  9.1 9.7   PROBNP  --  390*  --   --    TROPHS 10  --   --   --      Recent Labs     06/14/21 1713 06/14/21  2143 06/15/21  0537 06/15/21  0743 06/15/21  1141 06/15/21  1707 06/15/21  2028 06/16/21  0755   PROT 8.2  --   --   --   --   --   --   --    LABALBU 4.3  --   --   --   --   --   --   --    LABA1C 9.8*  --   --   --   --   --   --   --    AST 10  --   --   --   --   --   --   --    ALT 9  --   --   --   --   --   --   --    LDH  --   --  268*  --   --   --   --   --    ALKPHOS 133*  --   --   --   --   --   --   --    BILITOT 0.94  --   --   --   --   --   --   --    POCGLU  --  309*  --  281* 228* 458* 313* 355*     ABG:No results found for: POCPH, PHART, PH, POCPCO2, GLE1LQE, PCO2, POCPO2, PO2ART, PO2, POCHCO3, IEZ9FVH, HCO3, NBEA, PBEA, BEART, BE, THGBART, THB, XHV2VLP, MJZT2SER, X8KDGZHG, O2SAT, FIO2  Lab Results   Component Value Date/Time    SPECIAL 20ML LEFT ARM 05/07/2021 01:48 PM     Lab Results   Component Value Date/Time    CULTURE NO GROWTH 6 DAYS 05/07/2021 01:48 PM Radiology:  CT CHEST W CONTRAST    Result Date: 6/15/2021  1. Patient has mild emphysematous changes and evidence of scarring in the lungs. 2.  Scattered ground-glass opacities in both lung fields predominantly in the lower lung zones. This may be on the basis of inflammatory process or pneumonitis such as COVID-19 infection. 3.  No gross mediastinal adenopathy. 4.  Hepatic steatosis without focal mass lesion. RECOMMENDATIONS: Correlation for possible COVID-19 infection. XR CHEST PORTABLE    Result Date: 6/15/2021  Small bilateral effusions with scattered bilateral pulmonary infiltrates concerning for pneumonia. XR CHEST PORTABLE    Result Date: 6/14/2021  *Borderline cardiomegaly with mild perihilar interstitial prominence suggesting vascular congestion. *No focal airspace consolidation or overt CHF. Physical Examination:     Physical Exam  Vitals and nursing note reviewed. Constitutional:       Appearance: Normal appearance. HENT:      Mouth/Throat:      Mouth: Mucous membranes are moist.   Eyes:      Extraocular Movements: Extraocular movements intact. Cardiovascular:      Rate and Rhythm: Normal rate and regular rhythm. Pulses: Normal pulses. Heart sounds: Normal heart sounds. Pulmonary:      Effort: Pulmonary effort is normal.      Breath sounds: Examination of the right-upper field reveals wheezing. Examination of the left-upper field reveals wheezing. Examination of the right-middle field reveals wheezing. Examination of the left-middle field reveals wheezing. Examination of the right-lower field reveals wheezing. Examination of the left-lower field reveals wheezing. Wheezing present. Abdominal:      General: Bowel sounds are normal.      Palpations: Abdomen is soft. Musculoskeletal:         General: Normal range of motion. Cervical back: Neck supple. Right lower leg: No edema. Left lower leg: No edema. Skin:     General: Skin is warm and dry. Capillary Refill: Capillary refill takes less than 2 seconds. Neurological:      Mental Status: She is alert and oriented to person, place, and time. Psychiatric:         Mood and Affect: Mood normal.         Assessment:     Hospital Problems         Last Modified POA    * (Principal) Acute hypoxemic respiratory failure ([address] 6/15/2021 Yes    Acute exacerbation of chronic obstructive pulmonary disease ([address] 6/15/2021 Yes    Hypoxia 6/15/2021 Yes    Bronchitis 6/15/2021 Yes    Type 2 diabetes mellitus with hyperglycemia, with long-term current use of insulin ([address] 6/15/2021 Yes    Obesity (BMI 30-39.9) 6/15/2021 Yes    Tobacco abuse 6/15/2021 Yes    ATUL (obstructive sleep apnea) 6/15/2021 Yes    Overview Signed 6/15/2021 10:59 AM by Felipa Reed APRN - CNP     Not compliant           COPD with acute bronchitis ([address] 6/15/2021 Yes    Hypoglycemia 6/15/2021 Yes          Plan:     Acute COPD exacerbation/hypoxia; pulmonary following oxygen to maintain sat greater than 90%, respiratory treatments as ordered, Solu-Medrol, BiPAP as needed. Decreasing Solu-Medrol to twice daily. patient encouraged to follow-up outpatient for new sleep study with compliance. Respiratory culture needed.      Obesity; weight management and lifestyle modification per PCP outpatient     Tobacco abuse; nicotine patch. Encourage smoking cessation     Type 2 diabetes with hyperglycemia; use insulin sliding scale to cover glucose before meals and at bedtime, Lantus increased to twice daily. Diabetic diet. A1c 9.8.   Encourage  diabetic compliance and follow-up with diabetic educator outpatient     DVT and GI prophylaxis     Resume home Norvasc, Lipitor, as needed Xanax and Louis Mohs, APRN - CNP  6/16/2021  9:41 AM

## 2021-06-16 NOTE — CARE COORDINATION
[facility] ICU Quality Flow/Interdisciplinary Rounds Progress Note    Quality Flow Rounds held on June 16, 2021 at [address] Attending:  Bedside Nurse, , Nursing Unit Leadership, Dietary, Respiratory Therapy, Physical Therapy, Occupational Therapy and Spiritual Care/    Anticipated Discharge Date:  Expected Discharge Date: 06/15/21    Anticipated Discharge Disposition: home    Readmission Risk              Risk of Unplanned Readmission:  13           Discussed patient goal for the day, patient clinical progression, and barriers to discharge.   The following Goal(s) of the Day/Commitment(s) have been identified:  pulmonary support - O2, CPAP, aerolized medications; IV steriods, antibiotics - CM follows for home care needs - home O2 and nebulizer      Bhanu Harrell RN  June 16, 2021

## 2021-06-16 NOTE — PROGRESS NOTES
Physical Therapy  Facility/Department: [facility]  Daily Treatment Note  NAME: Vicky Schmid  : 1970  MRN: [id number]    Date of Service: 2021    Discharge Recommendations:  Patient would benefit from continued therapy after discharge   PT Equipment Recommendations  Equipment Needed: No    Assessment   Body structures, Functions, Activity limitations: Decreased functional mobility ; Decreased strength;Decreased endurance;Decreased balance;Decreased posture  Assessment: Pt exhibits limitations in functional mobility secondary to deficits in endurance and balance. Pt requires SBA for transfers. Pt tolerated 450' of ambulation with SBA and maintained SpO2 >90%. Pt verbalized increased effort needed for respiration with minimal physical exertion. Pt would be expected to return to prior living situation safely. Pt would benefit from continued therapy after d/c to improve high-level balance and endurance. PT Education: General Safety;Gait Training;Transfer Training;Energy Conservation; Functional Mobility Training  Patient Education: Pt educated on deep breathing technique and balance strategies. REQUIRES PT FOLLOW UP: Yes  Activity Tolerance  Activity Tolerance: Patient limited by fatigue;Patient limited by endurance  Activity Tolerance: Pt demonstrated improvement in endurance on this date. Pt able to ambulate 400' without rest breaks but verbalized increased exertion for respiration. Pt verbalized feeling fatigued during balance activities but maintained SpO2 >90% and HR <120 bpm.     Patient Diagnosis(es): The primary encounter diagnosis was COPD with acute bronchitis ([facility] [address] Diagnoses of Hypoxia and Hypoglycemia were also pertinent to this visit.      has a past medical history of ADD (attention deficit disorder), Arrhythmia, Asthma, Back pain, Bronchitis, Caffeine use, Cholelithiasis, COPD (chronic obstructive pulmonary disease) ([address] Depression, Diabetes mellitus ([address] Diabetes mellitus type 2, noninsulin dependent ([facility] [address] Diarrhea, GERD (gastroesophageal reflux disease), Hyperlipidemia, Kidney infection, Kidney stone, Kidney stones, Pneumonia, and Sleep apnea. has a past surgical history that includes  section ( & ); Cholecystectomy (); Hysterectomy; Colonoscopy (); Cystoscopy; Lithotripsy; Hysterectomy, total abdominal (); Tubal ligation (); Tonsillectomy and adenoidectomy (); and Hand surgery. Restrictions  Restrictions/Precautions  Restrictions/Precautions: Fall Risk  Required Braces or Orthoses?: No  Position Activity Restriction  Other position/activity restrictions: Up as tolerated - 5L O2 via nasal cannula; 6L O2 portable tank  Subjective   General  Chart Reviewed: No  Response To Previous Treatment: Patient with no complaints from previous session. Family / Caregiver Present: No  Subjective  Subjective: Pt and RN agreeable to therapy. Pt denies pain and reports she is feeling much better. Pain Screening  Patient Currently in Pain: Denies  Vital Signs  Patient Currently in Pain: Denies       Orientation  Orientation  Overall Orientation Status: Within Functional Limits  Cognition   Cognition  Overall Cognitive Status: WFL  Objective   Bed mobility  Comment: Pt sitting in chair upon arrival and retired to chair at end of session. Transfers  Sit to Stand: Stand by assistance  Stand to sit: Stand by assistance  Comment: Transfers assessed without AD. STS x4. Pt on 5L O2 in room - SpO2 stayed >90% and HR ranged from 103-111 bpm upon standing. Ambulation  Ambulation?: Yes  More Ambulation?: No  Ambulation 1  Surface: level tile  Device: No Device  Other Apparatus: O2 (6L)  Assistance: Stand by assistance  Quality of Gait: increased VIVIANA, decreased step length, decreased ludwig, mildly unsteady  Gait Deviations: Slow Ludwig; Increased VIVIANA; Decreased step length  Distance: 450' x1  Comments: Pt's SpO2 stayed between 93-94% and HR ranged from 111-118 with ambulation. Pt did not need standing rest break but did report increase effort needed for respiration. Pt had 1 moderate LOB  about correction but was able to recover with stepping strategy and no physical assistance. Pt moved closer to the wall after LOB and used espinoza rail as able (5-10% of distance ambulated). Stairs/Curb  Stairs?: No     Balance  Posture: Fair  Sitting - Static: Fair;+  Sitting - Dynamic: Fair;+  Standing - Static: Fair;-  Standing - Dynamic: Fair;-  Comments: Standing balance assessed without AD. Pt exhibited moderate unsteadiness with challenging standing balance activites (narrow VIVIANA, tandem stance, bending to reach items). Pt showed increased unsteadiness with tandem stance vs narrow VIVIANA. Romberg/Narrow Base of Support  Eyes Open:  (30 secs x 3)  Sway: Moderate  Strategy: Hip (Pt had LOB to L ~2x/set and recovered with UE support on hospital bed. Pt used hip and ankle strategy to minimize LOB throughout.)  Sharpened Romberg/Tandem  Eyes Open:  (30 sec x3 - right and left)  Sway: Moderate  Strategy: Step (Pt exhibited 3-4 LOB with UE support needed for recover. Pt primarily used stepping strategy to recover balance in tandem stand but exhibited both hip and ankle strategies to maintain balance.)  Other exercises  Other exercises?: Yes  Other exercises 1: Narrow VIVIANA standing balance; 30 sec x 3 w/ CGA throughout and Mello for recovery of balance  Other exercises 2: L and R tandem stance; 30 sec x 3 each w/ CGA throughout and Mello for recovery of balance  Other exercises 3: Picking up items from floor; 3 reps x3 w/ CGA throughout and Mello for recovery of balance     Goals  Short term goals  Time Frame for Short term goals: 14 visits  Short term goal 1: Pt ambulates 200' independently with self-recognition and initiation of rest breaks and deep breathing are needed to improve mobility.   Short term goal 2: Pt navigates 4 stairs independently without rails to facilitate return to prior living situation. Short term goal 3: Pt improves standing balance to good to decrease risk of falls. Short term goal 4: Pt tolerates 30 minutes of therapy to aid endurance with functional mobility. Patient Goals   Patient goals : Pt would like to return home    Plan    Plan  Times per week: 5-6x  Times per day: Daily  Current Treatment Recommendations: Strengthening, ROM, Balance Training, Functional Mobility Training, Transfer Training, Stair training, Gait Training, Endurance Training, Neuromuscular Re-education, Safety Education & Training, Home Exercise Program  Safety Devices  Type of devices: Gait belt, Nurse notified, Call light within reach, Left in chair  Restraints  Initially in place: No     Therapy Time   Individual Concurrent Group Co-treatment   Time In 1034         Time Out 1103         Minutes 29         Timed Code Treatment Minutes: 29 Minutes       DAMIAN Gutierrez  Evaluation/treatment performed by Student PT under the supervision of co-signing PT who agrees with all evaluation/treatment and documentation.

## 2021-06-16 NOTE — PROGRESS NOTES
Occupational Therapy  Facility/Department: [facility]  Daily Treatment Note  NAME: Herman Kaiser  : 1970  MRN: [id number]    Date of Service: 2021    Chief Complaint   Patient presents with    Shortness of Breath     2 DAYS OF SHORTNESS OF BREATH AND OUT OF INHALER. PT REPORTS USING DAUGHTERS NEBULIZER MACHINE AND MEDS. HX OF COUGH     Discharge Recommendations:  Patient would benefit from continued therapy after discharge, Continue to assess pending progress  OT Equipment Recommendations  Equipment Needed: Yes  Mobility Devices: CanSamasource  Cane: Piqora  ADL Assistive Devices: Shower Chair with back;Grab Bars - shower;Grab Bars - toilet    Assessment   Performance deficits / Impairments: Decreased functional mobility ; Decreased balance;Decreased safe awareness;Decreased ADL status; Decreased high-level IADLs  Comments: Pt tx this AM by OT with the above noted performance deficits impacting functional transfers/mobility and ADL/IADL tasks. Pt required 6L O2 using portable tank during functional mobility/transfers and ADL tasks w/saturation rate remaining above 90% during functional tasks w/no observed fatigue/SOB/dizziness throughout session. Pt required min verbal/tactle cues secondary to decreased safety awareness and slight impulsivity when participating in functional mobility and functional ADL tasks. Pt would continue to benefit from skilled OT services during hospitalization and discharge Pt expectant for safe return to prior living with initial services/supervision upon discharge secondary to pt's reduced activity tolerance and balance to perform all functional activities. Prognosis: Good  Decision Making: Medium Complexity  OT Education: OT Role;Plan of Care;Precautions; Equipment; Energy Conservation  REQUIRES OT FOLLOW UP: Yes  Activity Tolerance  Activity Tolerance: Patient Tolerated treatment well  Safety Devices  Type of devices: Call light within reach; Left in bed;Nurse notified; All fall risk precautions in place;Gait belt  Restraints  Initially in place: No         Patient Diagnosis(es): The primary encounter diagnosis was COPD with acute bronchitis ([facility] [address] Diagnoses of Hypoxia and Hypoglycemia were also pertinent to this visit. has a past medical history of ADD (attention deficit disorder), Arrhythmia, Asthma, Back pain, Bronchitis, Caffeine use, Cholelithiasis, COPD (chronic obstructive pulmonary disease) ([facility] [address] Depression, Diabetes mellitus ([facility] [address] Diabetes mellitus type 2, noninsulin dependent ([facility] [address] Diarrhea, GERD (gastroesophageal reflux disease), Hyperlipidemia, Kidney infection, Kidney stone, Kidney stones, Pneumonia, and Sleep apnea. has a past surgical history that includes  section ( & ); Cholecystectomy (); Hysterectomy; Colonoscopy (); Cystoscopy; Lithotripsy; Hysterectomy, total abdominal (); Tubal ligation (); Tonsillectomy and adenoidectomy (); and Hand surgery.     Restrictions  Restrictions/Precautions  Restrictions/Precautions: Fall Risk  Required Braces or Orthoses?: No  Position Activity Restriction  Other position/activity restrictions: Up as tolerated - 5L O2 via nasal cannula; 6L O2 portable tank     Subjective   General  Patient assessed for rehabilitation services?: Yes  Response to previous treatment: Patient with no complaints from previous session  Family / Caregiver Present: No  General Comment  Comments: RN ok'd for tx this AM  Pain Assessment  Pain Level: Pt denies pain at this time  Clinical Progression: Not changed  Pre Treatment Pain Screening  Intervention List: Patient able to continue with treatment    Oxygen Therapy  SpO2: 90 %  Pulse Oximeter Device Mode: Continuous  Pulse Oximeter Device Location: Right;Finger (O2 saturation rate checked x1 sat 90% during functional ADL tasks w/6L O2)  O2 Device: Nasal cannula (6L O2 w/portable O2 tank)     Orientation  Orientation  Overall Orientation Status: Within Functional Limits       Objective    ADL  Grooming: Stand by assistance; Increased time to complete (pt performed oral care, brushed hair, and washed face w/foam soap standing at sink side)  UE Dressing: Stand by assistance; Increased time to complete (pt don/[facility] gown > personal t-shirt standing at EOB)  LE Dressing: Stand by assistance; Increased time to complete (pt ny  hospital socks seated at EOB)  Additional Comments: pt performed the above functional ADLs w/slight impulsivity and unsteadiness throughout however to true LOB/SOB/or dizziness reported        Balance  Sitting Balance: Stand by assistance (pt seated for static/dynamic sitting balance unsupported at EOB ~3mins)  Standing Balance: Stand by assistance  Standing Balance  Time: ~7mins  Activity: Pt stood EOB for UB dressing and sink side for UB grooming  Comment: Pt observed w/slight unsteadiness during static/dynamic standing balance and able to correct self to prevent true LOB; pt is aware of unsteadiness when performing functional ADLs      Functional Mobility  Functional - Mobility Device: No device  Activity: To/from bathroom  Assist Level: Stand by assistance  Functional Mobility Comments: pt required SBA and VCs required secondary to decreased safety awareness and slight impulsivity when managing O2 tank to/from bathroom - pt reports mildly unsteady at times with unknown preciptating events; no true LOB observed this date. Upon functional mobility, pt HR increased to ~140 with steadily declined to ~117 upon return to seated.       Bed mobility  Supine to Sit: Stand by assistance  Sit to Supine: Stand by assistance  Scooting: Supervision  Comment: HOB elevated ~40 degrees; pt supin in bed upon arrival; pt retired to bed at end of session       Transfers  Sit to stand: Stand by assistance  Stand to sit: Stand by assistance  Transfer Comments: pt performed sit <> stand transfer at EOB w/slight impulsivity and required min verbal/tactile cues for safety awareness; pt denies any lightheadiness or dizziness during tranfers      Cognition  Overall Cognitive Status: Exceptions  Arousal/Alertness: Appropriate responses to stimuli  Following Commands: Follows multistep commands consistently  Attention Span: Appears intact  Memory: Appears intact  Safety Judgement: Decreased awareness of need for assistance;Decreased awareness of need for safety  Problem Solving: Assistance required to identify errors made;Assistance required to correct errors made  Insights: Decreased awareness of deficits  Initiation: Does not require cues  Sequencing: Does not require cues       Plan   Plan  Times per week: 5-6x/week  Times per day: Daily  Current Treatment Recommendations: Safety Education & Training, Balance Training, Patient/Caregiver Education & Training, Self-Care / ADL, Functional Mobility Training, Endurance Training, Equipment Evaluation, Education, & procurement, Home Management Training      Goals  Short term goals  Time Frame for Short term goals: 10 visits  Short term goal 1: Pt will demo Mod IND, with DME as needed, to perform grooming/UB ADLs. Short term goal 2: Pt will demo Mod IND, with DME as needed, to perform toileting/LB ADLs. Short term goal 3: Pt will demo Mod IND, with LRD, to perform functional mobility/transfers during ADLs. Short term goal 4: Pt will tolerate 10+ minutes of dynamic standing balance during a functional tasks for increased participation in ADLs. Short term goal 5: Pt will tolerate 15+ minutes of activity tolerance to increase engagement in ADLs/IADLs and functional mobility. Short term goal 6: Pt will demo good safety awareness throughout participation in functional tasks, 100% of the time, for increased engagement during ADLs and functional mobility/transfers.        Therapy Time   Individual Concurrent Group Co-treatment   Time In 0841         Time Out 0904         Minutes 23         Timed Code Treatment Minutes: [address] S/OT

## 2021-06-16 NOTE — FLOWSHEET NOTE
SITUATION:  Writer had an initial visit with Patient. Patient was sitting in her chair by the window. ASSESSMENT:  Patient expressed that she was feeling \"better than she has in months. \" Patient expressed how she was coping and she talked about her support system. Patient spoke about her family and some of her future plans. Patient expressed gratitude for the hospital care and the staff. INTERVENTION:  Writer introduced himself and his services; inquired about how Patient was coping and her support system; listened while Patient spoke about her family and some future family events; affirmed patient's gratitude for the hospital care that she has received. OUTCOME:  Patient expressed gratitude for visit. PLAN:  Writer will attempt a follow up visit tomorrow. 06/16/21 1244   Encounter Summary   Services provided to: Patient   Referral/Consult From: [facility] Family members;Spouse   Continue Visiting   (6/16/21)   Complexity of Encounter Moderate   Length of Encounter 15 minutes   Routine   Type Initial   Spiritual/Episcopalian   Type Spiritual support   Assessment Calm; Approachable;Coping   Intervention Active listening;Explored coping resources; Explored feelings, thoughts, concerns;Sustaining presence/ Ministry of presence   Outcome Expressed gratitude;Engaged in conversation;Coping;Receptive             Electronically signed by Chaplain Guanaco Intern on 6/16/2021 at 12:46 PM

## 2021-06-16 NOTE — PROGRESS NOTES
PULMONARY & CRITICAL CARE MEDICINE PROGRESS  NOTE     Patient:  Jazzy Leo  MRN: [id number]  Admit date: 6/14/2021  Primary Care Physician: Maria Elena Iglesias MD  Consulting Physician: Tonya Lazcano MD  CODE Status: Full Code  LOS: 2    SUBJECTIVE     I personally interviewed/examined the patient, reviewed interval history and interpreted all available radiographic, laboratory data at the time of service. Chief Compliant/Reason for Initial Consult:     COPD exacerbation/acute hypoxic respiratory failure/pneumonia    Brief Hospital Course: The patient is a 44 y.o. female history of COPD severity not known, diabetes mellitus, obstructive sleep apnea intolerant/noncompliant with CPAP, chronic hypoxic respiratory failure supposed to be on home O2 but according to patient she was not using it and returned home O2 history of tobacco use and current smoker. According to patient about 4 days ago she started having symptoms of headache, nasal obstruction she feels like cold-like symptoms myalgia and diarrhea, her appetite was decreased she also had nausea without vomiting. According to patient she did not check her fever she did not know if she had fever. Along with cough and wheezing which started almost the same time, cough is productive of sputum. Shortness of breath was getting worse she came to emergency room as she could not breathe. According to patient she does have history of dyspnea on activities and exertion sometimes she gets shortness of breath on regular activities and going stairs up, at other times she is able to do her regular activities without getting shortness of breath. In the emergency room she had a chest x-ray done which was suggestive of mild bilateral infiltrate. She was being hypoxic since admission last night requiring 6 L of oxygen to maintain saturation 91 to 92%.   She was placed on CPAP last night and according to patient she uses CPAP CPAP pressure was 5 cm.     According to patient she had left sided procedures and for renal stone with surgery and she was told at that time that she had some lung puncture. She does not remember if she had a chest tube at that time. Records not available.     Initial WBC count was 15. Chest x-ray was repeated today which showed slight increase infiltrate. On review of the chest x-ray she had left lower to mid lung chronic pleuroparenchymal change at the left base which was present on chest x-ray in 2006 also. CT scan of the chest just done which shows pleural-based pleural-parenchymal opacity laterally in left lower lung field. There are scattered mild areas of opacities in upper/lower lung field bilaterally. According to patient she had Covid vaccine both the doses in March/April. Nasopharyngeal respiratory panel was sent and it is pending.     She has history of smoking for 40 years 1 pack/day and she is a current smoker    Interval History:  06/16/21  Overnight events noted, chart seen other labs reviewed. She is afebrile with T-max of 98.1. Heart rate is in 70s usually. She is on 5 L nasal cannula and at rest she is saturating 95% and usually saturation between 91 to 97% on 5 L. She had use CPAP last night CPAP pressure was increased to 8 cm. According to patient she slept very well denies any problem using CPAP last night. She does have cough which is dry denies sputum production denies chest pain or hemoptysis. Her appetite is better and she denies nausea vomiting and no diarrhea now. According to nursing staff she desaturate on ambulating and continue to require 5 L nasal cannula. Respiratory nasopharyngeal panel was negative except rhino/enterovirus PCR detected. Rapid COVID-19 negative.   Urine Legionella antigen negative    Review of Systems:  Review of Systems   Constitutional: Negative for appetite change, fatigue, fever and unexpected weight change. HENT: Positive for postnasal drip and sinus pressure. Negative for nosebleeds, sneezing, sore throat, trouble swallowing and voice change. Eyes: Negative for photophobia, discharge, redness and visual disturbance. Respiratory: Positive for cough, shortness of breath and wheezing. Negative for choking and chest tightness. Cardiovascular: Negative for chest pain, palpitations and leg swelling. Gastrointestinal: Negative for abdominal pain, constipation, diarrhea, nausea and vomiting. Endocrine: Positive for polyuria. Negative for cold intolerance, heat intolerance, polydipsia and polyphagia. Genitourinary: Negative for difficulty urinating, dysuria, frequency and hematuria. Musculoskeletal: Negative for arthralgias, back pain, gait problem, joint swelling and myalgias. Skin: Negative for color change, pallor and rash. Allergic/Immunologic: Negative. Neurological: Negative for dizziness, speech difficulty and headaches. Hematological: Negative for adenopathy. Does not bruise/bleed easily. Psychiatric/Behavioral: Negative.         OBJECTIVE     VITAL SIGNS:   LAST-  /79   Pulse 58   Temp 97.7 °F (36.5 °C) (Oral)   Resp 16   Ht 5' 5\" (1.651 m)   Wt 230 lb 9.6 oz (104.6 kg)   SpO2 97%   BMI 38.37 kg/m²   8-24 HR RANGE-  TEMP Temp  Av.9 °F (36.6 °C)  Min: 97.7 °F (36.5 °C)  Max: 98.1 °F (70.7 °C)   BP Systolic (46LMM), GIP:102 , Min:113 , CHACORTA:078      Diastolic (98UDB), XND:59, Min:79, Max:81     PULSE Pulse  Av.5  Min: 55  Max: 109   RR Resp  Av.1  Min: 14  Max: 19   O2 SAT SpO2  Av.5 %  Min: 91 %  Max: 99 %   OXYGEN DELIVERY O2 Flow Rate (L/min)  Av L/min  Min: 5 L/min  Max: 5 L/min     Systemic Examination:   Physical Exam  General appearance - looks comfortable and in no acute distress at rest.  Mildly tachypneic  Mental status - alert, oriented to person, place, and time  Eyes - pupils equal and reactive, extraocular eye movements intact  Mouth - mucous membranes moist, pharynx normal without lesions. Large tongue Mallampati 2 small oropharynx  Neck - supple, no significant adenopathy. Short thick neck  Chest - Chest was symmetrical without dullness to percussion. Bilateral breath sound is present with prolonged expiration and scattered rhonchi and positive expiratory wheezing. She had bibasilar crackles present. There is no intercostal recession or use of accessory muscles  Heart - normal rate, regular rhythm, normal S1, S2, no murmurs, rubs, clicks or gallops  Abdomen - soft, nontender, nondistended, no masses or organomegaly  Neurological - alert, oriented, normal speech, no focal findings or movement disorder noted  Extremities - peripheral pulses normal, no pedal edema, no clubbing or cyanosis  Skin - normal coloration and turgor, no rashes, no suspicious skin lesions noted     DATA REVIEW     Medications:  Scheduled Meds:   insulin glargine  20 Units Subcutaneous QAM    methylPREDNISolone  40 mg Intravenous Q6H    pantoprazole  40 mg Oral QAM AC    traZODone  100 mg Oral Nightly    atorvastatin  10 mg Oral Daily    insulin lispro  0-12 Units Subcutaneous TID WC    insulin lispro  0-6 Units Subcutaneous Nightly    sodium chloride flush  5-40 mL Intravenous 2 times per day    enoxaparin  40 mg Subcutaneous Daily    ipratropium-albuterol  1 ampule Inhalation Q4H WA    doxycycline hyclate  100 mg Oral Q12H    cefTRIAXone (ROCEPHIN) IV  1,000 mg Intravenous Q24H    insulin glargine  30 Units Subcutaneous QPM    amLODIPine  5 mg Oral Nightly    nicotine  1 patch Transdermal Daily     Continuous Infusions:   dextrose      [Held by provider] sodium chloride Stopped (06/14/21 3936)    sodium chloride       LABS:-  ABG:   No results for input(s): POCPH, POCPCO2, POCPO2, POCHCO3, NFYG9ICO in the last 72 hours.   CBC:   Recent Labs     06/14/21  1713 06/15/21  0537   WBC 15.9* 14.6*   HGB 15.0 14.0   HCT 45.4 42.7   MCV 90.1 91.7    309 LYMPHOPCT 14*  --    RBC 5.03 4.65   MCH 29.9 30.1   MCHC 33.1 32.8   RDW 12.9 13.1     BMP:   Recent Labs     06/14/21  1713 06/15/21  0537    136   K 3.8 4.6   CL 99 104   CO2 24 25   BUN 9 11   CREATININE 0.49* 0.47*   GLUCOSE 245* 301*     Liver Function Test:   Recent Labs     06/14/21  1713   PROT 8.2   LABALBU 4.3   ALT 9   AST 10   ALKPHOS 133*   BILITOT 0.94     Amylase/Lipase:  No results for input(s): AMYLASE, LIPASE in the last 72 hours. Coagulation Profile:   No results for input(s): INR, PROTIME, APTT in the last 72 hours. Cardiac Enzymes:  No results for input(s): CKTOTAL, CKMB, CKMBINDEX, TROPONINI in the last 72 hours. Lactic Acid:  Lab Results   Component Value Date    LACTA 2.1 05/07/2021     BNP:   No results found for: BNP  D-Dimer:  Lab Results   Component Value Date    DDIMER 0.46 06/14/2021     Others:   No results found for: TSH, M4ANUGX, K9QKJKR, THYROIDAB, FT3, T4FREE  Lab Results   Component Value Date    CRP 16.0 (H) 06/15/2021     No results found for: Jai Barefoot  Lab Results   Component Value Date    FERRITIN 481 (H) 06/15/2021     No results found for: SPEP, UPEP  No results found for: PSA, CEA, , AW2042,     Input/Output:    Intake/Output Summary (Last 24 hours) at 6/16/2021 0849  Last data filed at 6/16/2021 0753  Gross per 24 hour   Intake 640 ml   Output 1250 ml   Net -610 ml       Microbiology:  Recent Labs     06/15/21  1615   [facility] . NASOPHARYNGEAL SWAB       Pathology:    Radiology reports:  CT CHEST W CONTRAST   Final Result   1. Patient has mild emphysematous changes and evidence of scarring in the   lungs. 2.  Scattered ground-glass opacities in both lung fields predominantly in the   lower lung zones. This may be on the basis of inflammatory process or   pneumonitis such as COVID-19 infection. 3.  No gross mediastinal adenopathy. 4.  Hepatic steatosis without focal mass lesion.       RECOMMENDATIONS:   Correlation for possible COVID-19 infection. XR CHEST PORTABLE   Final Result   Small bilateral effusions with scattered bilateral pulmonary infiltrates   concerning for pneumonia. XR CHEST PORTABLE   Final Result   *Borderline cardiomegaly with mild perihilar interstitial prominence   suggesting vascular congestion. *No focal airspace consolidation or overt CHF. Echocardiogram:   No results found for this or any previous visit. Cardiac Catheterization:   No results found for this or any previous visit. ASSESSMENT AND PLAN     Assessment:    // Bilateral mild pulmonary opacities/pneumonia differential diagnosis would include community-acquired pneumonia secondary to viral, atypical including mycoplasma chlamydia and or bacterial pneumonia  // Acute hypoxic respiratory failure  // Chronic hypoxic respiratory failure  // COPD exacerbation  // Obstructive sleep apnea intolerant/noncompliant with CPAP/BiPAP.  // Obesity  // Diabetes mellitus with hyperglycemia  // Tobacco dependence greater than 40-pack-year smoking.  // Chronic left pleural parenchymal change. Plan:    Urine Legionella antigen is negative respiratory panel is positive for rhino/enterovirus. Mycoplasma IgM pending. Please follow-up  She is currently on 5 L nasal cannula would recommend to wean O2 to keep saturation 90%. May use high flow nasal cannula if needed. Continue nocturnal CPAP and as needed CPAP/BiPAP during the daytime. Increase CPAP pressure to 10 cm. Continue with DuoNeb aerosol  Currently she is on ceftriaxone and on doxycycline.   If no contraindication recommend levofloxacin on discharge to cover community-acquired/atypical pathogen (allergy to Zithromax)  Continue incentive spirometry, pulmonary toilet, aspiration precautions and bronchodilators  Continue to monitor I/O with a goal of even/negative fluid balance  Continue  IV Solu-Medrol but will decrease to 40 mg every 12 hours as she is significantly

## 2021-06-17 ENCOUNTER — APPOINTMENT (OUTPATIENT)
Dept: GENERAL RADIOLOGY | Age: 51
DRG: 189 | End: 2021-06-17
Payer: COMMERCIAL

## 2021-06-17 VITALS
DIASTOLIC BLOOD PRESSURE: 61 MMHG | TEMPERATURE: 97.7 F | SYSTOLIC BLOOD PRESSURE: 122 MMHG | HEART RATE: 88 BPM | OXYGEN SATURATION: 92 % | BODY MASS INDEX: 39.08 KG/M2 | RESPIRATION RATE: 18 BRPM | HEIGHT: 65 IN | WEIGHT: 234.57 LBS

## 2021-06-17 LAB
ANION GAP SERPL CALCULATED.3IONS-SCNC: 9 MMOL/L (ref 9–17)
BUN BLDV-MCNC: 24 MG/DL (ref 6–20)
BUN/CREAT BLD: ABNORMAL (ref 9–20)
CALCIUM SERPL-MCNC: 9.6 MG/DL (ref 8.6–10.4)
CHLORIDE BLD-SCNC: 101 MMOL/L (ref 98–107)
CO2: 27 MMOL/L (ref 20–31)
CREAT SERPL-MCNC: 0.75 MG/DL (ref 0.5–0.9)
GFR AFRICAN AMERICAN: >60 ML/MIN
GFR NON-AFRICAN AMERICAN: >60 ML/MIN
GFR SERPL CREATININE-BSD FRML MDRD: ABNORMAL ML/MIN/{1.73_M2}
GFR SERPL CREATININE-BSD FRML MDRD: ABNORMAL ML/MIN/{1.73_M2}
GLUCOSE BLD-MCNC: 288 MG/DL (ref 65–105)
GLUCOSE BLD-MCNC: 483 MG/DL (ref 70–99)
HCT VFR BLD CALC: 39.7 % (ref 36–46)
HEMOGLOBIN: 13 G/DL (ref 12–16)
MCH RBC QN AUTO: 30.1 PG (ref 26–34)
MCHC RBC AUTO-ENTMCNC: 32.7 G/DL (ref 31–37)
MCV RBC AUTO: 92.2 FL (ref 80–100)
NRBC AUTOMATED: ABNORMAL PER 100 WBC
PDW BLD-RTO: 13.3 % (ref 12.5–15.4)
PLATELET # BLD: 292 K/UL (ref 140–450)
PMV BLD AUTO: 7.1 FL (ref 6–12)
POTASSIUM SERPL-SCNC: 5.1 MMOL/L (ref 3.7–5.3)
RBC # BLD: 4.31 M/UL (ref 4–5.2)
SODIUM BLD-SCNC: 137 MMOL/L (ref 135–144)
WBC # BLD: 18.8 K/UL (ref 3.5–11)

## 2021-06-17 PROCEDURE — 6370000000 HC RX 637 (ALT 250 FOR IP): Performed by: STUDENT IN AN ORGANIZED HEALTH CARE EDUCATION/TRAINING PROGRAM

## 2021-06-17 PROCEDURE — 71045 X-RAY EXAM CHEST 1 VIEW: CPT

## 2021-06-17 PROCEDURE — 80048 BASIC METABOLIC PNL TOTAL CA: CPT

## 2021-06-17 PROCEDURE — 94618 PULMONARY STRESS TESTING: CPT

## 2021-06-17 PROCEDURE — 6360000002 HC RX W HCPCS: Performed by: INTERNAL MEDICINE

## 2021-06-17 PROCEDURE — 82947 ASSAY GLUCOSE BLOOD QUANT: CPT

## 2021-06-17 PROCEDURE — 6370000000 HC RX 637 (ALT 250 FOR IP): Performed by: NURSE PRACTITIONER

## 2021-06-17 PROCEDURE — 99239 HOSP IP/OBS DSCHRG MGMT >30: CPT | Performed by: STUDENT IN AN ORGANIZED HEALTH CARE EDUCATION/TRAINING PROGRAM

## 2021-06-17 PROCEDURE — 97535 SELF CARE MNGMENT TRAINING: CPT

## 2021-06-17 PROCEDURE — 6360000002 HC RX W HCPCS

## 2021-06-17 PROCEDURE — 36415 COLL VENOUS BLD VENIPUNCTURE: CPT

## 2021-06-17 PROCEDURE — APPSS45 APP SPLIT SHARED TIME 31-45 MINUTES: Performed by: NURSE PRACTITIONER

## 2021-06-17 PROCEDURE — 2580000003 HC RX 258: Performed by: NURSE PRACTITIONER

## 2021-06-17 PROCEDURE — 94761 N-INVAS EAR/PLS OXIMETRY MLT: CPT

## 2021-06-17 PROCEDURE — 97116 GAIT TRAINING THERAPY: CPT

## 2021-06-17 PROCEDURE — 99232 SBSQ HOSP IP/OBS MODERATE 35: CPT | Performed by: INTERNAL MEDICINE

## 2021-06-17 PROCEDURE — 97112 NEUROMUSCULAR REEDUCATION: CPT

## 2021-06-17 PROCEDURE — 6360000002 HC RX W HCPCS: Performed by: NURSE PRACTITIONER

## 2021-06-17 PROCEDURE — 85027 COMPLETE CBC AUTOMATED: CPT

## 2021-06-17 PROCEDURE — 2700000000 HC OXYGEN THERAPY PER DAY

## 2021-06-17 PROCEDURE — 94640 AIRWAY INHALATION TREATMENT: CPT

## 2021-06-17 RX ORDER — ASPIRIN 81 MG/1
81 TABLET, CHEWABLE ORAL DAILY
Qty: 30 TABLET | Refills: 3 | Status: SHIPPED | OUTPATIENT
Start: 2021-06-18

## 2021-06-17 RX ORDER — ATORVASTATIN CALCIUM 40 MG/1
40 TABLET, FILM COATED ORAL DAILY
Qty: 30 TABLET | Refills: 0 | Status: SHIPPED | OUTPATIENT
Start: 2021-06-18 | End: 2021-07-18

## 2021-06-17 RX ORDER — IPRATROPIUM BROMIDE AND ALBUTEROL SULFATE 2.5; .5 MG/3ML; MG/3ML
3 SOLUTION RESPIRATORY (INHALATION) EVERY 6 HOURS PRN
Qty: 360 ML | Refills: 1 | Status: SHIPPED | OUTPATIENT
Start: 2021-06-17 | End: 2021-08-16

## 2021-06-17 RX ORDER — INSULIN GLARGINE 100 [IU]/ML
30 INJECTION, SOLUTION SUBCUTANEOUS 2 TIMES DAILY
Qty: 5 PEN | Refills: 3 | Status: SHIPPED | OUTPATIENT
Start: 2021-06-17

## 2021-06-17 RX ORDER — BUDESONIDE AND FORMOTEROL FUMARATE DIHYDRATE 160; 4.5 UG/1; UG/1
2 AEROSOL RESPIRATORY (INHALATION) 2 TIMES DAILY
Qty: 1 INHALER | Refills: 3 | Status: SHIPPED | OUTPATIENT
Start: 2021-06-17

## 2021-06-17 RX ORDER — LEVOFLOXACIN 500 MG/1
500 TABLET, FILM COATED ORAL DAILY
Status: DISCONTINUED | OUTPATIENT
Start: 2021-06-17 | End: 2021-06-17 | Stop reason: HOSPADM

## 2021-06-17 RX ORDER — PREDNISONE 20 MG/1
40 TABLET ORAL DAILY
Status: DISCONTINUED | OUTPATIENT
Start: 2021-06-18 | End: 2021-06-17 | Stop reason: HOSPADM

## 2021-06-17 RX ORDER — LOSARTAN POTASSIUM 25 MG/1
12.5 TABLET ORAL DAILY
Qty: 15 TABLET | Refills: 0 | Status: SHIPPED | OUTPATIENT
Start: 2021-06-18 | End: 2021-08-12

## 2021-06-17 RX ORDER — POLYETHYLENE GLYCOL 3350 17 G/17G
17 POWDER, FOR SOLUTION ORAL ONCE
Status: COMPLETED | OUTPATIENT
Start: 2021-06-17 | End: 2021-06-17

## 2021-06-17 RX ORDER — BENZONATATE 100 MG/1
100 CAPSULE ORAL 3 TIMES DAILY PRN
Qty: 21 CAPSULE | Refills: 0 | Status: SHIPPED | OUTPATIENT
Start: 2021-06-17 | End: 2021-06-24

## 2021-06-17 RX ORDER — INSULIN GLARGINE 100 [IU]/ML
30 INJECTION, SOLUTION SUBCUTANEOUS EVERY MORNING
Status: DISCONTINUED | OUTPATIENT
Start: 2021-06-17 | End: 2021-06-17 | Stop reason: HOSPADM

## 2021-06-17 RX ORDER — FUROSEMIDE 10 MG/ML
20 INJECTION INTRAMUSCULAR; INTRAVENOUS ONCE
Status: COMPLETED | OUTPATIENT
Start: 2021-06-17 | End: 2021-06-17

## 2021-06-17 RX ORDER — INSULIN GLARGINE 100 [IU]/ML
20 INJECTION, SOLUTION SUBCUTANEOUS ONCE
Status: COMPLETED | OUTPATIENT
Start: 2021-06-17 | End: 2021-06-17

## 2021-06-17 RX ORDER — FUROSEMIDE 10 MG/ML
INJECTION INTRAMUSCULAR; INTRAVENOUS
Status: COMPLETED
Start: 2021-06-17 | End: 2021-06-17

## 2021-06-17 RX ORDER — GUAIFENESIN 600 MG/1
600 TABLET, EXTENDED RELEASE ORAL 2 TIMES DAILY
Qty: 14 TABLET | Refills: 0 | Status: SHIPPED | OUTPATIENT
Start: 2021-06-17 | End: 2021-06-24

## 2021-06-17 RX ORDER — PREDNISONE 10 MG/1
TABLET ORAL
Qty: 30 TABLET | Refills: 0 | Status: SHIPPED | OUTPATIENT
Start: 2021-06-17 | End: 2021-06-29

## 2021-06-17 RX ORDER — LEVOFLOXACIN 500 MG/1
500 TABLET, FILM COATED ORAL DAILY
Qty: 10 TABLET | Refills: 0 | Status: SHIPPED | OUTPATIENT
Start: 2021-06-18 | End: 2021-06-17

## 2021-06-17 RX ORDER — INSULIN LISPRO 100 [IU]/ML
3 INJECTION, SOLUTION INTRAVENOUS; SUBCUTANEOUS
Qty: 2.7 ML | Refills: 0 | Status: SHIPPED | OUTPATIENT
Start: 2021-06-17 | End: 2021-07-17

## 2021-06-17 RX ORDER — NICOTINE 21 MG/24HR
1 PATCH, TRANSDERMAL 24 HOURS TRANSDERMAL EVERY 24 HOURS
Qty: 30 PATCH | Refills: 0 | Status: SHIPPED | OUTPATIENT
Start: 2021-06-17

## 2021-06-17 RX ORDER — LEVOFLOXACIN 500 MG/1
500 TABLET, FILM COATED ORAL DAILY
Qty: 6 TABLET | Refills: 0 | Status: SHIPPED | OUTPATIENT
Start: 2021-06-18 | End: 2021-06-24

## 2021-06-17 RX ORDER — PEN NEEDLE, DIABETIC 31 G X1/4"
1 NEEDLE, DISPOSABLE MISCELLANEOUS DAILY
Qty: 100 EACH | Refills: 3 | Status: SHIPPED | OUTPATIENT
Start: 2021-06-17

## 2021-06-17 RX ADMIN — ENOXAPARIN SODIUM 40 MG: 40 INJECTION SUBCUTANEOUS at 08:41

## 2021-06-17 RX ADMIN — BUDESONIDE AND FORMOTEROL FUMARATE DIHYDRATE 2 PUFF: 160; 4.5 AEROSOL RESPIRATORY (INHALATION) at 08:23

## 2021-06-17 RX ADMIN — INSULIN GLARGINE 20 UNITS: 100 INJECTION, SOLUTION SUBCUTANEOUS at 08:41

## 2021-06-17 RX ADMIN — INSULIN GLARGINE 30 UNITS: 100 INJECTION, SOLUTION SUBCUTANEOUS at 08:43

## 2021-06-17 RX ADMIN — METHYLPREDNISOLONE SODIUM SUCCINATE 40 MG: 40 INJECTION, POWDER, FOR SOLUTION INTRAMUSCULAR; INTRAVENOUS at 06:03

## 2021-06-17 RX ADMIN — PANTOPRAZOLE SODIUM 40 MG: 40 TABLET, DELAYED RELEASE ORAL at 06:03

## 2021-06-17 RX ADMIN — IPRATROPIUM BROMIDE AND ALBUTEROL SULFATE 1 AMPULE: .5; 3 SOLUTION RESPIRATORY (INHALATION) at 15:39

## 2021-06-17 RX ADMIN — FUROSEMIDE 20 MG: 10 INJECTION INTRAMUSCULAR; INTRAVENOUS at 08:43

## 2021-06-17 RX ADMIN — INSULIN LISPRO 10 UNITS: 100 INJECTION, SOLUTION INTRAVENOUS; SUBCUTANEOUS at 08:43

## 2021-06-17 RX ADMIN — GUAIFENESIN 600 MG: 600 TABLET, EXTENDED RELEASE ORAL at 08:41

## 2021-06-17 RX ADMIN — IPRATROPIUM BROMIDE AND ALBUTEROL SULFATE 1 AMPULE: .5; 3 SOLUTION RESPIRATORY (INHALATION) at 08:22

## 2021-06-17 RX ADMIN — POLYETHYLENE GLYCOL 3350 17 G: 17 POWDER, FOR SOLUTION ORAL at 08:40

## 2021-06-17 RX ADMIN — INSULIN LISPRO 6 UNITS: 100 INJECTION, SOLUTION INTRAVENOUS; SUBCUTANEOUS at 12:32

## 2021-06-17 RX ADMIN — IPRATROPIUM BROMIDE AND ALBUTEROL SULFATE 1 AMPULE: .5; 3 SOLUTION RESPIRATORY (INHALATION) at 11:59

## 2021-06-17 RX ADMIN — ASPIRIN 81 MG: 81 TABLET, CHEWABLE ORAL at 08:41

## 2021-06-17 RX ADMIN — INSULIN LISPRO 10 UNITS: 100 INJECTION, SOLUTION INTRAVENOUS; SUBCUTANEOUS at 12:31

## 2021-06-17 RX ADMIN — ATORVASTATIN CALCIUM 40 MG: 40 TABLET, FILM COATED ORAL at 08:41

## 2021-06-17 RX ADMIN — LOSARTAN POTASSIUM 25 MG: 25 TABLET, FILM COATED ORAL at 08:41

## 2021-06-17 RX ADMIN — LEVOFLOXACIN 500 MG: 500 TABLET, FILM COATED ORAL at 08:41

## 2021-06-17 RX ADMIN — FUROSEMIDE 20 MG: 10 INJECTION, SOLUTION INTRAMUSCULAR; INTRAVENOUS at 08:43

## 2021-06-17 RX ADMIN — INSULIN LISPRO 12 UNITS: 100 INJECTION, SOLUTION INTRAVENOUS; SUBCUTANEOUS at 08:41

## 2021-06-17 RX ADMIN — SODIUM CHLORIDE, PRESERVATIVE FREE 10 ML: 5 INJECTION INTRAVENOUS at 08:40

## 2021-06-17 ASSESSMENT — ENCOUNTER SYMPTOMS
SINUS PRESSURE: 1
WHEEZING: 0
DIARRHEA: 0
EYE DISCHARGE: 0
SHORTNESS OF BREATH: 0
PHOTOPHOBIA: 0
ALLERGIC/IMMUNOLOGIC NEGATIVE: 1
CONSTIPATION: 0
NAUSEA: 0
BLOOD IN STOOL: 0
VOMITING: 0
CHEST TIGHTNESS: 0
EYE REDNESS: 0
VOICE CHANGE: 0
ABDOMINAL PAIN: 0
BACK PAIN: 0
CHOKING: 0
COLOR CHANGE: 0
WHEEZING: 1
COUGH: 1
TROUBLE SWALLOWING: 0
SHORTNESS OF BREATH: 1
SORE THROAT: 0

## 2021-06-17 ASSESSMENT — PAIN SCALES - GENERAL
PAINLEVEL_OUTOF10: 0
PAINLEVEL_OUTOF10: 0

## 2021-06-17 NOTE — PLAN OF CARE
RT available for Aerosol therapy . 02 at 3lpm producing a sat of 93% . Bilateral breath sounds reveal diminished with expiratory wheezing . Congested cough persistent .

## 2021-06-17 NOTE — PROGRESS NOTES
PULMONARY & CRITICAL CARE MEDICINE PROGRESS  NOTE     Patient:  Ellis Downs  MRN: [id number]  Admit date: 6/14/2021  Primary Care Physician: Rosy Chatterjee MD  Consulting Physician: Aleksandra Melgoza MD  CODE Status: Full Code  LOS: 3    SUBJECTIVE     I personally interviewed/examined the patient, reviewed interval history and interpreted all available radiographic, laboratory data at the time of service. Chief Compliant/Reason for Initial Consult:     COPD exacerbation/acute hypoxic respiratory failure/pneumonia    Brief Hospital Course: The patient is a 44 y.o. female history of COPD severity not known, diabetes mellitus, obstructive sleep apnea intolerant/noncompliant with CPAP, chronic hypoxic respiratory failure supposed to be on home O2 but according to patient she was not using it and returned home O2 history of tobacco use and current smoker. According to patient about 4 days ago she started having symptoms of headache, nasal obstruction she feels like cold-like symptoms myalgia and diarrhea, her appetite was decreased she also had nausea without vomiting. According to patient she did not check her fever she did not know if she had fever. Along with cough and wheezing which started almost the same time, cough is productive of sputum. Shortness of breath was getting worse she came to emergency room as she could not breathe. According to patient she does have history of dyspnea on activities and exertion sometimes she gets shortness of breath on regular activities and going stairs up, at other times she is able to do her regular activities without getting shortness of breath. In the emergency room she had a chest x-ray done which was suggestive of mild bilateral infiltrate. She was being hypoxic since admission last night requiring 6 L of oxygen to maintain saturation 91 to 92%.   She was placed on CPAP last night and according to patient she uses CPAP CPAP pressure was 5 cm.     According to patient she had left sided procedures and for renal stone with surgery and she was told at that time that she had some lung puncture. She does not remember if she had a chest tube at that time. Records not available.     Initial WBC count was 15. Chest x-ray was repeated today which showed slight increase infiltrate. On review of the chest x-ray she had left lower to mid lung chronic pleuroparenchymal change at the left base which was present on chest x-ray in 2006 also. CT scan of the chest just done which shows pleural-based pleural-parenchymal opacity laterally in left lower lung field. There are scattered mild areas of opacities in upper/lower lung field bilaterally. According to patient she had Covid vaccine both the doses in March/April. Nasopharyngeal respiratory panel was sent and it is pending.     She has history of smoking for 40 years 1 pack/day and she is a current smoker    Interval History:  06/17/21    AFEBRILE   SITTING IN CHAIR   FEELS BETTER BUT STILL HAS WHEEZING AND SOB WITH EX   NOT USING ACCESSORY MUSCLES . Review of Systems:  Review of Systems   Constitutional: Negative for appetite change, fatigue, fever and unexpected weight change. HENT: Positive for postnasal drip and sinus pressure. Negative for nosebleeds, sneezing, sore throat, trouble swallowing and voice change. Eyes: Negative for photophobia, discharge, redness and visual disturbance. Respiratory: Positive for cough, shortness of breath and wheezing. Negative for choking and chest tightness. Cardiovascular: Negative for chest pain, palpitations and leg swelling. Gastrointestinal: Negative for abdominal pain, constipation, diarrhea, nausea and vomiting. Endocrine: Positive for polyuria. Negative for cold intolerance, heat intolerance, polydipsia and polyphagia.    Genitourinary: Negative for difficulty urinating, dysuria, frequency and hematuria. Musculoskeletal: Negative for arthralgias, back pain, gait problem, joint swelling and myalgias. Skin: Negative for color change, pallor and rash. Allergic/Immunologic: Negative. Neurological: Negative for dizziness, speech difficulty and headaches. Hematological: Negative for adenopathy. Does not bruise/bleed easily. Psychiatric/Behavioral: Negative. OBJECTIVE     VITAL SIGNS:   LAST-  /63   Pulse 65   Temp 97.5 °F (36.4 °C) (Oral)   Resp 18   Ht 5' 5\" (1.651 m)   Wt 234 lb 9.1 oz (106.4 kg)   SpO2 92%   BMI 39.03 kg/m²   8-24 HR RANGE-  TEMP Temp  Av.7 °F (36.5 °C)  Min: 97.5 °F (36.4 °C)  Max: 98.2 °F (80.0 °C)   BP Systolic (84LNU), HMZ:162 , Min:101 , XNM:185      Diastolic (39JUS), GWD:43, Min:63, Max:87     PULSE Pulse  Av  Min: 50  Max: 109   RR Resp  Av  Min: 16  Max: 18   O2 SAT SpO2  Av.3 %  Min: 91 %  Max: 93 %   OXYGEN DELIVERY O2 Flow Rate (L/min)  Avg: 3 L/min  Min: 3 L/min  Max: 3 L/min     Systemic Examination:   Physical Exam  Head and neck atraumatic, normocephalic    Lymph nodes-no cervical, supraclavicular lymphadenopathy    Neck-no JVP elevation    Lungs - AP diameter of chest increased. Thoracic expansion and diaphragmatic excursion diminished. BS diminished and expiratory phase prolonged. No dullness to percussion or tenderness to palpation. No bronchial breath sounds . WHEEZE B/L     CVS- S1, S2 regular. No S3 no S4, no murmurs    Abdomen-nontender, nondistended. Bowel sounds are present. No organomegaly    Lower extremity-no edema    Upper extremity-no edema    Neurological-grossly normal cranial nerves.   No overt motor deficit        DATA REVIEW     Medications:  Scheduled Meds:   [START ON 2021] predniSONE  40 mg Oral Daily    insulin glargine  30 Units Subcutaneous QAM    insulin lispro  10 Units Subcutaneous TID WC    levoFLOXacin  500 mg Oral Daily    guaiFENesin  600 mg Oral BID    aspirin  81 mg Oral Daily    atorvastatin  40 mg Oral Daily    losartan  25 mg Oral Daily    budesonide-formoterol  2 puff Inhalation BID    pantoprazole  40 mg Oral QAM AC    traZODone  100 mg Oral Nightly    insulin lispro  0-12 Units Subcutaneous TID WC    insulin lispro  0-6 Units Subcutaneous Nightly    sodium chloride flush  5-40 mL Intravenous 2 times per day    enoxaparin  40 mg Subcutaneous Daily    ipratropium-albuterol  1 ampule Inhalation Q4H WA    insulin glargine  30 Units Subcutaneous QPM    nicotine  1 patch Transdermal Daily     Continuous Infusions:   dextrose      sodium chloride       LABS:-  ABG:   No results for input(s): POCPH, POCPCO2, POCPO2, POCHCO3, FKYL5SAM in the last 72 hours. CBC:   Recent Labs     06/14/21  1713 06/15/21  0537 06/16/21  0907 06/17/21  0521   WBC 15.9* 14.6* 21.7* 18.8*   HGB 15.0 14.0 14.9 13.0   HCT 45.4 42.7 45.0 39.7   MCV 90.1 91.7 91.5 92.2    309 360 292   LYMPHOPCT 14*  --   --   --    RBC 5.03 4.65 4.92 4.31   MCH 29.9 30.1 30.3 30.1   MCHC 33.1 32.8 33.1 32.7   RDW 12.9 13.1 13.3 13.3     BMP:   Recent Labs     06/15/21  0537 06/16/21  0907 06/17/21  0521    138 137   K 4.6 4.1 5.1    100 101   CO2 25 27 27   BUN 11 20 24*   CREATININE 0.47* 0.67 0.75   GLUCOSE 301* 386* 483*     Liver Function Test:   Recent Labs     06/14/21  1713   PROT 8.2   LABALBU 4.3   ALT 9   AST 10   ALKPHOS 133*   BILITOT 0.94     Amylase/Lipase:  No results for input(s): AMYLASE, LIPASE in the last 72 hours. Coagulation Profile:   No results for input(s): INR, PROTIME, APTT in the last 72 hours. Cardiac Enzymes:  No results for input(s): CKTOTAL, CKMB, CKMBINDEX, TROPONINI in the last 72 hours.   Lactic Acid:  Lab Results   Component Value Date    LACTA 2.1 05/07/2021     BNP:   No results found for: BNP  D-Dimer:  Lab Results   Component Value Date    DDIMER 0.46 06/14/2021     Others:   No results found for: TSH, S3URGUB, G8KEUFJ, THYROIDAB, FT3, T4FREE  Lab Results   Component Value Date    CRP 16.0 (H) 06/15/2021     No results found for: Abimael Flomot Results   Component Value Date    FERRITIN 481 (H) 06/15/2021     No results found for: SPEP, UPEP  No results found for: PSA, CEA, , SJ8228,     Input/Output:    Intake/Output Summary (Last 24 hours) at 6/17/2021 1300  Last data filed at 6/17/2021 0730  Gross per 24 hour   Intake 270 ml   Output 1255 ml   Net -985 ml       Microbiology:  Recent Labs     06/14/21  1713 06/14/21  2305 06/15/21  1615   SPECDESC  --  .Random Urine . NASOPHARYNGEAL SWAB   SPECIAL  --  NOT REPORTED  --    CULTURE  --  NO GROWTH  --    MPNEUM 0.33  --   --        Pathology:    Radiology reports:  XR CHEST PORTABLE   Final Result   Left basilar infiltrate consistent with pneumonia. CT CHEST W CONTRAST   Final Result   1. Patient has mild emphysematous changes and evidence of scarring in the   lungs. 2.  Scattered ground-glass opacities in both lung fields predominantly in the   lower lung zones. This may be on the basis of inflammatory process or   pneumonitis such as COVID-19 infection. 3.  No gross mediastinal adenopathy. 4.  Hepatic steatosis without focal mass lesion. RECOMMENDATIONS:   Correlation for possible COVID-19 infection. XR CHEST PORTABLE   Final Result   Small bilateral effusions with scattered bilateral pulmonary infiltrates   concerning for pneumonia. XR CHEST PORTABLE   Final Result   *Borderline cardiomegaly with mild perihilar interstitial prominence   suggesting vascular congestion. *No focal airspace consolidation or overt CHF. Echocardiogram:   No results found for this or any previous visit. Cardiac Catheterization:   No results found for this or any previous visit.       ASSESSMENT AND PLAN     Assessment:    // Bilateral VIRAL pneumonia   // Acute hypoxic respiratory failure  // Chronic hypoxic respiratory failure  // COPD exacerbation  // Obstructive sleep apnea intolerant/noncompliant with CPAP/BiPAP.  // Obesity  // Diabetes mellitus with hyperglycemia  // Tobacco dependence greater than 40-pack-year smoking.  // Chronic left pleural parenchymal change. Plan:  OK TO DC   LEVOQUIN FOR 7 DAYS   PREDNISONE TAPER   40 MG PO DAILY FOR 3 DAYS THEN 30 MG PO DAILY FOR 3 DAYS THEN 20 MG PO DAILY FOR 3 DAYS THEN 10 MG PO DAILY FOR 3 DAYS   HOME O2 EVAL   SMOKING CESSATION   CONTINUE SYMBICORT   CONTINUE DUONEB 4 TIMES A DAY AND AS WHEEZING AND BREATHING BETTER CAN USE PRN . CHEST XRAY AND FOLLOW UP IN PULMONARY CLINIC IN 6 WEEKS . OUT PT PFT , PSG              Ca Morgan MD, M.D. Pulmonary and Critical Care Medicine           6/17/2021, 1:00 PM    Please note that this chart was generated using voice recognition Dragon dictation software. Although every effort was made to ensure the accuracy of this automated transcription, some errors in transcription may have occurred.

## 2021-06-17 NOTE — PROGRESS NOTES
independent ; Increased time to complete (supervision/SBA when standing in shower to wash/rinse hair - Mod IND seated upright in bed to brush hair)  UE Bathing: Supervision;Stand by assistance (seated on shower to wash/rinse UEs and back)  LE Bathing: Supervision; Increased time to complete;Stand by assistance (seated on shower to wash/rinse LEs - standing for perineal/bottom hygiene)  UE Dressing: Supervision;Stand by assistance (standing in shower to don shirt)  LE Dressing: Stand by assistance;Supervision; Increased time to complete (thread and don underwear and pants)        Balance  Sitting Balance: Supervision (seated, with supervision, on shower chair during shower ~10 minutes overall)  Standing Balance: Stand by assistance (standing ~10 minutes overall in shower for UE/LE bathing and following for dressing tasks)  Standing Balance  Comment: verbal cues required for intermittent use of grab bars when standing in shower for bathing/dressing tasks for increased safety awareness/balance. Furthermore, pt edcuated on importance/benefit of use of shower chair upon discharge to promote increased activity tolerance to perform functional activties, pt hesistant to incorporate however verbalized understanding. Functional Mobility  Functional - Mobility Device: No device  Activity: To/from bathroom  Assist Level: Stand by assistance  Functional Mobility Comments: No true LOB observed this date. Bed mobility  Sit to Supine: Modified independent  Comment: Pt up in chair upon arrival.     Transfers  Sit to stand: Supervision  Stand to sit: Supervision  Transfer Comments: Pt denies any dizziness/light-headedness throughout session with multiple functional transfers within bathroom during showering.         Cognition  Overall Cognitive Status: Exceptions  Safety Judgement: Decreased awareness of need for assistance;Decreased awareness of need for safety  Problem Solving: Assistance required to identify errors made;Assistance required to correct errors made  Insights: Decreased awareness of deficits  Initiation: Does not require cues  Sequencing: Does not require cues     Plan   Plan  Times per week: 5-6x/week  Times per day: Daily  Current Treatment Recommendations: Safety Education & Training, Balance Training, Patient/Caregiver Education & Training, Self-Care / ADL, Functional Mobility Training, Endurance Training, Equipment Evaluation, Education, & procurement, Home Management Training    Goals  Short term goals  Time Frame for Short term goals: 10 visits  Short term goal 1: Pt will demo Mod IND, with DME as needed, to perform grooming/UB ADLs. Short term goal 2: Pt will demo Mod IND, with DME as needed, to perform toileting/LB ADLs. Short term goal 3: Pt will demo Mod IND, with LRD, to perform functional mobility/transfers during ADLs. Short term goal 4: Pt will tolerate 15+ minutes of dynamic standing balance during a functional tasks for increased participation in ADLs. Short term goal 5: Pt will tolerate 15+ minutes of activity tolerance to increase engagement in ADLs/IADLs and functional mobility. Short term goal 6: Pt will demo good safety awareness throughout participation in functional tasks, 100% of the time, for increased engagement during ADLs and functional mobility/transfers.        Therapy Time   Individual Concurrent Group Co-treatment   Time In 1415         Time Out 1456         Minutes 41         Timed Code Treatment Minutes: 2500 [facility], OTR/L

## 2021-06-17 NOTE — PROGRESS NOTES
Physical Therapy  Facility/Department: [facility] MED SURG ICU  Daily Treatment Note  NAME: Sergio Hassan  : 1970  MRN: [id number]    Date of Service: 2021    Discharge Recommendations:  Patient would benefit from continued therapy after discharge   PT Equipment Recommendations  Equipment Needed: No    Assessment   Body structures, Functions, Activity limitations: Decreased functional mobility ; Decreased strength;Decreased endurance;Decreased balance;Decreased posture  Assessment: Pt exhibits limitations in functional mobility secondary to deficits in endurance and balance. Pt requires SBA for transfers. Pt tolerated 450' of ambulation with CGA and Mello for LOB x3. Pt needed standing rest breaks throughout ambulation to maintain SpO2 >90% with 4L O2. Pt verbalized increased effort needed for respiration with minimal physical exertion. Pt would be expected to return to prior living situation safely. Pt would benefit from contiued therapy after d/c to improve high-level balance and endurance. PT Education: General Safety;Gait Training;Transfer Training;Energy Conservation; Functional Mobility Training  Patient Education: Pt educated on deep breathing technique and balance strategies. REQUIRES PT FOLLOW UP: Yes  Activity Tolerance  Activity Tolerance: Pt verbalized fatigue and increased effort needed for respiration with ambulation. Pt required seated rest breaks after balance activities and standing rest breaks (5 rest breaks need throughout 450' of ambulation, rest breaks ~1 minutes to restore SpO2 >90%)     Patient Diagnosis(es): The primary encounter diagnosis was COPD with acute bronchitis ([facility] [address] Diagnoses of Hypoxia and Hypoglycemia were also pertinent to this visit.      has a past medical history of ADD (attention deficit disorder), Arrhythmia, Asthma, Back pain, Bronchitis, Caffeine use, Cholelithiasis, COPD (chronic obstructive pulmonary disease) ([address] Depression, Diabetes mellitus ([facility] [address] Diabetes mellitus type 2, noninsulin dependent ([facility] [address] Diarrhea, GERD (gastroesophageal reflux disease), Hyperlipidemia, Kidney infection, Kidney stone, Kidney stones, Pneumonia, and Sleep apnea. has a past surgical history that includes  section ( & ); Cholecystectomy (); Hysterectomy; Colonoscopy (); Cystoscopy; Lithotripsy; Hysterectomy, total abdominal (); Tubal ligation (); Tonsillectomy and adenoidectomy (); and Hand surgery. Restrictions  Restrictions/Precautions  Restrictions/Precautions: Fall Risk, Up as Tolerated  Required Braces or Orthoses?: No  Position Activity Restriction  Other position/activity restrictions: 3L O2 via nasal cannula at rest, 4L O2 with exertion  Subjective   General  Chart Reviewed: No  Response To Previous Treatment: Patient with no complaints from previous session. Family / Caregiver Present: No  Subjective  Subjective: Pt and RN agreeable to therapy. Pt denies pain and states its hard to tell whether her breathing has improved in the morning. Pain Screening  Patient Currently in Pain: Denies  Vital Signs  Patient Currently in Pain: Denies       Orientation  Orientation  Overall Orientation Status: Within Functional Limits  Cognition   Cognition  Overall Cognitive Status: WFL  Objective   Bed mobility  Supine to Sit: Stand by assistance  Sit to Supine: Stand by assistance  Scooting: Supervision  Comment: Pt supine in bed upon arrival and retired to bed at end of session. Bed mobility assessed with HOB elevated ~30 degrees. Transfers  Sit to Stand: Stand by assistance  Stand to sit: Stand by assistance  Comment: Transfers assessed without AD. STS x5 for seated rest breaks between ambulation and high level balance activities. Pt's SpO2 remained at 90% on 3L of O2 when initial sit > stand transfer was completed. Ambulation  Ambulation?: Yes  More Ambulation?: No  Ambulation 1  Surface: level tile  Device: No Device; Elex Tiffanie rajesus Miller rail used for ~50% of ambulation distance)  Other Apparatus: O2 (4L)  Assistance: Contact guard assistance;Minimal assistance (Mello needed to recover balance)  Quality of Gait: increased VIVIANA, decreased step length, decreased ludwig, mildly unsteady  Gait Deviations: Slow Ludwig; Increased VIVIANA; Decreased step length  Distance: 450' x2  Comments: Pt exhibited desaturation to 84% with initial 79' of ambulation on 3L O2. Oxygen was increased to 4L O2 to maintain SpO2 >90%, per respiratory therapist. Pt's SpO2 ranged from 88-96% throughout ambulation with 4L O2, SpO2 recovered when <90% with standing rest breaks and deep breathing techniques. Pt had mild LOB x3 recovered with stepping strategy, use of espinoza rail and Mello. Pt was offered use of cane or walker prior and pt declined stating \"I'm not that old. \"  Stairs/Curb  Stairs?: No     Balance  Posture: Fair  Sitting - Static: Fair;+  Sitting - Dynamic: Fair;+  Standing - Static: Fair;-  Standing - Dynamic: Fair;-  Comments: Standing balance assessed without AD. Pt exhibited moderate unsteadiness with high-level balance acivities and needed Mello for recovery of balance. Romberg/Narrow Base of Support  Eyes Open:  (30 sec x 3; CGA throughout, no LOB)  Sway: Moderate  Strategy: Hip (Hip and ankle strategies used throughout)  Sharpened Romberg/Tandem  Eyes Open:  (30 sec x 3 - R and L; CGA throughout, Mello for correction of LOB 2x per 30 sec bout)  Sway: Moderate  Strategy:  (Hip and ankle strategy used throughout, stepping strategy used for moderate LOB)    Goals  Short term goals  Time Frame for Short term goals: 14 visits  Short term goal 1: Pt ambulates 200' independently with self-recognition and initiation of rest breaks and deep breathing are needed to improve mobility. Short term goal 2: Pt navigates 4 stairs independently without rails to facilitate return to prior living situation.   Short term goal 3: Pt improves standing balance to good to decrease risk of falls.  Short term goal 4: Pt tolerates 30 minutes of therapy to aid endurance with functional mobility. Patient Goals   Patient goals : Pt would like to return home    Plan    Plan  Times per week: 5-6x  Times per day: Daily  Current Treatment Recommendations: Strengthening, ROM, Balance Training, Functional Mobility Training, Transfer Training, Stair training, Gait Training, Endurance Training, Neuromuscular Re-education, Safety Education & Training, Home Exercise Program  Safety Devices  Type of devices: Gait belt, Left in bed, Nurse notified, Call light within reach  Restraints  Initially in place: No     Therapy Time   Individual Concurrent Group Co-treatment   Time In 9041         Time Out 0916         Minutes 38         Timed Code Treatment Minutes: 38 Minutes       DAMIAN Gutierrez  Evaluation/treatment performed by Student PT under the supervision of co-signing PT who agrees with all evaluation/treatment and documentation.

## 2021-06-17 NOTE — PLAN OF CARE
Problem: Airway Clearance - Ineffective:  Goal: Ability to maintain a clear airway will improve  Description: Ability to maintain a clear airway will improve  Outcome: Ongoing   Pt. Encouraged to cough & deep breathe this shift. Problem: Activity Intolerance:  Goal: Ability to tolerate increased activity will improve  Description: Ability to tolerate increased activity will improve  Outcome: Ongoing   Tolerance of activity noted to improve this shift. Problem: Tobacco Use:  Goal: Inpatient tobacco use cessation counseling participation  Description: Inpatient tobacco use cessation counseling participation  Outcome: Ongoing   Pt. Compliant with use of nicotine patch this shift.    Problem: Falls - Risk of:  Goal: Will remain free from falls  Description: Will remain free from falls  Outcome: Ongoing   No falls/injuries this shift, bed in lowest position, brakes on, bed alarm on, call light in reach, side rails up x2

## 2021-06-17 NOTE — PLAN OF CARE
Patient was evaluated today for the diagnosis of COPD. I entered a DME order for home oxygen because the diagnosis and testing requires the patient to have supplemental oxygen. Condition will improve or be benefited by oxygen use. The patient is  able to perform good mobility in a home setting and therefore does require the use of a portable oxygen system. The need for this equipment was discussed with the patient and she understands and is in agreement. Tanda Carrel was evaluated today and a DME order was entered for a nebulizer compressor in order to administer duoneb due to the diagnosis of COPD. The need for this equipment and treatment was discussed with the patient and she understands and is in agreement.

## 2021-06-17 NOTE — PROGRESS NOTES
[facility]  Office: [address], DO, Caroline Boston, DO, Eunice Lara, DO, Yue Driscoll Blood, DO, Elly Boggs MD, Jeremiah Kam MD, Kayce Nguyen MD, Petra Chavez MD, Jiles Alpers, MD, Nusrat Amaya MD, Eduardo Nicole MD, No Simms MD, Mya Glasgow, DO, Anthony Daniels MD, Barbara Swanson DO, Allan Phillips MD,  Camille Lu DO, Mauricio Leija MD, Ganesh Roblero MD, Arti Vazquez MD, Dana Arita MD, Deepali Rios, David Rinaldi, CNP, July Labjraret, CNP, Soco May, CNS, Juliana Figueroa, CNP, Romeo Velasco, CNP, Sebastian Mins, CNP, Carson Grfélix, CNP, Caitlin Linton, CNP, Alvaro Amos PA-C, Xiang Matt, [place], Analia Taylor, CNP, Víctor Boles, CNP, Yulia Kirk, CNP, Gigi Brand, CNP, Travis Page, CNP, Chari Renae 0692    Progress Note    6/17/2021    8:17 AM    Name:   Pilo Arteaga  MRN:     [id number]     Acct:      [de-identified]   Room:   [address] [place] [address] Day:  3  Admit Date:  6/14/2021  4:59 PM    PCP:   Lawyer Manny MD  Code Status:  Full Code    Subjective:     C/C:   Chief Complaint   Patient presents with    Shortness of Breath     2 DAYS OF SHORTNESS OF BREATH AND OUT OF INHALER. PT REPORTS USING DAUGHTERS NEBULIZER MACHINE AND MEDS. HX OF COUGH     Interval History Status: improved. No sob at rest but she does get winded with activity. She states she is coughing more but nothing is clearing. She does feel like things are breaking up. Oxygen down to 3L. Lasix 20 mg IV for fine Bibasilar crackles. I increased her morning Lantus to 30 units and increased her scheduled insulin to 10 units. I also transitioned her solumedrol to prednisone. Rapid Covid negative  Urine Legionella antigen is negative  respiratory panel is positive for rhino/enterovirus. Mycoplasma IgM pending    Brief History:     Pilo Arteaga is a 44 y.o. Unavailable/unknown female who presents with Shortness of Breath (2 DAYS OF SHORTNESS OF BREATH AND OUT OF INHALER. PT REPORTS USING DAUGHTERS NEBULIZER MACHINE AND MEDS. HX OF COUGH)   and is admitted to the hospital for the management of Acute exacerbation of chronic obstructive pulmonary disease ([facility] [address]    Patient states her symptoms started a couple of days ago. She says initially she had nasal congestion, and earache and sore throat with chills and diaphoresis and a dry cough. She says yesterday she developed shortness of breath earlier in the day and then later felt like she could not breathe. She says yesterday she stayed with her daughter to use her daughter's home nebulizer because her meds have run out. She does report a history of ATUL and COPD but she is not compliant with her medications or her CPAP. She also has a history of diabetes but says her diabetes is under control so she stopped taking her medications until April when she had a hemoglobin A1c of 10.2. She says at that time she was started back on diabetic medications.     A1c's available in my chart  10. 2High  7.3High  9.7High  9.6High  9.1High       Her urine is showing few bacteria and trace hemoglobin with positive nitrates. She states she often has UTIs without urinary symptoms.     Per the ER doctor her vitals initially showed tachycardia, tachypnea and hypoxia and she appeared uncomfortable (pulse ox was 70% on room air upon arrival to the ER) patient received breathing treatments, Solu-Medrol and magnesium. Per the ER documents her symptoms did somewhat improve.     Patient denies a history of home O2 use. For any documentation she was initially placed on 3 L and then it was increased to 6 L nasal cannula and ultimately she was placed on BiPAP overnight and 35 to 40% FiO2. During my assessment she is on 6 L nasal cannula.   She is able to carry on a conversation with me without becoming profoundly tachypneic or hypoxic but when she is up to the bathroom her sats do drop into the 80s. Plan to change her prednisone p.o. to Solu-Medrol, continue duo nebs as ordered and consult pulmonary medicine. Continue Rocephin to cover for UTI  Continue doxycycline for possible pneumonia  Lasix 40 mg IV x1. Patient has crackles on assessment     Pulmonary following. Per their recommendation  Continue IV Solu-Medrol but decrease to 40 mg every 12 hours and then transition to prednisone taper dose. He suggests pulmonary follow-up for pulmonary function tests in approximately 6 weeks smoking cessation and outpatient sleep/CPAP titration study      Review of Systems:     Review of Systems   Constitutional: Negative for chills, diaphoresis and fever. HENT: Negative for congestion. Eyes: Negative for visual disturbance. Respiratory: Positive for cough. Negative for chest tightness, shortness of breath and wheezing. Cardiovascular: Negative for chest pain, palpitations and leg swelling. Gastrointestinal: Negative for abdominal pain, blood in stool, constipation, diarrhea, nausea and vomiting. Genitourinary: Negative for difficulty urinating. Neurological: Negative for dizziness, weakness, light-headedness, numbness and headaches. All other systems reviewed and are negative. Medications: Allergies:     Allergies   Allergen Reactions    Zithromax [Azithromycin] Hives       Current Meds:   Scheduled Meds:    [START ON 6/18/2021] predniSONE  40 mg Oral Daily    insulin glargine  30 Units Subcutaneous QAM    insulin lispro  10 Units Subcutaneous TID WC    insulin glargine  20 Units Subcutaneous Once    levoFLOXacin  500 mg Oral Daily    guaiFENesin  600 mg Oral BID    aspirin  81 mg Oral Daily    atorvastatin  40 mg Oral Daily    losartan  25 mg Oral Daily    budesonide-formoterol  2 puff Inhalation BID    pantoprazole  40 mg Oral QAM AC    traZODone  100 mg Oral Nightly    insulin lispro  0-12 Units Subcutaneous TID WC  insulin lispro  0-6 Units Subcutaneous Nightly    sodium chloride flush  5-40 mL Intravenous 2 times per day    enoxaparin  40 mg Subcutaneous Daily    ipratropium-albuterol  1 ampule Inhalation Q4H WA    insulin glargine  30 Units Subcutaneous QPM    nicotine  1 patch Transdermal Daily     Continuous Infusions:    dextrose      sodium chloride       PRN Meds: benzonatate, sodium chloride flush, ALPRAZolam, clonazePAM, glucose, dextrose, glucagon (rDNA), dextrose, sodium chloride flush, sodium chloride, ondansetron **OR** ondansetron, polyethylene glycol, acetaminophen **OR** acetaminophen, albuterol    Data:     Past Medical History:   has a past medical history of ADD (attention deficit disorder), Arrhythmia, Asthma, Back pain, Bronchitis, Caffeine use, Cholelithiasis, COPD (chronic obstructive pulmonary disease) ([facility] [address] Depression, Diabetes mellitus ([facility] [address] Diabetes mellitus type 2, noninsulin dependent ([facility] [address] Diarrhea, GERD (gastroesophageal reflux disease), Hyperlipidemia, Kidney infection, Kidney stone, Kidney stones, Pneumonia, and Sleep apnea. Social History:   reports that she has been smoking. She has a 35.00 pack-year smoking history. She has never used smokeless tobacco. She reports current alcohol use. She reports current drug use. Drug: Marijuana.      Family History:   Family History   Problem Relation Age of Onset    No Known Problems Mother     No Known Problems Father     Heart Disease Maternal Grandmother     Diabetes Maternal Grandmother     Heart Disease Maternal Grandfather     Diabetes Maternal Grandfather        Vitals:  /75   Pulse 78   Temp 97.5 °F (36.4 °C) (Oral)   Resp 16   Ht 5' 5\" (1.651 m)   Wt 230 lb 9.6 oz (104.6 kg)   SpO2 91%   BMI 38.37 kg/m²   Temp (24hrs), Av.8 °F (36.6 °C), Min:97.5 °F (36.4 °C), Max:98.2 °F (36.8 °C)    Recent Labs     21  0755 21  1112 21  1642 21  1922   POCGLU 355* 337* 330* 334*       I/O (24Hr):     Intake/Output Summary (Last 24 hours) at 6/17/2021 0817  Last data filed at 6/17/2021 0730  Gross per 24 hour   Intake 270 ml   Output 1255 ml   Net -985 ml       Labs:  Hematology:  Recent Labs     06/14/21  1713 06/15/21  0537 06/16/21  0907 06/17/21  0521   WBC 15.9* 14.6* 21.7* 18.8*   RBC 5.03 4.65 4.92 4.31   HGB 15.0 14.0 14.9 13.0   HCT 45.4 42.7 45.0 39.7   MCV 90.1 91.7 91.5 92.2   MCH 29.9 30.1 30.3 30.1   MCHC 33.1 32.8 33.1 32.7   RDW 12.9 13.1 13.3 13.3    309 360 292   MPV 6.6 6.5 6.8 7.1   CRP  --  16.0*  --   --    DDIMER 0.46  --   --   --      Chemistry:  Recent Labs     06/14/21  1713 06/14/21  2224 06/15/21  0537 06/16/21  0907 06/17/21  0521     --  136 138 137   K 3.8  --  4.6 4.1 5.1   CL 99  --  104 100 101   CO2 24  --  25 27 27   GLUCOSE 245*  --  301* 386* 483*   BUN 9  --  11 20 24*   CREATININE 0.49*  --  0.47* 0.67 0.75   MG 1.8  --   --  2.2  --    ANIONGAP 14  --  7* 11 9   LABGLOM >60  --  >60 >60 >60   GFRAA >60  --  >60 >60 >60   CALCIUM 9.6  --  9.1 9.7 9.6   PROBNP  --  390*  --   --   --    TROPHS 10  --   --   --   --      Recent Labs     06/14/21  1713 06/15/21  0537 06/15/21  1707 06/15/21  2028 06/16/21  0755 06/16/21  1112 06/16/21  1642 06/16/21  1922   PROT 8.2  --   --   --   --   --   --   --    LABALBU 4.3  --   --   --   --   --   --   --    LABA1C 9.8*  --   --   --   --   --   --   --    AST 10  --   --   --   --   --   --   --    ALT 9  --   --   --   --   --   --   --    LDH  --  268*  --   --   --   --   --   --    ALKPHOS 133*  --   --   --   --   --   --   --    BILITOT 0.94  --   --   --   --   --   --   --    POCGLU  --   --  458* 313* 355* 337* 330* 334*     ABG:No results found for: POCPH, PHART, PH, POCPCO2, POR9WLG, PCO2, POCPO2, PO2ART, PO2, POCHCO3, DZX1WCV, HCO3, NBEA, PBEA, BEART, BE, THGBART, THB, JPD7GJF, PIHX2NVE, X9SWVROO, O2SAT, FIO2  Lab Results   Component Value Date/Time    SPECIAL NOT REPORTED 06/14/2021 11:05 PM Lab Results   Component Value Date/Time    CULTURE NO GROWTH 06/14/2021 11:05 PM       Radiology:  CT CHEST W CONTRAST    Result Date: 6/15/2021  1. Patient has mild emphysematous changes and evidence of scarring in the lungs. 2.  Scattered ground-glass opacities in both lung fields predominantly in the lower lung zones. This may be on the basis of inflammatory process or pneumonitis such as COVID-19 infection. 3.  No gross mediastinal adenopathy. 4.  Hepatic steatosis without focal mass lesion. RECOMMENDATIONS: Correlation for possible COVID-19 infection. XR CHEST PORTABLE    Result Date: 6/15/2021  Small bilateral effusions with scattered bilateral pulmonary infiltrates concerning for pneumonia. XR CHEST PORTABLE    Result Date: 6/14/2021  *Borderline cardiomegaly with mild perihilar interstitial prominence suggesting vascular congestion. *No focal airspace consolidation or overt CHF. Physical Examination:     Physical Exam  Vitals and nursing note reviewed. Constitutional:       Appearance: Normal appearance. HENT:      Mouth/Throat:      Mouth: Mucous membranes are moist.   Eyes:      Extraocular Movements: Extraocular movements intact. Cardiovascular:      Rate and Rhythm: Normal rate and regular rhythm. Pulses: Normal pulses. Heart sounds: Normal heart sounds. Pulmonary:      Effort: Pulmonary effort is normal.      Breath sounds: Examination of the right-upper field reveals wheezing. Examination of the left-upper field reveals wheezing. Examination of the right-lower field reveals rales. Examination of the left-lower field reveals rales. Wheezing and rales present. Abdominal:      General: Bowel sounds are normal.      Palpations: Abdomen is soft. Musculoskeletal:         General: Normal range of motion. Cervical back: Neck supple. Right lower leg: No edema. Left lower leg: No edema. Skin:     General: Skin is warm and dry.       Capillary Refill: Capillary refill takes less than 2 seconds. Neurological:      Mental Status: She is alert and oriented to person, place, and time. Psychiatric:         Mood and Affect: Mood normal.         Assessment:     Hospital Problems         Last Modified POA    * (Principal) Acute hypoxemic respiratory failure ([address] 6/15/2021 Yes    Acute exacerbation of chronic obstructive pulmonary disease ([address] 6/15/2021 Yes    Hypoxia 6/15/2021 Yes    Bronchitis 6/15/2021 Yes    Type 2 diabetes mellitus with hyperglycemia, with long-term current use of insulin ([address] 6/15/2021 Yes    Obesity (BMI 30-39.9) 6/15/2021 Yes    Tobacco abuse 6/15/2021 Yes    ATUL (obstructive sleep apnea) 6/15/2021 Yes    Overview Signed 6/15/2021 10:59 AM by MAXIMINO Thompson CNP     Not compliant           COPD with acute bronchitis ([address] 6/15/2021 Yes    Hypoglycemia 6/15/2021 Yes          Plan:     Acute COPD exacerbation/hypoxia; pulmonary following oxygen to maintain sat greater than 90%, respiratory treatments as ordered, prednisone, BiPAP as needed. Patient encouraged to follow-up outpatient for new sleep study with compliance. Respiratory culture needed. Mucinex and tessalon     Miralax     Obesity; weight management and lifestyle modification per PCP outpatient     Tobacco abuse; nicotine patch. Encourage smoking cessation     Type 2 diabetes with hyperglycemia; adjusting scheduled insulin and Lantus r/t hyperglycemia. Diabetic diet. A1c 9.8.   Encourage  diabetic compliance and follow-up with diabetic educator outpatient     DVT and GI prophylaxis     Resume home Norvasc, Lipitor, as needed Xanax and Klonopin    MAXIMINO Thompson - CNP  6/17/2021  8:17 AM

## 2021-06-17 NOTE — PROGRESS NOTES
RT in for Aerosol therapy . Pt sitting in Lazy boy chair appears to be dozing . 02 at 3lpm continues . Spot check revals a sat of 92% . Breath sounds persistent wheezes thru out . Frequent congested hacky cough noted . Pt encouraged to medical devices provided to her , to help manage secretions .

## 2021-06-17 NOTE — DISCHARGE INSTR - COC
Continuity of Care Form    Patient Name: Grace Romero   :  1970  MRN:  [id number]    Admit date:  2021  Discharge date:  ***    Code Status Order: Full Code   Advance Directives:   Advance Care Flowsheet Documentation       Date/Time Healthcare Directive Type of Healthcare Directive Copy in [address] Agent's Name Healthcare Agent's Phone Number    21 [phone]  Yes, patient has an advance directive for healthcare treatment  Living will;Durable power of  for health care  No, copy requested from family  --  --  --            Admitting Physician:  Maria Ines Perez MD  PCP: Jemal Tirado MD    Discharging Nurse: [facility] Unit/Room#: 325/325-01  Discharging Unit Phone Number: ***    Emergency Contact:   Extended Emergency Contact Information  Primary Emergency Contact: Ashok Garcia  Address: [address] [place] [address]. [place]  Mobile Phone: [phone]  Relation: Spouse    Past Surgical History:  Past Surgical History:   Procedure Laterality Date    [address]    COLONOSCOPY  2016    CYSTOSCOPY      W/ stent placement    HAND SURGERY      HYSTERECTOMY      HYSTERECTOMY, TOTAL ABDOMINAL  2005    LITHOTRIPSY      [address] [place]       Immunization History: There is no immunization history on file for this patient. Active Problems:  Patient Active Problem List   Diagnosis Code    Bilateral nephrolithiasis N20.0    Frequency of urination R35.0    Acute exacerbation of chronic obstructive pulmonary disease (HCC) J44.1    Hypoxia R09.02    Bronchitis J40    Type 2 diabetes mellitus with hyperglycemia, with long-term current use of insulin (HCC) E11.65, Z79.4    Obesity (BMI 30-39. 9) E66.9    Tobacco abuse Z72.0    ATUL (obstructive sleep apnea) G47.33    Acute hypoxemic respiratory failure (HCC) J96.01 COPD with acute bronchitis (HCC) J44.0, J20.9    Hypoglycemia E16.2       Isolation/Infection:   Isolation            No Isolation          Patient Infection Status       Infection Onset Added Last Indicated Last Indicated By Review Planned Expiration Resolved Resolved By    None active    Resolved    COVID-19 Rule Out 06/15/21 06/15/21 06/15/21 COVID-19, Rapid (Ordered)   06/15/21 Rule-Out Test Resulted            Nurse Assessment:  Last Vital Signs: /63   Pulse 65   Temp 97.5 °F (36.4 °C) (Oral)   Resp 18   Ht 5' 5\" (1.651 m)   Wt 234 lb 9.1 oz (106.4 kg)   SpO2 92%   BMI 39.03 kg/m²     Last documented pain score (0-10 scale): Pain Level: 0  Last Weight:   Wt Readings from Last 1 Encounters:   21 234 lb 9.1 oz (106.4 kg)     Mental Status:  {IP PT MENTAL STATUS::::0}    IV Access:  { SAAD IV ACCESS:775053079:::0}    Nursing Mobility/ADLs:  Walking   {CHP DME ADLs:964002412:::0}  Transfer  {CHP DME ADLs:314240680:::0}  Bathing  {CHP DME ADLs:357761339:::0}  Dressing  {CHP DME ADLs:093776950:::0}  Toileting  {CHP DME ADLs:614981974:::0}  Feeding  {CHP DME ADLs:688855734:::0}  Med Admin  {CHP DME ADLs:007320845:::0}  Med Delivery   { SAAD MED Delivery:512443600:::0}    Wound Care Documentation and Therapy:        Elimination:  Continence: Bowel: {YES / H}  Bladder: {YES / NZ:79992}  Urinary Catheter: {Urinary Catheter:544063795:::0}   Colostomy/Ileostomy/Ileal Conduit: {YES / MARTINEZ:43144}       Date of Last BM: ***    Intake/Output Summary (Last 24 hours) at 2021 1450  Last data filed at 2021 0730  Gross per 24 hour   Intake 270 ml   Output 1255 ml   Net -985 ml     I/O last 3 completed shifts: In: 270 [P.O.:250;  I.V.:20]  Out: 655 [Urine:655]    Safety Concerns:     [address] SAAD Safety Concerns:076134362:::0}    Impairments/Disabilities:      [address] NASH Impairments/Disabilities:486826506:::0}    Nutrition Therapy:  Current Nutrition Therapy:   [address] NASH Diet KZPY:837737116:::9}    Routes of Feeding: {CHP DME Other Feedings:419833589:::0}  Liquids: {Slp liquid thickness:53217}  Daily Fluid Restriction: {CHP DME Yes amt example:896726816:::0}  Last Modified Barium Swallow with Video (Video Swallowing Test): {Done Not Done KLYB:246646640:::8}    Treatments at the Time of Hospital Discharge:   Respiratory Treatments: ***  Oxygen Therapy:  {Therapy; copd oxygen:53776:::0}  Ventilator:    {[facility] Vent List:471627549:::0}    Rehab Therapies: {THERAPEUTIC INTERVENTION:3053087059}  Weight Bearing Status/Restrictions: {[facility] Weight Bearin:::0}  Other Medical Equipment (for information only, NOT a DME order):  {EQUIPMENT:166592098}  Other Treatments: ***    Patient's personal belongings (please select all that are sent with patient):  {CHP DME Belongings:931465948:::0}    RN SIGNATURE:  {Esignature:810418642:::0}    CASE MANAGEMENT/SOCIAL WORK SECTION    Inpatient Status Date: ***    Readmission Risk Assessment Score:  Readmission Risk              Risk of Unplanned Readmission:  17           Discharging to Facility/ Agency   Name:   Address:  Phone:  Fax:    Dialysis Facility (if applicable)   Name:  Address:  Dialysis Schedule:  Phone:  Fax:    / signature: {Esignature:627002231:::0}    PHYSICIAN SECTION    Prognosis: Guarded    Condition at Discharge: Stable    Rehab Potential (if transferring to Rehab): Fair    Recommended Labs or Other Treatments After Discharge: PT, OT, nursing evaluation and treatment, requires 3L of oxygen. Follow up with Pulmonology and PCP as outpateint. Physician Certification: I certify the above information and transfer of Tanda Carrel  is necessary for the continuing treatment of the diagnosis listed and that she requires Home Care for less 30 days.      Update Admission H&P: No change in H&P    PHYSICIAN SIGNATURE:  Electronically signed by Santiago Hurst MD on 21 at 2:51 PM EDT

## 2021-06-17 NOTE — DISCHARGE SUMMARY
[facility]  Office: [address], DO, Swanson Darylpaulino, DO, Jose Manuel Shown, DO, Travis Ruiz Blood, DO, Jaime Ruiz MD, Matthew Ruvalcaba MD, Rogelio Urias MD, Daniela Loredo MD, Francisco Javier Tay MD, Neva Avery MD, Amrita Morales MD, Sharon Pierre MD, Subha Byrd, DO, Shaylee Mckeon MD, Elia Alba, DO, Neris Hanna MD,  Juan Pablo Calzada, DO, Melanie Dickey MD, Miesha Oh MD, Karla Hopkins MD, Cosmo Perry MD, Rolanda Roche, Trevor Boyd, CNP, Dimitrios Hicks, CNP, Scarlett Stockton, CNS, Renzo Leonardo, CNP, Duane Gu, CNP, Iain Triana, CNP, Romy Mir, CNP, Gladys Holt, CNP, Suzi Clayton PA-C, Abdirahman Wilson, [facility], Carmen Riggs, CNP, Estiven Nascimento, CNP, Sho Coronado, CNP, Raya Borrero, CNP, Celina Mittal, CNP, [address]    Discharge Summary     Patient ID: Radha Lei  :  1970   MRN: [id number]     ACCOUNT:  [de-identified]   Patient's PCP: Laura Mondragon MD  Admit Date: 2021   Discharge Date: 2021     Length of Stay: 3  Code Status:  Full Code  Admitting Physician: Daniela Loredo MD  Discharge Physician: Neris Hanna MD     Active Discharge Diagnoses:     Hospital Problem Lists:  Principal Problem:    Acute hypoxemic respiratory failure ([facility] [address]  Active Problems:    Acute exacerbation of chronic obstructive pulmonary disease ([facility] [address]    Hypoxia    Bronchitis    Type 2 diabetes mellitus with hyperglycemia, with long-term current use of insulin (HCC)    Obesity (BMI 30-39. 9)    Tobacco abuse    ATUL (obstructive sleep apnea)    COPD with acute bronchitis (HCC)    Hypoglycemia  Resolved Problems:    * No resolved hospital problems.  *      Admission Condition:  stable     Discharged Condition: stable    Hospital Stay:     Hospital Course:  Radha Lei is a 44 y.o. female who was admitted for the management of  Acute hypoxemic respiratory failure ([address] , presented to ER with Shortness of Breath (2 DAYS OF SHORTNESS OF BREATH AND OUT OF INHALER. PT REPORTS USING DAUGHTERS NEBULIZER MACHINE AND MEDS. HX OF COUGH)    Patient found to have Rhinovirus with COPD exacerbation. Patient was started on IV solumedrol transitioned to PO prednisone taper and levaquin. Patient's Blood sugars were elevated and was started on lantus 30 units BID and Lispro with meals. Patient to follow up with PCP and pulmonology as outpatient. Encouraged to stop smoking      Significant therapeutic interventions: see above    Significant Diagnostic Studies:   Labs / Micro:  CBC:   Lab Results   Component Value Date    WBC 18.8 06/17/2021    RBC 4.31 06/17/2021    HGB 13.0 06/17/2021    HCT 39.7 06/17/2021    MCV 92.2 06/17/2021    MCH 30.1 06/17/2021    MCHC 32.7 06/17/2021    RDW 13.3 06/17/2021     06/17/2021     BMP:    Lab Results   Component Value Date    GLUCOSE 483 06/17/2021     06/17/2021    K 5.1 06/17/2021     06/17/2021    CO2 27 06/17/2021    ANIONGAP 9 06/17/2021    BUN 24 06/17/2021    CREATININE 0.75 06/17/2021    BUNCRER NOT REPORTED 06/17/2021    CALCIUM 9.6 06/17/2021    LABGLOM >60 06/17/2021    GFRAA >60 06/17/2021    GFR      06/17/2021    GFR NOT REPORTED 06/17/2021        Radiology:  CT CHEST W CONTRAST    Result Date: 6/15/2021  1. Patient has mild emphysematous changes and evidence of scarring in the lungs. 2.  Scattered ground-glass opacities in both lung fields predominantly in the lower lung zones. This may be on the basis of inflammatory process or pneumonitis such as COVID-19 infection. 3.  No gross mediastinal adenopathy. 4.  Hepatic steatosis without focal mass lesion. RECOMMENDATIONS: Correlation for possible COVID-19 infection. XR CHEST PORTABLE    Result Date: 6/17/2021  Left basilar infiltrate consistent with pneumonia.      XR CHEST PORTABLE    Result Date: 6/15/2021  Small bilateral effusions with scattered bilateral pulmonary infiltrates concerning for pneumonia. XR CHEST PORTABLE    Result Date: 6/14/2021  *Borderline cardiomegaly with mild perihilar interstitial prominence suggesting vascular congestion. *No focal airspace consolidation or overt CHF. Consultations:    Consults:     Final Specialist Recommendations/Findings:   IP CONSULT TO PULMONOLOGY      The patient was seen and examined on day of discharge and this discharge summary is in conjunction with any daily progress note from day of discharge. Discharge plan:     Disposition: Home    Physician Follow Up:     No follow-up provider specified. Requiring Further Evaluation/Follow Up POST HOSPITALIZATION/Incidental Findings: Diabetes education, follow up PCP, follow up pulmology as outpateint. PFT, sleep study ordered as outpatient as well as chest x-ray    Diet: diabetic diet    Activity: As tolerated    Instructions to Patient: please take your medications as ordered. Please check your sugars. You have blood work to monitor your kidneys. You have an order for a chest x-ray to monitor your lung function. You have also been given a script to monitor your lung function in about 6 weeks. You are requiring oxygen at this time. You are also given 2 scripts to monitor for sleep apnea and possible CPAP titration if you qualify.     Discharge Medications:      Medication List      START taking these medications    aspirin 81 MG chewable tablet  Take 1 tablet by mouth daily  Start taking on: June 18, 2021     benzonatate 100 MG capsule  Commonly known as: TESSALON  Take 1 capsule by mouth 3 times daily as needed for Cough     budesonide-formoterol 160-4.5 MCG/ACT Aero  Commonly known as: SYMBICORT  Inhale 2 puffs into the lungs 2 times daily     guaiFENesin 600 MG extended release tablet  Commonly known as: MUCINEX  Take 1 tablet by mouth 2 times daily for 7 days     insulin lispro (1 Unit Dial) 100 UNIT/ML Sopn  Commonly known as: HumaLOG KwikPen  Inject 3 Units into the skin 3 times daily (before meals)     ipratropium-albuterol 0.5-2.5 (3) MG/3ML Soln nebulizer solution  Commonly known as: DUONEB  Inhale 3 mLs into the lungs every 6 hours as needed for Shortness of Breath     Lantus SoloStar 100 UNIT/ML injection pen  Generic drug: insulin glargine  Inject 30 Units into the skin 2 times daily     levoFLOXacin 500 MG tablet  Commonly known as: LEVAQUIN  Take 1 tablet by mouth daily for 6 days  Start taking on: June 18, 2021     losartan 25 MG tablet  Commonly known as: COZAAR  Take 0.5 tablets by mouth daily  Start taking on: June 18, 2021     [employer] Pen Needles 31G X 6 MM Misc  Generic drug: Insulin Pen Needle  1 each by Does not apply route daily     nicotine 21 MG/24HR  Commonly known as: NICODERM CQ  Place 1 patch onto the skin every 24 hours     predniSONE 10 MG tablet  Commonly known as: DELTASONE  Take 4 tablets by mouth daily for 3 days, THEN 3 tablets daily for 3 days, THEN 2 tablets daily for 3 days, THEN 1 tablet daily for 3 days.   Start taking on: June 17, 2021        CHANGE how you take these medications    atorvastatin 40 MG tablet  Commonly known as: LIPITOR  Take 1 tablet by mouth daily  Start taking on: June 18, 2021  What changed:   medication strength  how much to take        CONTINUE taking these medications    ALPRAZolam 0.25 MG tablet  Commonly known as: XANAX     AMLODIPINE BESYLATE PO     clonazePAM 1 MG tablet  Commonly known as: KLONOPIN     ProAir  (90 Base) MCG/ACT inhaler  Generic drug: albuterol sulfate HFA     traZODone 50 MG tablet  Commonly known as: DESYREL        STOP taking these medications    lamoTRIgine 100 MG tablet  Commonly known as: LAMICTAL     LEVEMIR SC     NOVOLOG SC           Where to Get Your Medications      These medications were sent to [address] [place] [address] [phone]  [address] [place] [address]    Phone: [phone]   aspirin 81 MG chewable tablet  benzonatate 100 MG capsule  budesonide-formoterol 160-4.5 MCG/ACT Aero  guaiFENesin 600 MG extended release tablet  ipratropium-albuterol 0.5-2.5 (3) MG/3ML Soln nebulizer solution  levoFLOXacin 500 MG tablet  nicotine 21 MG/24HR  predniSONE 10 MG tablet     These medications [address]    Phone: [phone]   atorvastatin 40 MG tablet  insulin lispro (1 Unit Dial) 100 UNIT/ML Sopn  Lantus SoloStar 100 UNIT/ML injection pen  losartan 25 MG tablet  [employer] Pen Needles 31G X 6 MM Misc         Discharge Procedure Orders   Alcohol prep pad     XR CHEST STANDARD (2 VW)   Standing Status: Future Standing Exp. Date: 06/17/22   Order Comments: results to dr Rajiv Fierro   Scheduling Instructions: Prior to appt with pulmonary     Basic Metabolic Panel   Standing Status: Future Standing Exp. Date: 06/17/22     [id number] [facility] Diabetes Education [facility]   Referral Priority: Routine Referral Type: Eval and Treat   Referral Reason: Specialty Services Required   Number of Visits Requested: 1     DME Order for Nebulizer as OP   Order Comments: You must complete the order parameters below and add the medical necessity documentation for this DME in a separate note. Nebulizer with compressor  Disposable Med Nebs 2 per month  Reusable Med Nebs 1 per 6 months  Aerosol Mask 1 per month  Replacement Filters 2 per month  All other related supplies as needed per month    Frequency:  Four times daily    Diagnosis: copd    Length of Need: 12 months     Order Specific Question Answer Comments   Medications being used: Other (Comment) duoneb 3 ml     DME Order for Home Oxygen as OP   Order Comments: You must complete the order parameters below and add the medical necessity documentation for this DME in a separate note.     Stationary Oxygen Concentrator at 3 lpm via Nasal Cannula    Stationary Prescribed at:  Continuous    Portable Gaseous O2 System and contents at 3 lpm via Nasal Cannula    Non Applicable    Diagnosis: copd  Length of need: Lifetime     DME Order for Glucometer as OP   Order Comments: You must complete the order parameters below and add the medical necessity documentation for this DME in a separate note. Home blood glucose monitor    Diagnosis: Diabetes Type II, insulin dependant    Testing frequency: Check blood sugars and record 4 times daily    Duration: purchase       Time Spent on discharge is  35 mins in patient examination, evaluation, counseling as well as medication reconciliation, prescriptions for required medications, discharge plan and follow up. Electronically signed by   Stephanie Morales MD  6/17/2021  2:55 PM      Thank you Dr. Venancio Abbott MD for the opportunity to be involved in this patient's care.

## 2021-06-17 NOTE — CARE COORDINATION
[facility] ICU Quality Flow/Interdisciplinary Rounds Progress Note    Quality Flow Rounds held on June 17, 2021 at [address] Attending:  Bedside Nurse, , Nursing Unit Leadership, Dietary, Respiratory Therapy, Physical Therapy, Occupational Therapy and Spiritual Care/    Anticipated Discharge Date:  Expected Discharge Date: 06/15/21    Anticipated Discharge Disposition: home    Readmission Risk              Risk of Unplanned Readmission:  17           Discussed patient goal for the day, patient clinical progression, and barriers to discharge.   The following Goal(s) of the Day/Commitment(s) have been identified:  Activity Progression  as tolerated, Oxygen - Arrange Home O2, Oxygen - Obtain DME Choice and needs nebulizer, follow up appt with pulmonary      Amada Thomas RN  June 17, 2021

## 2021-06-17 NOTE — PROGRESS NOTES
Home Oxygen Evaluation    Home Oxygen Evaluation completed. Patient is on3 liters per minute via cannula  Resting SpO2 = 93%  Resting SpO2 on room air =89%    SpO2 on room air with exercise =86%  SpO2 on oxygen as above with exercise =88%    Nocturnal Oximetry with patient on room air is recommended is SpO2 is between 89% and 95% (requires additional order).     Mi Morgan RCP  1:31 PM

## 2021-06-17 NOTE — CARE COORDINATION
Patient has O2 and nebulizer ordered - offered choice of DME provider - requested [facility] - orders, face to face, chart notes, testing and demographics faxed. Requesting delivery to patient room today for discharge. 1500 call to [facility] to confirm receipt of order - left message on  requesting return call. 1523 call returned from [facility] - confirmed that orders received and being worked on.

## 2021-06-18 NOTE — PROGRESS NOTES
CLINICAL PHARMACY NOTE: MEDS TO BEDS    Total # of Prescriptions Filled: 5   The following medications were delivered to the patient:  · DUoneb  · Aspirin chew 81  · benzon 100  · levoquin 500mg  · Prednisone 10    Additional Documentation:

## 2021-06-18 NOTE — PROGRESS NOTES
Schedule appointment in the office with me, although she was seen in [facility] and if she want to follow there then schedule appointment there in 3-4 weeks

## 2021-06-21 ENCOUNTER — TELEPHONE (OUTPATIENT)
Dept: PULMONOLOGY | Age: 51
End: 2021-06-21

## 2021-06-22 ENCOUNTER — TELEPHONE (OUTPATIENT)
Dept: PULMONOLOGY | Age: 51
End: 2021-06-22

## 2021-06-22 NOTE — TELEPHONE ENCOUNTER
Patient was never seen in our office. If she has problem coming to this office.   Please schedule appointment in [facility] office with Dr. Magdalena Knapp as he has seen the patient also when she was in the hospital.  She will need home O2 evaluation otherwise oxygen cannot be discontinued without having the test done

## 2021-06-22 NOTE — TELEPHONE ENCOUNTER
The patient called to cancel her follow up appt on 6-23-21.  She stated that she feels a lot better and is wondering if she can wean herself off the oxygen if she is checking her pulse daily and staying around 94% please advise

## 2021-07-15 ENCOUNTER — HOSPITAL ENCOUNTER (OUTPATIENT)
Dept: SLEEP CENTER | Age: 51
Discharge: HOME | End: 2021-07-17
Payer: COMMERCIAL

## 2021-07-15 DIAGNOSIS — J20.9 COPD WITH ACUTE BRONCHITIS (HCC): ICD-10-CM

## 2021-07-15 DIAGNOSIS — J96.01 ACUTE HYPOXEMIC RESPIRATORY FAILURE (HCC): ICD-10-CM

## 2021-07-15 DIAGNOSIS — J44.0 COPD WITH ACUTE BRONCHITIS (HCC): ICD-10-CM

## 2021-07-15 DIAGNOSIS — E66.9 OBESITY (BMI 30-39.9): ICD-10-CM

## 2021-07-15 DIAGNOSIS — G47.33 OSA (OBSTRUCTIVE SLEEP APNEA): ICD-10-CM

## 2021-07-15 DIAGNOSIS — J44.1 ACUTE EXACERBATION OF CHRONIC OBSTRUCTIVE PULMONARY DISEASE (HCC): ICD-10-CM

## 2021-07-15 PROCEDURE — 95810 POLYSOM 6/> YRS 4/> PARAM: CPT

## 2021-07-16 VITALS
WEIGHT: 234 LBS | RESPIRATION RATE: 16 BRPM | HEART RATE: 58 BPM | OXYGEN SATURATION: 93 % | BODY MASS INDEX: 38.99 KG/M2 | HEIGHT: 65 IN

## 2021-07-26 LAB — STATUS: NORMAL

## 2021-07-30 ENCOUNTER — HOSPITAL ENCOUNTER (OUTPATIENT)
Dept: DIABETES SERVICES | Age: 51
Setting detail: THERAPIES SERIES
Discharge: HOME | End: 2021-07-30
Payer: COMMERCIAL

## 2021-07-30 DIAGNOSIS — E11.65 TYPE 2 DIABETES MELLITUS WITH HYPERGLYCEMIA, WITH LONG-TERM CURRENT USE OF INSULIN (HCC): ICD-10-CM

## 2021-07-30 DIAGNOSIS — Z79.4 TYPE 2 DIABETES MELLITUS WITH HYPERGLYCEMIA, WITH LONG-TERM CURRENT USE OF INSULIN (HCC): ICD-10-CM

## 2021-07-30 DIAGNOSIS — E16.2 HYPOGLYCEMIA: Primary | ICD-10-CM

## 2021-07-30 PROCEDURE — G0108 DIAB MANAGE TRN  PER INDIV: HCPCS

## 2021-07-30 SDOH — ECONOMIC STABILITY: FOOD INSECURITY: ADDITIONAL INFORMATION: NO

## 2021-07-30 NOTE — LETTER
[facility]  [facility] [address]  [facility] [address]  Phone: [phone]         August 1, 2021    To: Indira Duran MD    From: Noman Boudreaux RD, LD    Patient: Deric Hughes   YOB: 1970   Date of Visit: 7/30/21      An initial assessment to determine diabetes education needs was completed on. Education plan includes:interpretation of lab results, blood sugar goals, complications of diabetes mellitus, hypoglycemia prevention and treatment, exercise, illness management, self-monitoring of blood glucose skills, nutrition, carbohydrate counting and site rotation. An ongoing plan was created to include: follow up 8/5/21. Thank you for the opportunity to provide Diabetes Self Management Education to your patient.

## 2021-08-01 NOTE — PROGRESS NOTES
This visit is a TeleHealth encounter (During EFWZJ-22 public health emergency). Pursuant to the emergency declaration under the [address] [place] [address] and the [employer] Act, this Virtual Visit was conducted with patient's (and/or legal guardian's) consent, to reduce the patient's risk of exposure to COVID-19 and provide necessary medical care. The patient (and/or legal guardian) has also been advised to contact this office for worsening conditions or problems, and seek emergency medical treatment and/or call 911 if deemed necessary. Patient identification was verified at the start of the visit: Yes    Total time spent for this encounter: 1 hour, 10-11am    - this encounter was done as one on one virtual /  phone visit as no group sessions being offered for 2 months due to covid - 19 pandemic      Services were provided through a video synchronous discussion virtually to substitute for in-person clinic visit. Patient and provider were located at their individual home/office. Diabetes Self- Management Education Program Assessment -   Also see Diabetic Screening  Patient, Loc Greene,  here for diabetes self-management education  visit/ assessment. Today's visit was in an individual setting.     MEDICAL HISTORY:  Past Medical History:   Diagnosis Date    ADD (attention deficit disorder)     Arrhythmia     SVT then had an ablation    Asthma     Back pain     Bronchitis     Caffeine use     6 cola / day -Excessive use    Cholelithiasis     COPD (chronic obstructive pulmonary disease) (HCC)     Depression     [facility] mellitus ([facility] [address] [facility]     Diabetes mellitus type 2, noninsulin dependent (HCC)     Diarrhea     GERD (gastroesophageal reflux disease)     Hyperlipidemia     Kidney infection     Kidney stone     Kidney stones     Pneumonia     Sleep apnea     no cpap or bipap     Family History Problem Relation Age of Onset    No Known Problems Mother     No Known Problems Father     Heart Disease Maternal Grandmother     Diabetes Maternal Grandmother     Heart Disease Maternal Grandfather     Diabetes Maternal Grandfather      Zithromax [azithromycin]     There is no immunization history on file for this patient. Current Medications  Current Outpatient Medications   Medication Sig Dispense Refill    budesonide-formoterol (SYMBICORT) 160-4.5 MCG/ACT AERO Inhale 2 puffs into the lungs 2 times daily 1 Inhaler 3    aspirin 81 MG chewable tablet Take 1 tablet by mouth daily 30 tablet 3    ipratropium-albuterol (DUONEB) 0.5-2.5 (3) MG/3ML SOLN nebulizer solution Inhale 3 mLs into the lungs every 6 hours as needed for Shortness of Breath 360 mL 1    nicotine (NICODERM CQ) 21 MG/24HR Place 1 patch onto the skin every 24 hours 30 patch 0    insulin glargine (LANTUS SOLOSTAR) 100 UNIT/ML injection pen Inject 30 Units into the skin 2 times daily 5 pen 3    insulin lispro, 1 Unit Dial, (HUMALOG KWIKPEN) 100 UNIT/ML SOPN Inject 3 Units into the skin 3 times daily (before meals) 2.7 mL 0    Insulin Pen Needle (MEIJER PEN NEEDLES) 31G X 6 MM MISC 1 each by Does not apply route daily 100 each 3    losartan (COZAAR) 25 MG tablet Take 0.5 tablets by mouth daily 15 tablet 0    atorvastatin (LIPITOR) 40 MG tablet Take 1 tablet by mouth daily 30 tablet 0    ALPRAZolam (XANAX) 0.25 MG tablet Take 0.25 mg by mouth every 12 hours as needed.  clonazePAM (KLONOPIN) 1 MG tablet Take 0.5-1 mg by mouth every 12 hours as needed.  AMLODIPINE BESYLATE PO Take 5 mg by mouth daily      PROAIR  (90 Base) MCG/ACT inhaler   0    traZODone (DESYREL) 50 MG tablet Take 100 mg by mouth nightly        No current facility-administered medications for this encounter.   :     Comments:  Allergies:     Allergies   Allergen Reactions    Zithromax [Azithromycin] Hives         A1C blood level - at goal < 7%   Lab Results   Component Value Date    LABA1C 9.8 (H) 06/14/2021     Lab Results   Component Value Date    CREATININE 0.75 06/17/2021       Blood pressure ( 130/ 80)  Or less  BP Readings from Last 3 Encounters:   06/17/21 122/61   05/07/21 118/75   01/26/18 (!) 148/87        Cholesterol ( LDL under  100)   No results found for: [address], LDLCHOLESTEROL, LDLDIRECT    Diabetes Self- Management Education Record    Participant Name: Jazzy Leo  Referring Provider: Maria Elena Iglesias MD   Assessment/Evaluation Ratings:  1=Needs Instruction   4=Demonstrates Understanding/Competency  2=Needs Review   NC=Not Covered    3=Comprehends Key Points  N/A=Not Applicable  Topics/Learning Objectives Pre-session Assess Date:  7/30/21 More Nurse. Date Reinforce Date Post- session Eval Comments   Diabetes disease process & Treatment process: Define diabetes & pre-diabetes; Identify own type of diabetes; role of the pancreas; signs/symptoms; diagnostic criteria; prevention & treatment options; contributing factors. 2    At least 10 year h/o diabetes and h/o GDM with her children. Strong family h/o diabetes. 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 Currently not working. Has COPD and other medical issues. Takes insulin. Incorporating nutritional management into lifestyle: Describe effect of type, amount & timing of food on blood glucose; Describe basic meal planning techniques & current nutrition guideline   2 pt was drinking regular mountain dew and gatorade. Down to 1- 12 oz  mountain dew and s.f. gatorade and   Doesn't seem to eat regular meals. Spouse still working. She Spends a lot of time at her camper, doesn't eat regular meals. Tends to snack or grab convenience foods/take out. Knows she needs to make changes. Suggested glucerna or boost glucose control for breakfast.   What to eat - Food groups, When to eat - timing of meals and snacks, and How much to eat - portions control.        calories/ day   CHO choices/ meal   CHO choices/  day   grams of protein /day   gram of fat /day     Correctly read food labels & demonstrate CHO counting & portion control with personalized meal plan. Identify dining out strategies, & dietary sick day guidelines. 2       Incorporating physical activity into lifestyle:   Verbalize effect of exercise on blood glucose levels; benefits of regular exercise; safety considerations; contraindications; maintenance of activity. 2    7/30/21 JW not currently active beyond daily activities d/t COPD   Using medications safely:  Identify effects of diabetes medicines on blood glucose levels; List diabetes medication taken, action & side effects;    2       Insulin / Injectable - Appropriate injection sites; proper storage; supplies needed; proper technique; safe needle disposal guidelines. 3    Does 30 units Lantus via pen bid and rapid insulin 3 units ac meals   Monitoring blood glucose, interpreting and using results:  Identify recommended & personal blood glucose targets; importance of testing; testing supplies; HgbA1C target levels; Factors affecting blood glucose; Importance of logging blood glucose levels for pattern recognition; ketone testing; safe lancet disposal.   3    Interested in CGM but wasn't checking bs regularly and told she needs to check tid and do consistently before being considered for CGM   Prevention, detection & treatment of acute complications:  Identify symptoms of hyper & hypoglycemia, and prevention & treatment strategies. 2       Describe sick day guidelines & indications for  physician notification. Identify short term consequences of poor control. Disaster preparedness strategies    1       Prevention, detection & treatment of chronic complications:  Define the natural course of diabetes & describe the relationship of blood glucose levels to long term complications of diabetes. Identify preventative measures & standards of care.    1 Developing strategies to address psychosocial issues:  Describe feelings about living with diabetes; Describe how stress, depression & anxiety affect blood glucose; Identify coping strategies; Identify support needed & support network available. 2    Pt has a lot of stress in her life with helping with care of her mother-in-law and not getting support from family. Mother-in-law mean to pt. Does have a camper and spends a lot of time from April to Fall. Developing strategies to promote health/change behavior: Identify 7 self-care behaviors; Personal health risk factors; Benefits, challenges & strategies for behavioral change;    2         Individualized goal selection. My goal , to help me improve my health, I will:   1. Find another healthy beverage to drink  2. Plan  Follow-up Appointments planned 8/5/21    Instruction Method: [x]Lecture/Discussion  []Power Point Presentation  [x]Handouts  []Return Demonstration    Education Materials/Equipment Provided (VIA Mail for phone visits)  :    [x]Self-Management - Initial assessment - Enrolment in to ADA  Where do I Begin, Living with Type 2 diabetes ADA home support program and  handout on diabetes education classes. 7/30/21   []Self-Management  Class 1 -Self-Management  Class 1 - \"How to Thrive: A guide for Your Journey with Diabetes\" ADA booklet 2020 -pages 4, 11- 15 , 25 -23   o one day food diary and envelope for return of diet HX   o  You tube and website resource sheet-Understanding Type 2 Diabetes from Animated Diabetes Patient https://[email or web address] be/[email or web address]      [] Self-Management  Class 2 - Meal Plan and handout for serving sizes, smarter snacking, Ready Set Carb Counting / Plate Method, Nutrient Conversion and International Diabetes [address] Eating for People with Diabetes and Nutrition in the [employer] - fast facts about fast food and \"How to Thrive: A guide for Your journey with Diabetes\" - ADA booklet 2020  - pages 12 -17    [] Self-Management  Class 3 -   \"How to Thrive: A guide for Your Journey with Diabetes\"  pages 6- 9 &  21 - 28,  type 2 diabetes and the role of GLP- 1,  Individualized Diabetes report card     [] Self-Management Class 4 - BD Booklet  Sick Day Rules and  Dinning Out Guide , recipe hand outs and tips, diabetes Cookbooks  ( when available), & \"How to Thrive: A guide for Your journey with Diabetes\" - ADA booklet 2020  - pages 39 -39     []Self-Management - 3 month follow -  AADE7 Self care behaviors work sheets,  Online resource list - March 2020 ,  How to Thrive: A guide for Your journey with Diabetes\" - ADA booklet 2020  - pages 39. []Self-Management  Gestational - RN class -Resource materials sent out : care booklet - \" Gestational Diabetes Mellitus ( GDM) toolkit form [place] gestational diabetes postpartum care learning collaborative 2018. \"Simple Guidelines for meal planning with gestational diabetes. SMBG sheets to fax back to [facility] weekly. BD  healthy injection site selection and rotation with 6 mm insulin syringe and 4 mm pen needle. Gestational diabetes handout from [facility] 2016. Did you have gestational diabetes when you were pregnant? Handout from [facility]  April 2014    []Self-Management Gestational - RD class - My Food Plan for Gestational diabetes    []Glucose Meter     []Insulin Kit     []Other      Encounter Type Date Start Time End Time Comments No Show Dates   Assessment   7/30/21 JW   10:00   11:00   []In Person  [x]Telephone    Class 1 - Understanding diabetes     []TelephoneAmerican Diabetes Association  www. diabetes. org    Class 2- Nutrition and diabetes      []Telephone  Healthy Eating with Diabetes- Automatic Data of Diabetes and Digestive and Kidney   https://youtu. be/[email or web address]    Class 3 - Preventing Complications     []Telephone    Class 4 -  In depth Nutrition and sick day care    []Telephone  Diabetes Food hub  www. diabetesfoodhub.org     Class 5 - 3 month follow up / goal reassessment        Gestational - RN         Gestational - RD        Individual MNT         Shared Med Appt         Yearly Follow-up        Meter Instrx      How to Measure Your Blood Sugar - [facility] Patient Education  https://youtu. be/[email or web address]    Insulin Instrx      []Pen  []Vial & Syringe   BD Diabetes Care: How to Inject Insulin with a Pen Needle  https://youtu. be/[email or web address]    Diabetes Care: How to Inject Insulin with a Syringe  https://youtu. be/[email or web address]       DSMS Support :   [] MNT      [] Annual update     [] Starting Fresh  adults living with diabetes or pre diabetes. [address] [place] [address] [phone] call for dates    []  Diabetes Group at  [address] matos - Free 6 week diabetes education support   classes - use web site interest form found at  [employer] - to enroll       []ADA  Where do I Begin, Living with Type 2 diabetes ADA home support program  Web site: diabetes. org/living    Call: 1800 DIABETES  e-mail: [email or web address]     []  Internet web sites - ADAWeb site: diabetes. org and diabetesfoodhub.org      Post Education Referrals:      [] [address] information sheet and [address] [facility] , 21       [] Dental care - [facility]     [] [facility] ([facility]) link  phone number - for information and referral to [address], WOUND, WEIGHT MANAGEMENT        []Other  Maya Greenwood RD, LD Family

## 2021-08-10 ENCOUNTER — OFFICE VISIT (OUTPATIENT)
Dept: PULMONOLOGY | Age: 51
End: 2021-08-10
Payer: COMMERCIAL

## 2021-08-10 VITALS — WEIGHT: 240 LBS | HEIGHT: 65 IN | HEART RATE: 79 BPM | BODY MASS INDEX: 39.99 KG/M2 | OXYGEN SATURATION: 94 %

## 2021-08-10 DIAGNOSIS — J20.9 COPD WITH ACUTE BRONCHITIS (HCC): Primary | ICD-10-CM

## 2021-08-10 DIAGNOSIS — J44.0 COPD WITH ACUTE BRONCHITIS (HCC): Primary | ICD-10-CM

## 2021-08-10 PROCEDURE — 94726 PLETHYSMOGRAPHY LUNG VOLUMES: CPT | Performed by: INTERNAL MEDICINE

## 2021-08-10 PROCEDURE — 94060 EVALUATION OF WHEEZING: CPT | Performed by: INTERNAL MEDICINE

## 2021-08-10 PROCEDURE — 94729 DIFFUSING CAPACITY: CPT | Performed by: INTERNAL MEDICINE

## 2021-08-12 ENCOUNTER — OFFICE VISIT (OUTPATIENT)
Dept: PULMONOLOGY | Age: 51
End: 2021-08-12
Payer: COMMERCIAL

## 2021-08-12 VITALS
OXYGEN SATURATION: 94 % | WEIGHT: 240 LBS | SYSTOLIC BLOOD PRESSURE: 131 MMHG | RESPIRATION RATE: 16 BRPM | DIASTOLIC BLOOD PRESSURE: 81 MMHG | TEMPERATURE: 97.3 F | BODY MASS INDEX: 39.99 KG/M2 | HEART RATE: 81 BPM | HEIGHT: 65 IN

## 2021-08-12 DIAGNOSIS — Z71.6 ENCOUNTER FOR SMOKING CESSATION COUNSELING: ICD-10-CM

## 2021-08-12 DIAGNOSIS — J41.0 SIMPLE CHRONIC BRONCHITIS (HCC): ICD-10-CM

## 2021-08-12 DIAGNOSIS — J43.2 CENTRILOBULAR EMPHYSEMA (HCC): ICD-10-CM

## 2021-08-12 DIAGNOSIS — J43.9 MIXED RESTRICTIVE AND OBSTRUCTIVE LUNG DISEASE (HCC): Primary | ICD-10-CM

## 2021-08-12 DIAGNOSIS — R06.83 SNORING: ICD-10-CM

## 2021-08-12 DIAGNOSIS — G47.61 PLMD (PERIODIC LIMB MOVEMENT DISORDER): ICD-10-CM

## 2021-08-12 DIAGNOSIS — J98.4 MIXED RESTRICTIVE AND OBSTRUCTIVE LUNG DISEASE (HCC): Primary | ICD-10-CM

## 2021-08-12 PROCEDURE — 99214 OFFICE O/P EST MOD 30 MIN: CPT | Performed by: INTERNAL MEDICINE

## 2021-08-12 PROCEDURE — 3017F COLORECTAL CA SCREEN DOC REV: CPT | Performed by: INTERNAL MEDICINE

## 2021-08-12 PROCEDURE — G8427 DOCREV CUR MEDS BY ELIG CLIN: HCPCS | Performed by: INTERNAL MEDICINE

## 2021-08-12 PROCEDURE — G8926 SPIRO NO PERF OR DOC: HCPCS | Performed by: INTERNAL MEDICINE

## 2021-08-12 PROCEDURE — G8417 CALC BMI ABV UP PARAM F/U: HCPCS | Performed by: INTERNAL MEDICINE

## 2021-08-12 PROCEDURE — 3023F SPIROM DOC REV: CPT | Performed by: INTERNAL MEDICINE

## 2021-08-12 PROCEDURE — 4004F PT TOBACCO SCREEN RCVD TLK: CPT | Performed by: INTERNAL MEDICINE

## 2021-08-12 RX ORDER — INSULIN ASPART 100 [IU]/ML
INJECTION, SOLUTION INTRAVENOUS; SUBCUTANEOUS
COMMUNITY
Start: 2021-06-18

## 2021-08-12 RX ORDER — NITROGLYCERIN 0.4 MG/1
0.4 TABLET SUBLINGUAL EVERY 5 MIN PRN
COMMUNITY
Start: 2020-10-21

## 2021-08-12 RX ORDER — INSULIN DETEMIR 100 [IU]/ML
30 INJECTION, SOLUTION SUBCUTANEOUS 2 TIMES DAILY
COMMUNITY
Start: 2021-04-11

## 2021-08-12 RX ORDER — VARENICLINE TARTRATE 1 MG/1
1 TABLET, FILM COATED ORAL 2 TIMES DAILY
Qty: 60 TABLET | Refills: 2 | Status: SHIPPED | OUTPATIENT
Start: 2021-08-12 | End: 2021-09-11

## 2021-08-12 ASSESSMENT — SLEEP AND FATIGUE QUESTIONNAIRES
HOW LIKELY ARE YOU TO NOD OFF OR FALL ASLEEP WHILE WATCHING TV: 3
HOW LIKELY ARE YOU TO NOD OFF OR FALL ASLEEP IN A CAR, WHILE STOPPED FOR A FEW MINUTES IN TRAFFIC: 0
HOW LIKELY ARE YOU TO NOD OFF OR FALL ASLEEP WHILE LYING DOWN TO REST IN THE AFTERNOON WHEN CIRCUMSTANCES PERMIT: 0
HOW LIKELY ARE YOU TO NOD OFF OR FALL ASLEEP WHILE SITTING QUIETLY AFTER LUNCH WITHOUT ALCOHOL: 1
HOW LIKELY ARE YOU TO NOD OFF OR FALL ASLEEP WHEN YOU ARE A PASSENGER IN A CAR FOR AN HOUR WITHOUT A BREAK: 3
HOW LIKELY ARE YOU TO NOD OFF OR FALL ASLEEP WHILE SITTING AND TALKING TO SOMEONE: 1
HOW LIKELY ARE YOU TO NOD OFF OR FALL ASLEEP WHILE SITTING AND READING: 2
HOW LIKELY ARE YOU TO NOD OFF OR FALL ASLEEP WHILE SITTING INACTIVE IN A PUBLIC PLACE: 2
ESS TOTAL SCORE: 12

## 2021-08-14 PROBLEM — J44.9 MIXED RESTRICTIVE AND OBSTRUCTIVE LUNG DISEASE: Status: ACTIVE | Noted: 2021-08-14

## 2021-08-14 PROBLEM — G47.61 PLMD (PERIODIC LIMB MOVEMENT DISORDER): Status: ACTIVE | Noted: 2021-08-14

## 2021-08-14 PROBLEM — J43.2 CENTRILOBULAR EMPHYSEMA (HCC): Status: ACTIVE | Noted: 2021-08-14

## 2021-08-14 PROBLEM — J98.4 MIXED RESTRICTIVE AND OBSTRUCTIVE LUNG DISEASE (HCC): Status: ACTIVE | Noted: 2021-08-14

## 2021-08-14 PROBLEM — Z71.6 ENCOUNTER FOR SMOKING CESSATION COUNSELING: Status: ACTIVE | Noted: 2021-08-14

## 2021-08-14 PROBLEM — J41.0 SIMPLE CHRONIC BRONCHITIS (HCC): Status: ACTIVE | Noted: 2021-08-14

## 2021-08-14 PROBLEM — J43.9 MIXED RESTRICTIVE AND OBSTRUCTIVE LUNG DISEASE (HCC): Status: ACTIVE | Noted: 2021-08-14

## 2021-08-14 PROBLEM — R06.83 SNORING: Status: ACTIVE | Noted: 2021-08-14

## 2021-08-14 NOTE — PROGRESS NOTES
Pulmonary function test  August 10, 2021    Spirometry    Flow volume loop does not show an upper airway obstructive defect. Flow volume loop is restricted  Postbronchodilator FEV1 75% predicted there was 8% bronchodilator change with an improvement from 69% predicted. Postbronchodilator FVC 72% predicted, prebronchodilator 70% predicted with 3% bronchodilator change  FEV1 FVC ratio 85    Lung volume by body box  Total lung capacity 108% predicted, % predicted    Airway resistance increased    Uncorrected diffusion capacity 86% predicted      Final impression  Combined obstructive and restrictive ventilatory dysfunction of mild to moderate severity with a bronchodilator response that does not meet ATS criteria. But a clinical trial is recommended.     Clinical  correlation advised  Electronically signed by Anel Weiner MD on 8/14/2021 at 3:18 PM

## 2021-08-14 NOTE — PROGRESS NOTES
respiratory failure ([facility] [address] 6/15/2021    ADD (attention deficit disorder)     Arrhythmia     SVT then had an ablation    Asthma     Back pain     Bilateral nephrolithiasis 1/10/2018    Bronchitis     Caffeine use     6 cola / day -Excessive use    Cholelithiasis     COPD (chronic obstructive pulmonary disease) (HCC)     COPD with acute bronchitis (HCC)     Depression     Diabetes mellitus ([facility]     Diabetes mellitus type 2, noninsulin dependent ([facility]     Diarrhea     Frequency of urination 1/10/2018    GERD (gastroesophageal reflux disease)     Hyperlipidemia     Hypoglycemia     Hypoxia 2021    Kidney infection     Kidney stone     Kidney stones     Obesity (BMI 30-39.9) 6/15/2021    ATUL (obstructive sleep apnea) 6/15/2021    Not compliant     Pneumonia     Sleep apnea     no cpap or bipap    Tobacco abuse 6/15/2021    Type 2 diabetes mellitus with hyperglycemia, with long-term current use of insulin ([facility] [address] 2016       Family History:       Problem Relation Age of Onset    No Known Problems Mother     No Known Problems Father     Heart Disease Maternal Grandmother     Diabetes Maternal Grandmother     Heart Disease Maternal Grandfather     Diabetes Maternal Grandfather        SURGICAL HISTORY:   Past Surgical History:   Procedure Laterality Date     SECTION   &     CHOLECYSTECTOMY      COLONOSCOPY      CYSTOSCOPY      W/ stent placement    HAND SURGERY      HYSTERECTOMY      HYSTERECTOMY, TOTAL ABDOMINAL  2005    LITHOTRIPSY      TONSILLECTOMY AND ADENOIDECTOMY  1973    TUBAL LIGATION                Not in a hospital admission.   Allergies   Allergen Reactions    Zithromax [Azithromycin] Hives     Social History     Tobacco Use   Smoking Status Current Every Day Smoker    Packs/day: 1.00    Years: 35.00    Pack years: 35.00    Types: Cigarettes   Smokeless Tobacco Never Used     Prior to Admission medications    Medication Sig Start Date End Date Taking? Authorizing Provider   NOVOLOG FLEXPEN 100 UNIT/ML injection pen inject 2 to 20 units subcutaneously four times a day before meals. ..  (REFER TO PRESCRIPTION NOTES). 6/18/21  Yes Historical Provider, MD   insulin detemir (LEVEMIR FLEXTOUCH) 100 UNIT/ML injection pen Inject 30 Units into the skin 2 times daily 4/11/21  Yes Historical Provider, MD   nitroGLYCERIN (NITROSTAT) 0.4 MG SL tablet Place 0.4 mg under the tongue every 5 minutes as needed 10/21/20  Yes Historical Provider, MD   varenicline (CHANTIX CONTINUING MONTH PETTY) 1 MG tablet Take 1 tablet by mouth 2 times daily 8/12/21 9/11/21 Yes Ria Dakins, MD   budesonide-formoterol (SYMBICORT) 160-4.5 MCG/ACT AERO Inhale 2 puffs into the lungs 2 times daily 6/17/21  Yes MAXIMINO Stokes CNP   aspirin 81 MG chewable tablet Take 1 tablet by mouth daily 6/18/21  Yes MAXIMINO Stokes CNP   ipratropium-albuterol (DUONEB) 0.5-2.5 (3) MG/3ML SOLN nebulizer solution Inhale 3 mLs into the lungs every 6 hours as needed for Shortness of Breath 6/17/21 8/16/21 Yes MAXIMINO Stokes CNP   nicotine (NICODERM CQ) 21 MG/24HR Place 1 patch onto the skin every 24 hours 6/17/21  Yes MAXIMINO Stokes CNP   Insulin Pen Needle (MEIJER PEN NEEDLES) 31G X 6 MM MISC 1 each by Does not apply route daily 6/17/21  Yes Jose Juan Black MD   losartan (COZAAR) 25 MG tablet Take 0.5 tablets by mouth daily  Patient taking differently: Take 25 mg by mouth daily  6/18/21 8/12/21 Yes Jose Juan Black MD   ALPRAZolam (XANAX) 0.25 MG tablet Take 0.25 mg by mouth every 12 hours as needed. 4/6/21  Yes Historical Provider, MD   clonazePAM (KLONOPIN) 1 MG tablet Take 0.5-1 mg by mouth every 12 hours as needed.  5/26/21  Yes Historical Provider, MD   AMLODIPINE BESYLATE PO Take 5 mg by mouth daily   Yes Historical Provider, MD   PROAIR  (82 Base) MCG/ACT inhaler  12/15/17  Yes Historical Provider, MD   traZODone (DESYREL) 50 MG tablet Take 100 mg by mouth nightly  11/7/17  Yes Historical Provider, MD   insulin glargine (LANTUS SOLOSTAR) 100 UNIT/ML injection pen Inject 30 Units into the skin 2 times daily  Patient not taking: Reported on 8/12/2021 6/17/21   Adán Laguerre MD   insulin lispro, 1 Unit Dial, (HUMALOG KWIKPEN) 100 UNIT/ML SOPN Inject 3 Units into the skin 3 times daily (before meals) 6/17/21 7/17/21  Adán Laguerre MD   atorvastatin (LIPITOR) 40 MG tablet Take 1 tablet by mouth daily 6/18/21 7/18/21  Adán Laguerre MD         Physical Exam  Head and neck atraumatic, normocephalic    Lymph nodes-no cervical, supraclavicular lymphadenopathy    Neck-no JVP elevation    Lungs - Ventilating all lobes without any rales, rhonchi, wheezes or dullness. No bronchial breath sounds. Chest expansion equal bilaterally    CVS- S1, S2 regular. No S3 no S4, no murmurs    Abdomen-nontender, nondistended. Bowel sounds are present. No organomegaly  Truncal obesity  Lower extremity-no edema    Upper extremity-no edema    Neurological-grossly normal cranial nerves. No overt motor deficit     /81 (Site: Right Upper Arm, Position: Sitting)   Pulse 81   Temp 97.3 °F (36.3 °C) (Temporal)   Resp 16   Ht 5' 5\" (1.651 m)   Wt 240 lb (108.9 kg)   SpO2 94%   BMI 39.94 kg/m²                   Assessment   Diagnosis Orders   1. Mixed restrictive and obstructive lung disease ([facility]     2. Encounter for smoking cessation counseling  varenicline (CHANTIX CONTINUING MONTH PETTY) 1 MG tablet   3. Simple chronic bronchitis ([facility] [address]     4. Snoring - negative sleep study      5. PLMD (periodic limb movement disorder)     6. Centrilobular emphysema (HCC) mild          Plan:  1. Mild restrictive and obstructive ventilatory dysfunction . 2. Patient has been using Chantix starter pack at home and needs continuation pack. She will use nicotine patch in addition to Chantix to help with smoking cessation. 3. Does not have sleep apnea  4.  Continue Symbicort and use DuoNeb as needed  5. Weight loss is encouraged  6. Yearly lung cancer screening CT Next 1 due in June 2022  7. Chest x-ray as ordered on discharge to follow-up pulmonary infiltrates. 8. Follow-up in 6 months        Electronically signed by Rg Pan MD on   8/14/21 at 3:00 PM EDT  Please note that this chart was generated using voice recognition Dragon dictation software. Although every effort was made to ensure the accuracy of this automated transcription, some errors in transcription may have occurred.

## 2025-04-06 ENCOUNTER — ANESTHESIA EVENT (OUTPATIENT)
Dept: OPERATING ROOM | Age: 55
End: 2025-04-06
Payer: COMMERCIAL

## 2025-04-06 ENCOUNTER — APPOINTMENT (OUTPATIENT)
Dept: GENERAL RADIOLOGY | Age: 55
End: 2025-04-06
Payer: COMMERCIAL

## 2025-04-06 ENCOUNTER — HOSPITAL ENCOUNTER (OUTPATIENT)
Age: 55
Setting detail: OBSERVATION
Discharge: HOME | End: 2025-04-08
Attending: EMERGENCY MEDICINE
Payer: COMMERCIAL

## 2025-04-06 ENCOUNTER — ANESTHESIA (OUTPATIENT)
Dept: OPERATING ROOM | Age: 55
End: 2025-04-06
Payer: COMMERCIAL

## 2025-04-06 DIAGNOSIS — N39.0 URINARY TRACT INFECTION WITH HEMATURIA, SITE UNSPECIFIED: ICD-10-CM

## 2025-04-06 DIAGNOSIS — R31.9 URINARY TRACT INFECTION WITH HEMATURIA, SITE UNSPECIFIED: ICD-10-CM

## 2025-04-06 DIAGNOSIS — N13.2 HYDRONEPHROSIS WITH URINARY OBSTRUCTION DUE TO URETERAL CALCULUS: Primary | ICD-10-CM

## 2025-04-06 DIAGNOSIS — N13.2 HYDRONEPHROSIS WITH RENAL CALCULOUS OBSTRUCTION: ICD-10-CM

## 2025-04-06 PROBLEM — N20.1 CALCULUS OF PROXIMAL LEFT URETER: Status: ACTIVE | Noted: 2025-04-06

## 2025-04-06 PROBLEM — E87.1 HYPONATREMIA: Status: ACTIVE | Noted: 2025-04-06

## 2025-04-06 PROBLEM — N17.9 AKI (ACUTE KIDNEY INJURY): Status: ACTIVE | Noted: 2025-04-06

## 2025-04-06 LAB
ALBUMIN SERPL-MCNC: 3.3 G/DL (ref 3.5–5.2)
ALBUMIN/GLOB SERPL: 0.9 {RATIO} (ref 1–2.5)
ALP SERPL-CCNC: 108 U/L (ref 35–104)
ALT SERPL-CCNC: <5 U/L (ref 5–33)
ANION GAP SERPL CALCULATED.3IONS-SCNC: 11 MMOL/L (ref 9–17)
AST SERPL-CCNC: 7 U/L
BACTERIA URNS QL MICRO: ABNORMAL
BASOPHILS # BLD: 0 K/UL (ref 0–0.2)
BASOPHILS NFR BLD: 0 % (ref 0–2)
BILIRUB SERPL-MCNC: 1.2 MG/DL (ref 0.3–1.2)
BILIRUB UR QL STRIP: ABNORMAL
BUN SERPL-MCNC: 17 MG/DL (ref 6–20)
CALCIUM SERPL-MCNC: 8.7 MG/DL (ref 8.6–10.4)
CHARACTER UR: ABNORMAL
CHLORIDE SERPL-SCNC: 92 MMOL/L (ref 98–107)
CLARITY UR: ABNORMAL
CO2 SERPL-SCNC: 28 MMOL/L (ref 20–31)
COLOR UR: YELLOW
CREAT SERPL-MCNC: 1.3 MG/DL (ref 0.5–0.9)
EOSINOPHIL # BLD: 0 K/UL (ref 0–0.4)
EOSINOPHILS RELATIVE PERCENT: 0 % (ref 1–4)
EPI CELLS #/AREA URNS HPF: ABNORMAL /HPF (ref 0–5)
ERYTHROCYTE [DISTWIDTH] IN BLOOD BY AUTOMATED COUNT: 13.2 % (ref 12.5–15.4)
GFR, ESTIMATED: 48 ML/MIN/1.73M2
GLUCOSE BLD-MCNC: 141 MG/DL (ref 65–105)
GLUCOSE BLD-MCNC: 165 MG/DL (ref 65–105)
GLUCOSE BLD-MCNC: 207 MG/DL (ref 65–105)
GLUCOSE SERPL-MCNC: 181 MG/DL (ref 70–99)
GLUCOSE UR STRIP-MCNC: NEGATIVE MG/DL
HCT VFR BLD AUTO: 44 % (ref 36–46)
HGB BLD-MCNC: 14.8 G/DL (ref 12–16)
HGB UR QL STRIP.AUTO: ABNORMAL
KETONES UR STRIP-MCNC: NEGATIVE MG/DL
LACTATE BLDV-SCNC: 1.3 MMOL/L (ref 0.5–1.9)
LACTATE BLDV-SCNC: 1.9 MMOL/L (ref 0.5–1.9)
LEUKOCYTE ESTERASE UR QL STRIP: ABNORMAL
LIPASE SERPL-CCNC: 21 U/L (ref 13–60)
LYMPHOCYTES NFR BLD: 1.77 K/UL (ref 1–4.8)
LYMPHOCYTES RELATIVE PERCENT: 6 % (ref 24–44)
MAGNESIUM SERPL-MCNC: 1.5 MG/DL (ref 1.6–2.6)
MCH RBC QN AUTO: 29.4 PG (ref 26–34)
MCHC RBC AUTO-ENTMCNC: 33.6 G/DL (ref 31–37)
MCV RBC AUTO: 87.5 FL (ref 80–100)
MONOCYTES NFR BLD: 1.48 K/UL (ref 0.1–0.8)
MONOCYTES NFR BLD: 5 % (ref 1–7)
MORPHOLOGY: NORMAL
NEUTROPHILS NFR BLD: 89 % (ref 36–66)
NEUTS SEG NFR BLD: 26.25 K/UL (ref 1.8–7.7)
NITRITE UR QL STRIP: NEGATIVE
PH UR STRIP: 6 [PH] (ref 5–8)
PLATELET # BLD AUTO: 209 K/UL (ref 140–450)
PMV BLD AUTO: 8.2 FL (ref 8–14)
POTASSIUM SERPL-SCNC: 3.8 MMOL/L (ref 3.7–5.3)
PROT SERPL-MCNC: 6.9 G/DL (ref 6.4–8.3)
PROT UR STRIP-MCNC: ABNORMAL MG/DL
RBC # BLD AUTO: 5.03 M/UL (ref 4–5.2)
RBC #/AREA URNS HPF: ABNORMAL /HPF (ref 0–2)
SODIUM SERPL-SCNC: 131 MMOL/L (ref 135–144)
SP GR UR STRIP: 1.03 (ref 1–1.03)
UROBILINOGEN UR STRIP-ACNC: NORMAL EU/DL (ref 0–1)
WBC #/AREA URNS HPF: ABNORMAL /HPF (ref 0–5)
WBC OTHER # BLD: 29.5 K/UL (ref 3.5–11)

## 2025-04-06 PROCEDURE — 80053 COMPREHEN METABOLIC PANEL: CPT

## 2025-04-06 PROCEDURE — G0378 HOSPITAL OBSERVATION PER HR: HCPCS

## 2025-04-06 PROCEDURE — 6370000000 HC RX 637 (ALT 250 FOR IP): Performed by: NURSE PRACTITIONER

## 2025-04-06 PROCEDURE — 3700000001 HC ADD 15 MINUTES (ANESTHESIA): Performed by: SPECIALIST

## 2025-04-06 PROCEDURE — 2580000003 HC RX 258: Performed by: EMERGENCY MEDICINE

## 2025-04-06 PROCEDURE — 3600000003 HC SURGERY LEVEL 3 BASE: Performed by: SPECIALIST

## 2025-04-06 PROCEDURE — C1769 GUIDE WIRE: HCPCS | Performed by: SPECIALIST

## 2025-04-06 PROCEDURE — 6360000002 HC RX W HCPCS: Performed by: INTERNAL MEDICINE

## 2025-04-06 PROCEDURE — 6370000000 HC RX 637 (ALT 250 FOR IP): Performed by: INTERNAL MEDICINE

## 2025-04-06 PROCEDURE — C2617 STENT, NON-COR, TEM W/O DEL: HCPCS | Performed by: SPECIALIST

## 2025-04-06 PROCEDURE — 36415 COLL VENOUS BLD VENIPUNCTURE: CPT

## 2025-04-06 PROCEDURE — 87086 URINE CULTURE/COLONY COUNT: CPT

## 2025-04-06 PROCEDURE — 96361 HYDRATE IV INFUSION ADD-ON: CPT

## 2025-04-06 PROCEDURE — 96374 THER/PROPH/DIAG INJ IV PUSH: CPT

## 2025-04-06 PROCEDURE — 6360000002 HC RX W HCPCS: Performed by: STUDENT IN AN ORGANIZED HEALTH CARE EDUCATION/TRAINING PROGRAM

## 2025-04-06 PROCEDURE — 85025 COMPLETE CBC W/AUTO DIFF WBC: CPT

## 2025-04-06 PROCEDURE — 81001 URINALYSIS AUTO W/SCOPE: CPT

## 2025-04-06 PROCEDURE — 99255 IP/OBS CONSLTJ NEW/EST HI 80: CPT | Performed by: SPECIALIST

## 2025-04-06 PROCEDURE — 82947 ASSAY GLUCOSE BLOOD QUANT: CPT

## 2025-04-06 PROCEDURE — 2500000003 HC RX 250 WO HCPCS: Performed by: EMERGENCY MEDICINE

## 2025-04-06 PROCEDURE — 7100000000 HC PACU RECOVERY - FIRST 15 MIN: Performed by: SPECIALIST

## 2025-04-06 PROCEDURE — 96372 THER/PROPH/DIAG INJ SC/IM: CPT

## 2025-04-06 PROCEDURE — 2580000003 HC RX 258: Performed by: INTERNAL MEDICINE

## 2025-04-06 PROCEDURE — 83735 ASSAY OF MAGNESIUM: CPT

## 2025-04-06 PROCEDURE — 83605 ASSAY OF LACTIC ACID: CPT

## 2025-04-06 PROCEDURE — 83690 ASSAY OF LIPASE: CPT

## 2025-04-06 PROCEDURE — 7100000001 HC PACU RECOVERY - ADDTL 15 MIN: Performed by: SPECIALIST

## 2025-04-06 PROCEDURE — P9041 ALBUMIN (HUMAN),5%, 50ML: HCPCS | Performed by: STUDENT IN AN ORGANIZED HEALTH CARE EDUCATION/TRAINING PROGRAM

## 2025-04-06 PROCEDURE — 6360000002 HC RX W HCPCS: Performed by: EMERGENCY MEDICINE

## 2025-04-06 PROCEDURE — 3700000000 HC ANESTHESIA ATTENDED CARE: Performed by: SPECIALIST

## 2025-04-06 PROCEDURE — 52332 CYSTOSCOPY AND TREATMENT: CPT | Performed by: SPECIALIST

## 2025-04-06 PROCEDURE — 96375 TX/PRO/DX INJ NEW DRUG ADDON: CPT

## 2025-04-06 PROCEDURE — 99285 EMERGENCY DEPT VISIT HI MDM: CPT

## 2025-04-06 PROCEDURE — 6370000000 HC RX 637 (ALT 250 FOR IP): Performed by: SPECIALIST

## 2025-04-06 PROCEDURE — 2500000003 HC RX 250 WO HCPCS: Performed by: STUDENT IN AN ORGANIZED HEALTH CARE EDUCATION/TRAINING PROGRAM

## 2025-04-06 PROCEDURE — 3600000013 HC SURGERY LEVEL 3 ADDTL 15MIN: Performed by: SPECIALIST

## 2025-04-06 PROCEDURE — 2709999900 HC NON-CHARGEABLE SUPPLY: Performed by: SPECIALIST

## 2025-04-06 PROCEDURE — 2580000003 HC RX 258: Performed by: STUDENT IN AN ORGANIZED HEALTH CARE EDUCATION/TRAINING PROGRAM

## 2025-04-06 PROCEDURE — 99222 1ST HOSP IP/OBS MODERATE 55: CPT | Performed by: INTERNAL MEDICINE

## 2025-04-06 PROCEDURE — 87040 BLOOD CULTURE FOR BACTERIA: CPT

## 2025-04-06 DEVICE — URETERAL STENT
Type: IMPLANTABLE DEVICE | Status: FUNCTIONAL
Brand: POLARIS™ ULTRA

## 2025-04-06 RX ORDER — SODIUM CHLORIDE 9 MG/ML
INJECTION, SOLUTION INTRAVENOUS
Status: DISCONTINUED | OUTPATIENT
Start: 2025-04-06 | End: 2025-04-06 | Stop reason: SDUPTHER

## 2025-04-06 RX ORDER — SODIUM CHLORIDE 9 MG/ML
INJECTION, SOLUTION INTRAVENOUS PRN
Status: DISCONTINUED | OUTPATIENT
Start: 2025-04-06 | End: 2025-04-08 | Stop reason: HOSPADM

## 2025-04-06 RX ORDER — MORPHINE SULFATE 2 MG/ML
2 INJECTION, SOLUTION INTRAMUSCULAR; INTRAVENOUS
Status: DISCONTINUED | OUTPATIENT
Start: 2025-04-06 | End: 2025-04-08 | Stop reason: HOSPADM

## 2025-04-06 RX ORDER — POLYETHYLENE GLYCOL 3350 17 G/17G
17 POWDER, FOR SOLUTION ORAL DAILY PRN
Status: DISCONTINUED | OUTPATIENT
Start: 2025-04-06 | End: 2025-04-08 | Stop reason: HOSPADM

## 2025-04-06 RX ORDER — TAMSULOSIN HYDROCHLORIDE 0.4 MG/1
0.4 CAPSULE ORAL DAILY
Status: DISCONTINUED | OUTPATIENT
Start: 2025-04-06 | End: 2025-04-06

## 2025-04-06 RX ORDER — PHENYLEPHRINE HCL IN 0.9% NACL 1 MG/10 ML
SYRINGE (ML) INTRAVENOUS
Status: DISCONTINUED | OUTPATIENT
Start: 2025-04-06 | End: 2025-04-06 | Stop reason: SDUPTHER

## 2025-04-06 RX ORDER — ALBUTEROL SULFATE 90 UG/1
2 INHALANT RESPIRATORY (INHALATION) EVERY 6 HOURS PRN
Status: DISCONTINUED | OUTPATIENT
Start: 2025-04-06 | End: 2025-04-08 | Stop reason: HOSPADM

## 2025-04-06 RX ORDER — INSULIN LISPRO 100 [IU]/ML
0-8 INJECTION, SOLUTION INTRAVENOUS; SUBCUTANEOUS
Status: DISCONTINUED | OUTPATIENT
Start: 2025-04-06 | End: 2025-04-08 | Stop reason: HOSPADM

## 2025-04-06 RX ORDER — TROSPIUM CHLORIDE 20 MG/1
20 TABLET, FILM COATED ORAL
Status: DISCONTINUED | OUTPATIENT
Start: 2025-04-07 | End: 2025-04-08 | Stop reason: HOSPADM

## 2025-04-06 RX ORDER — ONDANSETRON 2 MG/ML
4 INJECTION INTRAMUSCULAR; INTRAVENOUS ONCE
Status: COMPLETED | OUTPATIENT
Start: 2025-04-06 | End: 2025-04-06

## 2025-04-06 RX ORDER — LIDOCAINE HYDROCHLORIDE 10 MG/ML
INJECTION, SOLUTION EPIDURAL; INFILTRATION; INTRACAUDAL; PERINEURAL
Status: DISCONTINUED | OUTPATIENT
Start: 2025-04-06 | End: 2025-04-06 | Stop reason: SDUPTHER

## 2025-04-06 RX ORDER — DEXMEDETOMIDINE HYDROCHLORIDE 100 UG/ML
INJECTION, SOLUTION INTRAVENOUS
Status: DISCONTINUED | OUTPATIENT
Start: 2025-04-06 | End: 2025-04-06 | Stop reason: SDUPTHER

## 2025-04-06 RX ORDER — 0.9 % SODIUM CHLORIDE 0.9 %
1000 INTRAVENOUS SOLUTION INTRAVENOUS ONCE
Status: COMPLETED | OUTPATIENT
Start: 2025-04-06 | End: 2025-04-06

## 2025-04-06 RX ORDER — HYDRALAZINE HYDROCHLORIDE 20 MG/ML
10 INJECTION INTRAMUSCULAR; INTRAVENOUS
Status: DISCONTINUED | OUTPATIENT
Start: 2025-04-06 | End: 2025-04-06 | Stop reason: HOSPADM

## 2025-04-06 RX ORDER — PHENAZOPYRIDINE HYDROCHLORIDE 100 MG/1
200 TABLET, FILM COATED ORAL ONCE
Status: COMPLETED | OUTPATIENT
Start: 2025-04-06 | End: 2025-04-06

## 2025-04-06 RX ORDER — SODIUM CHLORIDE, SODIUM LACTATE, POTASSIUM CHLORIDE, CALCIUM CHLORIDE 600; 310; 30; 20 MG/100ML; MG/100ML; MG/100ML; MG/100ML
INJECTION, SOLUTION INTRAVENOUS CONTINUOUS
Status: DISCONTINUED | OUTPATIENT
Start: 2025-04-06 | End: 2025-04-06 | Stop reason: HOSPADM

## 2025-04-06 RX ORDER — DEXTROSE MONOHYDRATE 100 MG/ML
INJECTION, SOLUTION INTRAVENOUS CONTINUOUS PRN
Status: DISCONTINUED | OUTPATIENT
Start: 2025-04-06 | End: 2025-04-08 | Stop reason: HOSPADM

## 2025-04-06 RX ORDER — SODIUM CHLORIDE 0.9 % (FLUSH) 0.9 %
5-40 SYRINGE (ML) INJECTION EVERY 12 HOURS SCHEDULED
Status: DISCONTINUED | OUTPATIENT
Start: 2025-04-06 | End: 2025-04-08 | Stop reason: HOSPADM

## 2025-04-06 RX ORDER — GABAPENTIN 300 MG/1
300 CAPSULE ORAL 2 TIMES DAILY
COMMUNITY

## 2025-04-06 RX ORDER — BUDESONIDE AND FORMOTEROL FUMARATE DIHYDRATE 160; 4.5 UG/1; UG/1
2 AEROSOL RESPIRATORY (INHALATION)
Status: DISCONTINUED | OUTPATIENT
Start: 2025-04-06 | End: 2025-04-08 | Stop reason: HOSPADM

## 2025-04-06 RX ORDER — LIDOCAINE HYDROCHLORIDE 10 MG/ML
1 INJECTION, SOLUTION EPIDURAL; INFILTRATION; INTRACAUDAL; PERINEURAL
Status: DISCONTINUED | OUTPATIENT
Start: 2025-04-06 | End: 2025-04-06 | Stop reason: HOSPADM

## 2025-04-06 RX ORDER — NALOXONE HYDROCHLORIDE 0.4 MG/ML
INJECTION, SOLUTION INTRAMUSCULAR; INTRAVENOUS; SUBCUTANEOUS PRN
Status: DISCONTINUED | OUTPATIENT
Start: 2025-04-06 | End: 2025-04-06 | Stop reason: HOSPADM

## 2025-04-06 RX ORDER — ALPRAZOLAM 0.25 MG
0.25 TABLET ORAL EVERY 12 HOURS PRN
Status: DISCONTINUED | OUTPATIENT
Start: 2025-04-06 | End: 2025-04-08 | Stop reason: HOSPADM

## 2025-04-06 RX ORDER — KETOROLAC TROMETHAMINE 30 MG/ML
30 INJECTION, SOLUTION INTRAMUSCULAR; INTRAVENOUS ONCE
Status: COMPLETED | OUTPATIENT
Start: 2025-04-06 | End: 2025-04-06

## 2025-04-06 RX ORDER — SODIUM CHLORIDE 0.9 % (FLUSH) 0.9 %
5-40 SYRINGE (ML) INJECTION PRN
Status: DISCONTINUED | OUTPATIENT
Start: 2025-04-06 | End: 2025-04-06 | Stop reason: HOSPADM

## 2025-04-06 RX ORDER — HYOSCYAMINE SULFATE 0.12 MG/1
0.12 TABLET SUBLINGUAL ONCE
Status: COMPLETED | OUTPATIENT
Start: 2025-04-06 | End: 2025-04-06

## 2025-04-06 RX ORDER — IPRATROPIUM BROMIDE AND ALBUTEROL SULFATE 2.5; .5 MG/3ML; MG/3ML
1 SOLUTION RESPIRATORY (INHALATION) EVERY 6 HOURS PRN
Status: DISCONTINUED | OUTPATIENT
Start: 2025-04-06 | End: 2025-04-08 | Stop reason: HOSPADM

## 2025-04-06 RX ORDER — ENOXAPARIN SODIUM 100 MG/ML
40 INJECTION SUBCUTANEOUS DAILY
Status: DISCONTINUED | OUTPATIENT
Start: 2025-04-06 | End: 2025-04-08 | Stop reason: HOSPADM

## 2025-04-06 RX ORDER — IOPAMIDOL 408 MG/ML
INJECTION, SOLUTION INTRATHECAL
Status: DISCONTINUED
Start: 2025-04-06 | End: 2025-04-06 | Stop reason: WASHOUT

## 2025-04-06 RX ORDER — SODIUM CHLORIDE 9 MG/ML
INJECTION, SOLUTION INTRAVENOUS PRN
Status: DISCONTINUED | OUTPATIENT
Start: 2025-04-06 | End: 2025-04-06 | Stop reason: HOSPADM

## 2025-04-06 RX ORDER — FENTANYL CITRATE 50 UG/ML
INJECTION, SOLUTION INTRAMUSCULAR; INTRAVENOUS
Status: DISCONTINUED | OUTPATIENT
Start: 2025-04-06 | End: 2025-04-06 | Stop reason: SDUPTHER

## 2025-04-06 RX ORDER — SODIUM CHLORIDE 9 MG/ML
INJECTION, SOLUTION INTRAVENOUS CONTINUOUS
Status: DISCONTINUED | OUTPATIENT
Start: 2025-04-06 | End: 2025-04-08 | Stop reason: HOSPADM

## 2025-04-06 RX ORDER — MORPHINE SULFATE 4 MG/ML
4 INJECTION, SOLUTION INTRAMUSCULAR; INTRAVENOUS
Status: DISCONTINUED | OUTPATIENT
Start: 2025-04-06 | End: 2025-04-08 | Stop reason: HOSPADM

## 2025-04-06 RX ORDER — SODIUM CHLORIDE 0.9 % (FLUSH) 0.9 %
5-40 SYRINGE (ML) INJECTION EVERY 12 HOURS SCHEDULED
Status: DISCONTINUED | OUTPATIENT
Start: 2025-04-06 | End: 2025-04-06 | Stop reason: HOSPADM

## 2025-04-06 RX ORDER — ONDANSETRON 4 MG/1
4 TABLET, ORALLY DISINTEGRATING ORAL EVERY 8 HOURS PRN
Status: DISCONTINUED | OUTPATIENT
Start: 2025-04-06 | End: 2025-04-08 | Stop reason: HOSPADM

## 2025-04-06 RX ORDER — PROCHLORPERAZINE EDISYLATE 5 MG/ML
5 INJECTION INTRAMUSCULAR; INTRAVENOUS
Status: DISCONTINUED | OUTPATIENT
Start: 2025-04-06 | End: 2025-04-06 | Stop reason: HOSPADM

## 2025-04-06 RX ORDER — LABETALOL HYDROCHLORIDE 5 MG/ML
10 INJECTION, SOLUTION INTRAVENOUS
Status: DISCONTINUED | OUTPATIENT
Start: 2025-04-06 | End: 2025-04-06 | Stop reason: HOSPADM

## 2025-04-06 RX ORDER — ONDANSETRON 2 MG/ML
4 INJECTION INTRAMUSCULAR; INTRAVENOUS EVERY 6 HOURS PRN
Status: DISCONTINUED | OUTPATIENT
Start: 2025-04-06 | End: 2025-04-08 | Stop reason: HOSPADM

## 2025-04-06 RX ORDER — TAMSULOSIN HYDROCHLORIDE 0.4 MG/1
0.4 CAPSULE ORAL DAILY
Status: DISCONTINUED | OUTPATIENT
Start: 2025-04-06 | End: 2025-04-08 | Stop reason: HOSPADM

## 2025-04-06 RX ORDER — SODIUM CHLORIDE 0.9 % (FLUSH) 0.9 %
5-40 SYRINGE (ML) INJECTION PRN
Status: DISCONTINUED | OUTPATIENT
Start: 2025-04-06 | End: 2025-04-08 | Stop reason: HOSPADM

## 2025-04-06 RX ORDER — GLUCAGON 1 MG/ML
1 KIT INJECTION PRN
Status: DISCONTINUED | OUTPATIENT
Start: 2025-04-06 | End: 2025-04-08 | Stop reason: HOSPADM

## 2025-04-06 RX ORDER — ALBUMIN HUMAN 50 G/1000ML
25 SOLUTION INTRAVENOUS ONCE
Status: COMPLETED | OUTPATIENT
Start: 2025-04-06 | End: 2025-04-06

## 2025-04-06 RX ORDER — ACETAMINOPHEN 325 MG/1
650 TABLET ORAL EVERY 4 HOURS PRN
Status: DISCONTINUED | OUTPATIENT
Start: 2025-04-06 | End: 2025-04-08 | Stop reason: HOSPADM

## 2025-04-06 RX ORDER — PROPOFOL 10 MG/ML
INJECTION, EMULSION INTRAVENOUS
Status: DISCONTINUED | OUTPATIENT
Start: 2025-04-06 | End: 2025-04-06 | Stop reason: SDUPTHER

## 2025-04-06 RX ADMIN — ENOXAPARIN SODIUM 40 MG: 100 INJECTION SUBCUTANEOUS at 14:43

## 2025-04-06 RX ADMIN — Medication 200 MCG: at 13:28

## 2025-04-06 RX ADMIN — PHENAZOPYRIDINE HYDROCHLORIDE 200 MG: 100 TABLET ORAL at 14:43

## 2025-04-06 RX ADMIN — KETOROLAC TROMETHAMINE 30 MG: 30 INJECTION, SOLUTION INTRAMUSCULAR at 11:16

## 2025-04-06 RX ADMIN — ACETAMINOPHEN 650 MG: 325 TABLET ORAL at 20:28

## 2025-04-06 RX ADMIN — TAMSULOSIN HYDROCHLORIDE 0.4 MG: 0.4 CAPSULE ORAL at 14:43

## 2025-04-06 RX ADMIN — Medication 200 MCG: at 13:23

## 2025-04-06 RX ADMIN — DEXMEDETOMIDINE 4 MCG: 100 INJECTION, SOLUTION INTRAVENOUS at 13:05

## 2025-04-06 RX ADMIN — DEXMEDETOMIDINE 8 MCG: 100 INJECTION, SOLUTION INTRAVENOUS at 13:01

## 2025-04-06 RX ADMIN — HYOSCYAMINE SULFATE 0.12 MG: 0.12 TABLET SUBLINGUAL at 14:43

## 2025-04-06 RX ADMIN — PROPOFOL 100 MCG/KG/MIN: 10 INJECTION, EMULSION INTRAVENOUS at 13:05

## 2025-04-06 RX ADMIN — ONDANSETRON 4 MG: 2 INJECTION INTRAMUSCULAR; INTRAVENOUS at 11:16

## 2025-04-06 RX ADMIN — Medication 200 MCG: at 13:26

## 2025-04-06 RX ADMIN — Medication 200 MCG: at 13:17

## 2025-04-06 RX ADMIN — FENTANYL CITRATE 50 MCG: 50 INJECTION, SOLUTION INTRAMUSCULAR; INTRAVENOUS at 13:01

## 2025-04-06 RX ADMIN — SODIUM CHLORIDE: 0.9 INJECTION, SOLUTION INTRAVENOUS at 14:31

## 2025-04-06 RX ADMIN — ONDANSETRON 4 MG: 2 INJECTION, SOLUTION INTRAMUSCULAR; INTRAVENOUS at 20:28

## 2025-04-06 RX ADMIN — LIDOCAINE HYDROCHLORIDE 50 MG: 10 INJECTION, SOLUTION EPIDURAL; INFILTRATION; INTRACAUDAL; PERINEURAL at 13:05

## 2025-04-06 RX ADMIN — FENTANYL CITRATE 50 MCG: 50 INJECTION, SOLUTION INTRAMUSCULAR; INTRAVENOUS at 13:05

## 2025-04-06 RX ADMIN — MORPHINE SULFATE 4 MG: 4 INJECTION, SOLUTION INTRAMUSCULAR; INTRAVENOUS at 22:26

## 2025-04-06 RX ADMIN — PROPOFOL 50 MG: 10 INJECTION, EMULSION INTRAVENOUS at 13:06

## 2025-04-06 RX ADMIN — SODIUM CHLORIDE 1000 ML: 9 INJECTION, SOLUTION INTRAVENOUS at 12:11

## 2025-04-06 RX ADMIN — ALBUMIN (HUMAN) 25 G: 12.5 INJECTION, SOLUTION INTRAVENOUS at 13:40

## 2025-04-06 RX ADMIN — INSULIN LISPRO 2 UNITS: 100 INJECTION, SOLUTION INTRAVENOUS; SUBCUTANEOUS at 20:28

## 2025-04-06 RX ADMIN — CEFTRIAXONE SODIUM 1000 MG: 1 INJECTION, POWDER, FOR SOLUTION INTRAMUSCULAR; INTRAVENOUS at 12:11

## 2025-04-06 RX ADMIN — SODIUM CHLORIDE: 0.9 INJECTION, SOLUTION INTRAVENOUS at 21:26

## 2025-04-06 RX ADMIN — SODIUM CHLORIDE: 9 INJECTION, SOLUTION INTRAVENOUS at 13:01

## 2025-04-06 ASSESSMENT — PAIN DESCRIPTION - DESCRIPTORS: DESCRIPTORS: ACHING

## 2025-04-06 ASSESSMENT — PAIN SCALES - GENERAL
PAINLEVEL_OUTOF10: 0
PAINLEVEL_OUTOF10: 8

## 2025-04-06 ASSESSMENT — PAIN - FUNCTIONAL ASSESSMENT
PAIN_FUNCTIONAL_ASSESSMENT: 0-10
PAIN_FUNCTIONAL_ASSESSMENT: 0-10

## 2025-04-06 ASSESSMENT — PAIN SCALES - WONG BAKER
WONGBAKER_NUMERICALRESPONSE: NO HURT
WONGBAKER_NUMERICALRESPONSE: NO HURT

## 2025-04-06 NOTE — H&P
sugars are well-controlled usually in the low 100s  GI and DVT prophylaxis  Continue efforts for pain control  Antiemetics as ordered  Monitor renal function, avoid nephrotoxic agents.  Correct electrolytes as needed  Activity as tolerated  Further recommendation plans pending cystoscopy findings, stent placement excetra    Consultations:   None        Gerardo Blanchard DO  4/6/2025  12:53 PM    Copy sent to Brent Nicolas MD

## 2025-04-06 NOTE — OP NOTE
Operative Note      Patient: Radha Garcia  YOB: 1970  MRN: [id number]    Date of Procedure: 4/6/2025    Pre-Op Diagnosis Codes:      * Left ureteral calculus [N20.1]  UTI  DANIEL    Post-Op Diagnosis: Same       Procedure(s):  CYSTOSCOPY LEFT URETERAL STENT INSERTION    Surgeon(s):  Aamir Rankin MD    Assistant:   * No surgical staff found *    Anesthesia: Monitor Anesthesia Care    Estimated Blood Loss (mL): Minimal    Complications: None    Specimens:   * No specimens in log *    Implants:  * No implants in log *      Drains: * No LDAs found *    Findings:  Infection Present At Time Of Surgery (PATOS) (choose all levels that have infection present):  UTI present  Other Findings: see below     Detailed Description of Procedure:   INDICATIONS:  This is a 54 y.o. female presents today for a cystoscopy and stent placement to relieve the patient's ureteral obstruction due to a 1 cm Left UPJ calculus in the setting of infection and DANIEL.  After risks, benefits and alternatives of the procedure were discussed with the patient, informed consent was obtained and the patient elected to proceed.  The patient was given Rocephin (ceftriaxone) 1 gm IV in ER prior to transporting to OR for antibiotic prophylaxis. Patient had EPC cuffs placed for VTE prophylaxis.    DETAILS OF PROCEDURE:  The patient was prepped and draped in the usual sterile fashion after being placed in dorsolithotomy position. A proper timeout was performed.  A rigid cystoscope with a 22 Maltese sheath with 30 degree lens was advanced down the patient's urethra and into the bladder without difficulty. Once inside the bladder, we then focused our attention on the Left ureteral orifice.  A glide wire was advanced up to the renal pelvis under fluoroscopic guidance.  A 6F by 24 cm stent was advanced over the wire without difficulty.  The attached string was removed.  Once the stent was inserted, purulent urine could be seen coming from the

## 2025-04-06 NOTE — CONSULTS
[facility] Urology  Aamir Rankin MD [facility]    Urology Consultation    Patient:  Radha Garcia  MRN: [id number]  YOB: 1970  Consult requested from Dr. Caleb Blanchard for purpose of evaluation of  left proximal ureteral calculus and UTI.    CHIEF COMPLAINT:  left flank pain, nausea and vomiting    HISTORY OF PRESENT ILLNESS:   The patient is a 54 y.o. female who presents with:  Ureteral calculus:  Patient is here today for a ureteral calculus which was first noted 1-2 day(s) ago.    Location: Left UPJ  Size: 10 mm  Recently the ureteral calculus symptoms: show no change  Current medical Rx for ureteral calculus: IVF and pain meds  Flank pain? Yes, left  Hematuria?  No gross hematuria  Passed recent calculus? No  Stone composition: unknown     Patient's old records, notes and chart reviewed and summarized above.    Past Medical History:    Past Medical History:   Diagnosis Date    Acute exacerbation of chronic obstructive pulmonary disease (HCC) 6/14/2021    Acute hypoxemic respiratory failure 6/15/2021    ADD (attention deficit disorder)     Arrhythmia     SVT then had an ablation    Asthma     Back pain     Bilateral nephrolithiasis 1/10/2018    Bronchitis     Caffeine use     6 cola / day -Excessive use    Cholelithiasis     COPD (chronic obstructive pulmonary disease) ([facility])     COPD with acute bronchitis ([facility])     Depression     Diabetes mellitus ([facility])     Diabetes mellitus type 2, noninsulin dependent ([facility])     Diarrhea     Frequency of urination 1/10/2018    GERD (gastroesophageal reflux disease)     Hyperlipidemia     Hypoglycemia     Hypoxia 6/14/2021    Kidney infection     Kidney stone     Kidney stones     Obesity (BMI 30-39.9) 6/15/2021    ATUL (obstructive sleep apnea) 6/15/2021    Not compliant     Pneumonia     Sleep apnea     no cpap or bipap    Tobacco abuse 6/15/2021    Type 2 diabetes mellitus with hyperglycemia, with long-term current use of insulin ([facility]) 6/24/2016

## 2025-04-06 NOTE — ED PROVIDER NOTES
8.2 8.0 - 14.0 fL    Neutrophils % 89 (H) 36 - 66 %    Lymphocytes % 6 (L) 24 - 44 %    Monocytes % 5 1 - 7 %    Eosinophils % 0 (L) 1 - 4 %    Basophils % 0 0 - 2 %    Neutrophils Absolute 26.25 (H) 1.8 - 7.7 k/uL    Lymphocytes Absolute 1.77 1.0 - 4.8 k/uL    Monocytes Absolute 1.48 (H) 0.1 - 0.8 k/uL    Eosinophils Absolute 0.00 0.0 - 0.4 k/uL    Basophils Absolute 0.00 0.0 - 0.2 k/uL    Morphology Normal    Microscopic Urinalysis   Result Value Ref Range    WBC, UA TOO NUMEROUS TO COUNT 0 - 5 /HPF    RBC, UA 20 TO 50 0 - 2 /HPF    Epithelial Cells, UA 10 TO 20 0 - 5 /HPF    Bacteria, UA FEW (A) None    Other Observations UA Culture ordered based on defined criteria. (A) NOT REQ.   Comprehensive Metabolic Panel   Result Value Ref Range    Sodium 131 (L) 135 - 144 mmol/L    Potassium 3.8 3.7 - 5.3 mmol/L    Chloride 92 (L) 98 - 107 mmol/L    CO2 28 20 - 31 mmol/L    Anion Gap 11 9 - 17 mmol/L    Glucose 181 (H) 70 - 99 mg/dL    BUN 17 6 - 20 mg/dL    Creatinine 1.3 (H) 0.5 - 0.9 mg/dL    Est, Glom Filt Rate 48 (L) >60 mL/min/1.73m2    Calcium 8.7 8.6 - 10.4 mg/dL    Total Protein 6.9 6.4 - 8.3 g/dL    Albumin 3.3 (L) 3.5 - 5.2 g/dL    Albumin/Globulin Ratio 0.9 (L) 1.0 - 2.5    Total Bilirubin 1.2 0.3 - 1.2 mg/dL    Alkaline Phosphatase 108 (H) 35 - 104 U/L    ALT <5 (L) 5 - 33 U/L    AST 7 <32 U/L   Lipase   Result Value Ref Range    Lipase 21 13 - 60 U/L   Magnesium   Result Value Ref Range    Magnesium 1.5 (L) 1.6 - 2.6 mg/dL   Lactate, Sepsis   Result Value Ref Range    Lactic Acid, Sepsis 1.9 0.5 - 1.9 mmol/L   POC Glucose Fingerstick   Result Value Ref Range    POC Glucose 165 (H) 65 - 105 mg/dL       EMERGENCY DEPARTMENT COURSE:     The patient was given the following medications:  Orders Placed This Encounter   Medications    ondansetron (ZOFRAN) injection 4 mg    ketorolac (TORADOL) injection 30 mg    cefTRIAXone (ROCEPHIN) 1,000 mg in sterile water 10 mL IV syringe     Antimicrobial Indications:

## 2025-04-07 LAB
ANION GAP SERPL CALCULATED.3IONS-SCNC: 9 MMOL/L (ref 9–17)
BASOPHILS # BLD: 0.1 K/UL (ref 0–0.2)
BASOPHILS NFR BLD: 0 % (ref 0–2)
BUN SERPL-MCNC: 12 MG/DL (ref 6–20)
CALCIUM SERPL-MCNC: 7.4 MG/DL (ref 8.6–10.4)
CHLORIDE SERPL-SCNC: 101 MMOL/L (ref 98–107)
CO2 SERPL-SCNC: 24 MMOL/L (ref 20–31)
CREAT SERPL-MCNC: 0.9 MG/DL (ref 0.5–0.9)
EOSINOPHIL # BLD: 0.2 K/UL (ref 0–0.4)
EOSINOPHILS RELATIVE PERCENT: 1 % (ref 1–4)
ERYTHROCYTE [DISTWIDTH] IN BLOOD BY AUTOMATED COUNT: 13.5 % (ref 12.5–15.4)
GFR, ESTIMATED: 75 ML/MIN/1.73M2
GLUCOSE BLD-MCNC: 107 MG/DL (ref 65–105)
GLUCOSE BLD-MCNC: 119 MG/DL (ref 65–105)
GLUCOSE BLD-MCNC: 153 MG/DL (ref 65–105)
GLUCOSE BLD-MCNC: 81 MG/DL (ref 65–105)
GLUCOSE BLD-MCNC: 90 MG/DL (ref 65–105)
GLUCOSE SERPL-MCNC: 123 MG/DL (ref 70–99)
HCT VFR BLD AUTO: 30.6 % (ref 36–46)
HGB BLD-MCNC: 10.3 G/DL (ref 12–16)
LYMPHOCYTES NFR BLD: 1.7 K/UL (ref 1–4.8)
LYMPHOCYTES RELATIVE PERCENT: 11 % (ref 24–44)
MAGNESIUM SERPL-MCNC: 1.6 MG/DL (ref 1.6–2.6)
MCH RBC QN AUTO: 29.6 PG (ref 26–34)
MCHC RBC AUTO-ENTMCNC: 33.5 G/DL (ref 31–37)
MCV RBC AUTO: 88.2 FL (ref 80–100)
MICROORGANISM SPEC CULT: NORMAL
MONOCYTES NFR BLD: 0.6 K/UL (ref 0.1–1.2)
MONOCYTES NFR BLD: 4 % (ref 2–11)
NEUTROPHILS NFR BLD: 84 % (ref 36–66)
NEUTS SEG NFR BLD: 13.9 K/UL (ref 1.8–7.7)
PLATELET # BLD AUTO: 166 K/UL (ref 140–450)
PMV BLD AUTO: 6.4 FL (ref 6–12)
POTASSIUM SERPL-SCNC: 3.4 MMOL/L (ref 3.7–5.3)
RBC # BLD AUTO: 3.47 M/UL (ref 4–5.2)
SODIUM SERPL-SCNC: 134 MMOL/L (ref 135–144)
SPECIMEN DESCRIPTION: NORMAL
WBC OTHER # BLD: 16.5 K/UL (ref 3.5–11)

## 2025-04-07 PROCEDURE — 36415 COLL VENOUS BLD VENIPUNCTURE: CPT

## 2025-04-07 PROCEDURE — 96361 HYDRATE IV INFUSION ADD-ON: CPT

## 2025-04-07 PROCEDURE — 82947 ASSAY GLUCOSE BLOOD QUANT: CPT

## 2025-04-07 PROCEDURE — G0378 HOSPITAL OBSERVATION PER HR: HCPCS

## 2025-04-07 PROCEDURE — 94761 N-INVAS EAR/PLS OXIMETRY MLT: CPT

## 2025-04-07 PROCEDURE — 2500000003 HC RX 250 WO HCPCS: Performed by: INTERNAL MEDICINE

## 2025-04-07 PROCEDURE — 99232 SBSQ HOSP IP/OBS MODERATE 35: CPT | Performed by: INTERNAL MEDICINE

## 2025-04-07 PROCEDURE — 94640 AIRWAY INHALATION TREATMENT: CPT

## 2025-04-07 PROCEDURE — 80048 BASIC METABOLIC PNL TOTAL CA: CPT

## 2025-04-07 PROCEDURE — 85025 COMPLETE CBC W/AUTO DIFF WBC: CPT

## 2025-04-07 PROCEDURE — 83735 ASSAY OF MAGNESIUM: CPT

## 2025-04-07 PROCEDURE — 96375 TX/PRO/DX INJ NEW DRUG ADDON: CPT

## 2025-04-07 PROCEDURE — 6370000000 HC RX 637 (ALT 250 FOR IP): Performed by: INTERNAL MEDICINE

## 2025-04-07 PROCEDURE — 96372 THER/PROPH/DIAG INJ SC/IM: CPT

## 2025-04-07 PROCEDURE — 6360000002 HC RX W HCPCS: Performed by: INTERNAL MEDICINE

## 2025-04-07 PROCEDURE — 96376 TX/PRO/DX INJ SAME DRUG ADON: CPT

## 2025-04-07 PROCEDURE — 6370000000 HC RX 637 (ALT 250 FOR IP): Performed by: SPECIALIST

## 2025-04-07 PROCEDURE — 2580000003 HC RX 258: Performed by: INTERNAL MEDICINE

## 2025-04-07 RX ORDER — POTASSIUM CHLORIDE 7.45 MG/ML
10 INJECTION INTRAVENOUS PRN
Status: DISCONTINUED | OUTPATIENT
Start: 2025-04-07 | End: 2025-04-08 | Stop reason: HOSPADM

## 2025-04-07 RX ORDER — MAGNESIUM HYDROXIDE/ALUMINUM HYDROXICE/SIMETHICONE 120; 1200; 1200 MG/30ML; MG/30ML; MG/30ML
30 SUSPENSION ORAL EVERY 6 HOURS PRN
Status: DISCONTINUED | OUTPATIENT
Start: 2025-04-07 | End: 2025-04-08 | Stop reason: HOSPADM

## 2025-04-07 RX ORDER — MAGNESIUM SULFATE 1 G/100ML
1000 INJECTION INTRAVENOUS PRN
Status: DISCONTINUED | OUTPATIENT
Start: 2025-04-07 | End: 2025-04-08 | Stop reason: HOSPADM

## 2025-04-07 RX ORDER — 0.9 % SODIUM CHLORIDE 0.9 %
500 INTRAVENOUS SOLUTION INTRAVENOUS ONCE
Status: COMPLETED | OUTPATIENT
Start: 2025-04-07 | End: 2025-04-07

## 2025-04-07 RX ORDER — POTASSIUM CHLORIDE 1500 MG/1
40 TABLET, EXTENDED RELEASE ORAL ONCE
Status: COMPLETED | OUTPATIENT
Start: 2025-04-07 | End: 2025-04-07

## 2025-04-07 RX ORDER — FLUTICASONE PROPIONATE AND SALMETEROL 250; 50 UG/1; UG/1
1 POWDER RESPIRATORY (INHALATION) EVERY 12 HOURS
Qty: 60 EACH | Refills: 3 | Status: SHIPPED | OUTPATIENT
Start: 2025-04-07

## 2025-04-07 RX ORDER — POTASSIUM CHLORIDE 1500 MG/1
40 TABLET, EXTENDED RELEASE ORAL PRN
Status: DISCONTINUED | OUTPATIENT
Start: 2025-04-07 | End: 2025-04-08 | Stop reason: HOSPADM

## 2025-04-07 RX ADMIN — MORPHINE SULFATE 4 MG: 4 INJECTION, SOLUTION INTRAMUSCULAR; INTRAVENOUS at 20:14

## 2025-04-07 RX ADMIN — WATER 1000 MG: 1 INJECTION INTRAMUSCULAR; INTRAVENOUS; SUBCUTANEOUS at 11:21

## 2025-04-07 RX ADMIN — POTASSIUM CHLORIDE 40 MEQ: 1500 TABLET, EXTENDED RELEASE ORAL at 09:18

## 2025-04-07 RX ADMIN — SODIUM CHLORIDE, PRESERVATIVE FREE 10 ML: 5 INJECTION INTRAVENOUS at 11:20

## 2025-04-07 RX ADMIN — ENOXAPARIN SODIUM 40 MG: 100 INJECTION SUBCUTANEOUS at 15:15

## 2025-04-07 RX ADMIN — BUDESONIDE AND FORMOTEROL FUMARATE DIHYDRATE 2 PUFF: 160; 4.5 AEROSOL RESPIRATORY (INHALATION) at 08:15

## 2025-04-07 RX ADMIN — MORPHINE SULFATE 4 MG: 4 INJECTION, SOLUTION INTRAMUSCULAR; INTRAVENOUS at 05:21

## 2025-04-07 RX ADMIN — ALUMINUM HYDROXIDE, MAGNESIUM HYDROXIDE, AND SIMETHICONE 30 ML: 200; 200; 20 SUSPENSION ORAL at 22:05

## 2025-04-07 RX ADMIN — MORPHINE SULFATE 4 MG: 4 INJECTION, SOLUTION INTRAMUSCULAR; INTRAVENOUS at 11:21

## 2025-04-07 RX ADMIN — SODIUM CHLORIDE: 0.9 INJECTION, SOLUTION INTRAVENOUS at 03:56

## 2025-04-07 RX ADMIN — SODIUM CHLORIDE 500 ML: 0.9 INJECTION, SOLUTION INTRAVENOUS at 09:20

## 2025-04-07 RX ADMIN — TROSPIUM CHLORIDE 20 MG: 20 TABLET, FILM COATED ORAL at 05:21

## 2025-04-07 RX ADMIN — TAMSULOSIN HYDROCHLORIDE 0.4 MG: 0.4 CAPSULE ORAL at 09:18

## 2025-04-07 RX ADMIN — TROSPIUM CHLORIDE 20 MG: 20 TABLET, FILM COATED ORAL at 15:15

## 2025-04-07 RX ADMIN — SODIUM CHLORIDE: 0.9 INJECTION, SOLUTION INTRAVENOUS at 18:25

## 2025-04-07 RX ADMIN — SODIUM CHLORIDE: 0.9 INJECTION, SOLUTION INTRAVENOUS at 11:25

## 2025-04-07 ASSESSMENT — PAIN DESCRIPTION - ORIENTATION: ORIENTATION: LEFT

## 2025-04-07 ASSESSMENT — PAIN SCALES - WONG BAKER: WONGBAKER_NUMERICALRESPONSE: NO HURT

## 2025-04-07 ASSESSMENT — PAIN SCALES - GENERAL: PAINLEVEL_OUTOF10: 2

## 2025-04-07 ASSESSMENT — PAIN DESCRIPTION - LOCATION: LOCATION: BACK

## 2025-04-07 NOTE — PLAN OF CARE
Problem: Pain  Goal: Verbalizes/displays adequate comfort level or baseline comfort level  4/7/2025 0740 by Sharee Melo RN  Outcome: Progressing     Problem: Chronic Conditions and Co-morbidities  Goal: Patient's chronic conditions and co-morbidity symptoms are monitored and maintained or improved  4/7/2025 0740 by Sharee Melo RN  Outcome: Progressing     Problem: Discharge Planning  Goal: Discharge to home or other facility with appropriate resources  4/7/2025 0740 by Sharee Melo RN  Outcome: Progressing     Problem: Genitourinary - Adult  Goal: Absence of urinary retention  4/7/2025 0740 by Sharee Melo RN  Outcome: Progressing

## 2025-04-07 NOTE — PLAN OF CARE
Problem: Respiratory - Adult  Goal: Achieves optimal ventilation and oxygenation  Flowsheets (Taken 4/7/2025 1564)  Achieves optimal ventilation and oxygenation:   Assess for changes in respiratory status   Respiratory therapy support as indicated   Oxygen supplementation based on oxygen saturation or arterial blood gases   Assess and instruct to report shortness of breath or any respiratory difficulty

## 2025-04-07 NOTE — PLAN OF CARE
Problem: Pain  Goal: Verbalizes/displays adequate comfort level or baseline comfort level  Outcome: Progressing     Problem: Chronic Conditions and Co-morbidities  Goal: Patient's chronic conditions and co-morbidity symptoms are monitored and maintained or improved  Outcome: Progressing     Problem: Discharge Planning  Goal: Discharge to home or other facility with appropriate resources  Outcome: Progressing     Problem: Genitourinary - Adult  Goal: Absence of urinary retention  Outcome: Progressing

## 2025-04-08 VITALS
RESPIRATION RATE: 16 BRPM | TEMPERATURE: 99 F | DIASTOLIC BLOOD PRESSURE: 82 MMHG | WEIGHT: 234.57 LBS | HEIGHT: 64 IN | HEART RATE: 84 BPM | OXYGEN SATURATION: 96 % | BODY MASS INDEX: 40.05 KG/M2 | SYSTOLIC BLOOD PRESSURE: 144 MMHG

## 2025-04-08 LAB — GLUCOSE BLD-MCNC: 122 MG/DL (ref 65–105)

## 2025-04-08 PROCEDURE — 6360000002 HC RX W HCPCS: Performed by: INTERNAL MEDICINE

## 2025-04-08 PROCEDURE — 6370000000 HC RX 637 (ALT 250 FOR IP): Performed by: INTERNAL MEDICINE

## 2025-04-08 PROCEDURE — G0378 HOSPITAL OBSERVATION PER HR: HCPCS

## 2025-04-08 PROCEDURE — 96361 HYDRATE IV INFUSION ADD-ON: CPT

## 2025-04-08 PROCEDURE — 99232 SBSQ HOSP IP/OBS MODERATE 35: CPT | Performed by: SPECIALIST

## 2025-04-08 PROCEDURE — 94640 AIRWAY INHALATION TREATMENT: CPT

## 2025-04-08 PROCEDURE — 82947 ASSAY GLUCOSE BLOOD QUANT: CPT

## 2025-04-08 PROCEDURE — 6370000000 HC RX 637 (ALT 250 FOR IP): Performed by: SPECIALIST

## 2025-04-08 PROCEDURE — 96376 TX/PRO/DX INJ SAME DRUG ADON: CPT

## 2025-04-08 PROCEDURE — 2580000003 HC RX 258: Performed by: INTERNAL MEDICINE

## 2025-04-08 PROCEDURE — 2500000003 HC RX 250 WO HCPCS: Performed by: INTERNAL MEDICINE

## 2025-04-08 RX ORDER — CEFADROXIL 500 MG/1
500 CAPSULE ORAL 2 TIMES DAILY
Qty: 20 CAPSULE | Refills: 0 | Status: SHIPPED | OUTPATIENT
Start: 2025-04-08 | End: 2025-04-18

## 2025-04-08 RX ORDER — HYDROCODONE BITARTRATE AND ACETAMINOPHEN 5; 325 MG/1; MG/1
1 TABLET ORAL EVERY 6 HOURS PRN
Qty: 12 TABLET | Refills: 0 | Status: SHIPPED | OUTPATIENT
Start: 2025-04-08 | End: 2025-04-11

## 2025-04-08 RX ORDER — LOSARTAN POTASSIUM 25 MG/1
25 TABLET ORAL DAILY
Qty: 30 TABLET | Refills: 0 | Status: SHIPPED | OUTPATIENT
Start: 2025-04-10 | End: 2025-05-10

## 2025-04-08 RX ORDER — TROSPIUM CHLORIDE 20 MG/1
20 TABLET, FILM COATED ORAL
Qty: 60 TABLET | Refills: 3 | Status: SHIPPED | OUTPATIENT
Start: 2025-04-08

## 2025-04-08 RX ORDER — TAMSULOSIN HYDROCHLORIDE 0.4 MG/1
0.4 CAPSULE ORAL DAILY
Qty: 30 CAPSULE | Refills: 3 | Status: SHIPPED | OUTPATIENT
Start: 2025-04-09

## 2025-04-08 RX ADMIN — MORPHINE SULFATE 4 MG: 4 INJECTION, SOLUTION INTRAMUSCULAR; INTRAVENOUS at 09:05

## 2025-04-08 RX ADMIN — TAMSULOSIN HYDROCHLORIDE 0.4 MG: 0.4 CAPSULE ORAL at 09:08

## 2025-04-08 RX ADMIN — SODIUM CHLORIDE: 0.9 INJECTION, SOLUTION INTRAVENOUS at 01:10

## 2025-04-08 RX ADMIN — BUDESONIDE AND FORMOTEROL FUMARATE DIHYDRATE 2 PUFF: 160; 4.5 AEROSOL RESPIRATORY (INHALATION) at 08:17

## 2025-04-08 RX ADMIN — TROSPIUM CHLORIDE 20 MG: 20 TABLET, FILM COATED ORAL at 05:53

## 2025-04-08 RX ADMIN — SODIUM CHLORIDE, PRESERVATIVE FREE 10 ML: 5 INJECTION INTRAVENOUS at 09:09

## 2025-04-08 ASSESSMENT — PAIN DESCRIPTION - LOCATION: LOCATION: FLANK

## 2025-04-08 ASSESSMENT — PAIN - FUNCTIONAL ASSESSMENT: PAIN_FUNCTIONAL_ASSESSMENT: PREVENTS OR INTERFERES SOME ACTIVE ACTIVITIES AND ADLS

## 2025-04-08 ASSESSMENT — PAIN DESCRIPTION - ORIENTATION: ORIENTATION: LEFT

## 2025-04-08 ASSESSMENT — PAIN DESCRIPTION - DESCRIPTORS: DESCRIPTORS: ACHING;JABBING

## 2025-04-08 ASSESSMENT — PAIN SCALES - GENERAL: PAINLEVEL_OUTOF10: 8

## 2025-04-08 NOTE — CARE COORDINATION
[facility] Quality Flow/Interdisciplinary Rounds Progress Note    Quality Flow Rounds held on April 8, 2025 at 0930    Disciplines Attending:  Bedside Nurse, , and Nursing Unit Leadership    Barriers to Discharge: clinical status    Anticipated Discharge Date:   4/8/25    Anticipated Discharge Disposition: Home    [facility] RISK OF UNPLANNED READMISSION 2.0             0 Total Score        Discussed patient goal for the day, patient clinical progression, and barriers to discharge. Possible discharge today. Plan for patient to return home with spouse. No needs from . Has transportation.      Nel Alonzo RN  April 8, 2025  
Yes  Other Identified Issues/Barriers to RETURNING to current housing: Clinical status  Potential Assistance needed at discharge: (P) N/A            Potential DME:    Patient expects to discharge to: (P) House  Plan for transportation at discharge: (P) Self    Financial    Payor: [employer] / Plan: [employer] [address] / Product Type: *No Product type* /     Does insurance require precert for SNF: Yes    Potential assistance Purchasing Medications: (P) No  Meds-to-Beds request:        [id number] [employer] - [facility] [address] - P [phone] - F [phone]  [address]  [facility] [address]  Phone: [phone] Fax: [phone]      Notes:    Factors facilitating achievement of predicted outcomes: Cooperative and Pleasant    Barriers to discharge: Medical complications    Additional Case Management Notes: Patient is from home with  and does not use DME. Patient is independent and plans to return home at discharge. Car is parked in parking lot and patient plans to drive home upon discharge. Patient denies need from .    The Plan for Transition of Care is related to the following treatment goals of Hydronephrosis with renal calculous obstruction [N13.2]  Hydronephrosis with urinary obstruction due to ureteral calculus [N13.2]  Urinary tract infection with hematuria, site unspecified [N39.0, R31.9]    IF APPLICABLE: The Patient and/or patient representative Radha and her family were provided with a choice of provider and agrees with the discharge plan. Freedom of choice list with basic dialogue that supports the patient's individualized plan of care/goals and shares the quality data associated with the providers was provided to: (P) Patient   Patient Representative Name:       The Patient and/or Patient Representative Agree with the Discharge Plan? (P) Yes    Meseret Wakefield RN  Case Management Department

## 2025-04-08 NOTE — DISCHARGE SUMMARY
Radiology:  No results found.    Consultations:    Consults:     Final Specialist Recommendations/Findings:   None      The patient was seen and examined on day of discharge   A&O X 3  CTAB  NSR, NO MRG  Soft abdomen , +BS  No swelling  and pulse palpable      Discharge plan:     Disposition: Home    Physician Follow Up:     Aamir Rankin MD  [address], [address]  [place] [address]  [phone]    Call in 2 week(s)      Brent Swanson MD  [address]  [place] [id number]  [phone]    Follow up         Requiring Further Evaluation/Follow Up POST HOSPITALIZATION/Incidental Findings:     Diet: diabetic diet    Activity: As tolerated    Instructions to Patient:     Discharge Medications:      Medication List        START taking these medications      cefadroxil 500 MG capsule  Commonly known as: DURICEF  Take 1 capsule by mouth 2 times daily for 10 days     fluticasone-salmeterol 250-50 MCG/ACT Aepb diskus inhaler  Commonly known as: ADVAIR  Inhale 1 puff into the lungs in the morning and 1 puff in the evening.     HYDROcodone-acetaminophen 5-325 MG per tablet  Commonly known as: NORCO  Take 1 tablet by mouth every 6 hours as needed for Pain for up to 3 days. Intended supply: 5 days. Take lowest dose possible to manage pain Max Daily Amount: 4 tablets     tamsulosin 0.4 MG capsule  Commonly known as: FLOMAX  Take 1 capsule by mouth daily  Start taking on: April 9, 2025     trospium 20 MG tablet  Commonly known as: SANCTURA  Take 1 tablet by mouth 2 times daily (before meals)            CHANGE how you take these medications      losartan 25 MG tablet  Commonly known as: COZAAR  Take 1 tablet by mouth daily  Start taking on: April 10, 2025  What changed:   how much to take  These instructions start on April 10, 2025. If you are unsure what to do until then, ask your doctor or other care provider.            CONTINUE taking these medications      ALPRAZolam 0.25 MG tablet  Commonly known

## 2025-04-08 NOTE — PLAN OF CARE
Problem: Pain  Goal: Verbalizes/displays adequate comfort level or baseline comfort level  Outcome: Progressing     Problem: Discharge Planning  Goal: Discharge to home or other facility with appropriate resources  Outcome: Progressing  Flowsheets (Taken 4/7/2025 1600 by Maria L Nunez LPN)  Discharge to home or other facility with appropriate resources:   Identify barriers to discharge with patient and caregiver   Arrange for needed discharge resources and transportation as appropriate   Identify discharge learning needs (meds, wound care, etc)   Refer to discharge planning if patient needs post-hospital services based on physician order or complex needs related to functional status, cognitive ability or social support system     Problem: Genitourinary - Adult  Goal: Absence of urinary retention  Outcome: Progressing  Flowsheets (Taken 4/7/2025 1600 by Maria L Nunez LPN)  Absence of urinary retention:   Assess patient’s ability to void and empty bladder   Monitor intake/output and perform bladder scan as needed   Place urinary catheter per Licensed Independent Practitioner order if needed   Discuss with Licensed Independent Practitioner  medications to alleviate retention as needed     Problem: Respiratory - Adult  Goal: Achieves optimal ventilation and oxygenation  Outcome: Progressing  Flowsheets (Taken 4/7/2025 1600 by Maria L Nunez LPN)  Achieves optimal ventilation and oxygenation:   Assess for changes in respiratory status   Assess for changes in mentation and behavior   Position to facilitate oxygenation and minimize respiratory effort   Oxygen supplementation based on oxygen saturation or arterial blood gases

## 2025-04-08 NOTE — DISCHARGE INSTR - DIET

## 2025-04-08 NOTE — PROGRESS NOTES
[facility] Urology  Aamir Rankin MD FACS    Urology Progress Note    Subjective: Patient afebrile since 8 pm last night.  Tolerating ureteral stent OK.    Patient Vitals for the past 24 hrs:   BP Temp Temp src Pulse Resp SpO2 Weight   04/08/25 0905 -- -- -- -- 16 -- --   04/08/25 0809 (!) 144/82 99 °F (37.2 °C) Oral 84 -- 96 % --   04/08/25 0553 -- -- -- -- -- -- 106.4 kg (234 lb 9.1 oz)   04/08/25 0408 -- 99.8 °F (37.7 °C) Oral -- -- -- --   04/07/25 2047 118/71 99.3 °F (37.4 °C) Oral 96 17 96 % --   04/07/25 2044 -- -- -- -- 16 -- --   04/07/25 2012 (!) 163/70 (!) 100.9 °F (38.3 °C) Oral (!) 103 18 94 % --   04/07/25 1552 -- 100.2 °F (37.9 °C) Oral -- -- -- --   04/07/25 1151 -- -- -- -- 16 -- --   04/07/25 1130 121/71 -- -- 93 -- -- --   04/07/25 1120 -- -- -- -- 18 -- --   04/07/25 0931 -- 99.1 °F (37.3 °C) -- -- -- -- --   04/07/25 0915 (!) 124/56 -- -- (!) 101 17 -- --       Intake/Output Summary (Last 24 hours) at 4/8/2025 0906  Last data filed at 4/8/2025 0554  Gross per 24 hour   Intake 5642.35 ml   Output 2350 ml   Net 3292.35 ml       Recent Labs     04/06/25  1108 04/07/25  0602   WBC 29.5* 16.5*   HGB 14.8 10.3*   HCT 44.0 30.6*   MCV 87.5 88.2    166     Recent Labs     04/06/25  1200 04/07/25  0602   * 134*   K 3.8 3.4*   CL 92* 101   CO2 28 24   BUN 17 12   CREATININE 1.3* 0.9       Recent Labs     04/06/25  1108   COLORU Yellow   PHUR 6.0   WBCUA TOO NUMEROUS TO COUNT   RBCUA 20 TO 50   BACTERIA FEW*   LEUKOCYTESUR TRACE*   UROBILINOGEN Normal   BILIRUBINUR NEGATIVE  Verified by ictotest.*       Additional Lab/culture results: All urine and blood cultures negative.    Physical Exam: Patient alert and oriented and in no apparent distress.     Interval Imaging Findings: none    Impression:    Patient Active Problem List   Diagnosis    Bilateral nephrolithiasis    Frequency of urination    Acute exacerbation of chronic obstructive pulmonary disease (HCC)    Hypoxia    
[facility]  Office: [phone]  Farooq Palencia DO, Shabbir Alberto, DO, Gerardo Blanchard DO, Ben Juárez DO, Momo Lan MD, Sabrina Mariano MD, Francis Byrne MD, Kathy Tobar MD,  Meng Gonsalez MD, Jose Gallegos MD, Jeremiah Turner MD,  Cara Krishnamurthy DO, Lidia Skinner MD, Camilo Peña MD, Cody Palencia DO, Samina Villegas MD,  Santana Tomas DO, Tatianna Armando MD, Christy Ferreira MD, Wolf Harrison MD,  Abdiel Hernandez MD, Felicia Young MD, Barry Busby MD, Kwadwo Gallardo MD, Gabriel Mauricio MD, Tj Bruno MD, Juan Hussein DO, Sean Ballard MD, Cara Ferreira MD, Mohsin Reza, MD, Sanjay Hood MD, Shirley Waterhouse, CNP,  Kellie Russell, CNP, Juan Qureshi, CNP,  Lanny Bailey, DNP, Bettina Chacon, CNP, Dianne Wolff, CNP, Ethel Cooper, CNP, Chanel Boyd, CNP, Herlinda Altman, PA-C, Rosa Valdovinos, CNP, Meredith Morales, CNP,  Vanna Starks, CNP, Valentina Mercado, CNP, Raymundo Soares, PA-C, Dory Vargas, CNP,  Myriam Segura, CNS, Judith Kurtz, CNP, Esha Bowie, CNP,   Ирина Wallace, CNP         [facility]   IN-PATIENT SERVICE   [facility]    Progress Note    4/7/2025    7:03 AM    Name:   Radha Garcia  MRN:     [id number]     Acct:      [id number]   Room:   [facility]/337-01   Day:  0  Admit Date:  4/6/2025 10:38 AM    PCP:   Brent Swanson MD  Code Status:  Full Code    Subjective:     C/C:   Chief Complaint   Patient presents with    Flank Pain     Dx kidney stones yesterday at [place] [facility], went home instead of being transferred, here today with persistent pain, last dose percocet 0400 today, pt is only able to dribble     Interval History Status: improved.     Patient is resting in bed denies any complaints of chest pain, shortness of breath, nausea vomiting, fever chills or acute complaints.  Continues to have some mild flank pain.  Has questions regarding lithotripsy, advised lithotripsy will be completed after infection issues are 
Patient stable for discharge.  Patient verbalized understanding of all discharge instructions.  All belongings gathered and carried down to husbands car.  Patient wheeled down to husbands car to be driven home by patient.    
Spiritual Health History and Assessment/Progress Note  [facility]    (P) Follow-up, (P) Emotional distress, (P) Life Adjustments, Adjustment to illness,      Name: Radha Garcia MRN: [id number]    Age: 54 y.o.     Sex: female   Language: English   Mormon: No Buddhism on file   Hydronephrosis with renal calculous obstruction     Date: 4/6/2025            Total Time Calculated: (P) 15 min              Spiritual Assessment continued in [address]        Referral/Consult From: (P) Rounding   Encounter Overview/Reason: (P) Follow-up  Service Provided For: (P) Patient       followed up on Pt. From OR. Pt. Was awake and alert. Pt. Was open to conversation.  provided support and pastoral care and presence. Pt. Expressed concern over procedure in the morning.  provided support and comfort. Will follow up.    [place], Belief, Meaning:   Patient has beliefs or practices that help with coping during difficult times  Family/Friends No family/friends present      Importance and Influence:  Patient has spiritual/personal beliefs that influence decisions regarding their health  Family/Friends No family/friends present    Community:  Patient feels well-supported. Support system includes: Spouse/Partner  Family/Friends feel well-supported. Support system includes: Spouse/Partner    Assessment and Plan of Care:     Patient Interventions include: Facilitated expression of thoughts and feelings and Explored spiritual coping/struggle/distress  Family/Friends Interventions include: No family/friends present    Patient Plan of Care: Spiritual Care available upon further referral  Family/Friends Plan of Care: Spiritual Care available upon further referral    Electronically signed by Chaplain KARLENE on 4/6/2025 at 10:50 PM    04/06/25 6640   Encounter Summary   Encounter Overview/Reason Follow-up   Service Provided For Patient   Referral/Consult From [facility] [place]   Support System 
Spiritual Support   Grief, Loss, and Adjustments   Type Life Adjustments   Assessment/Intervention/Outcome   Assessment Anxious;Coping   Intervention Sustaining Presence/Ministry of presence;Prayer (assurance of)/[place];Discussed meaning/purpose   Outcome Comfort;Coping;Encouraged   Plan and Referrals   Plan/Referrals Continue to visit, (comment)

## 2025-04-09 ENCOUNTER — PREP FOR PROCEDURE (OUTPATIENT)
Age: 55
End: 2025-04-09

## 2025-04-10 ENCOUNTER — TELEPHONE (OUTPATIENT)
Age: 55
End: 2025-04-10

## 2025-04-10 NOTE — TELEPHONE ENCOUNTER
Patient called.  She said she doesn't seem to be urinating much.  She is drinking 6 32oz of water a day.  She has dry moouth, some chills and sweating.  , vivid dreams and unable to sleep.  She was discharged from [facility] 4/7/  Next procedure is scheduled for 4/22.  Please advise

## 2025-04-11 LAB
MICROORGANISM SPEC CULT: NORMAL
MICROORGANISM SPEC CULT: NORMAL
SERVICE CMNT-IMP: NORMAL
SERVICE CMNT-IMP: NORMAL
SPECIMEN DESCRIPTION: NORMAL
SPECIMEN DESCRIPTION: NORMAL

## 2025-04-18 RX ORDER — CEPHALEXIN 500 MG/1
CAPSULE ORAL
Status: ON HOLD | COMMUNITY
Start: 2025-04-06 | End: 2025-04-22 | Stop reason: HOSPADM

## 2025-04-18 RX ORDER — TRAZODONE HYDROCHLORIDE 100 MG/1
100 TABLET ORAL NIGHTLY
COMMUNITY
Start: 2025-03-04

## 2025-04-18 RX ORDER — OXYCODONE AND ACETAMINOPHEN 5; 325 MG/1; MG/1
1 TABLET ORAL 2 TIMES DAILY PRN
COMMUNITY
Start: 2025-03-31

## 2025-04-18 RX ORDER — ONDANSETRON 4 MG/1
TABLET, ORALLY DISINTEGRATING ORAL
COMMUNITY
Start: 2025-04-06

## 2025-04-21 NOTE — H&P
[facility] Urology  Aamir Rankin MD [facility]    History & Physical    Patient:  Radha Garcia  MRN: [id number]  YOB: 1970    CHIEF COMPLAINT: Left UPJ stone    HISTORY OF PRESENT ILLNESS:   The patient is a 54 y.o. female who presents with left ureteral stone.  She was seen in the ED due to 10 mm left UPJ stone with hydronephrosis, in the setting of DANIEL and UTI.  She underwent urgent stent placement on 2025, now presenting for definitive stone treatment.    Patient's old records, notes and chart reviewed and summarized above.     Past Medical History:    Past Medical History:   Diagnosis Date    Acute exacerbation of chronic obstructive pulmonary disease (HCC) 2021    Acute hypoxemic respiratory failure 06/15/2021    ADD (attention deficit disorder)     Arrhythmia     SVT then had an ablation    Asthma     Back pain     Bilateral nephrolithiasis 01/10/2018    Bronchitis     Caffeine use     6 cola / day -Excessive use    Cholelithiasis     COPD with acute bronchitis ([facility])     Depression     Diabetes mellitus type 2, noninsulin dependent ([facility])     Diarrhea     Frequency of urination 01/10/2018    GERD (gastroesophageal reflux disease)     Hyperlipidemia     Hypoglycemia     Hypoxia 2021    Kidney infection     Kidney stones     Obesity (BMI 30-39.9) 06/15/2021    Personal history of other medical treatment     Permanent Braclet    Pneumonia     Prolonged emergence from general anesthesia     Sleep apnea     no cpap or bipap    Tobacco abuse 06/15/2021    Type 2 diabetes mellitus with hyperglycemia, with long-term current use of insulin ([facility]) 2016    Under care of team 2025    PCP: Brent Swanson MD, [facility], last visit 2025    Wears dentures     Wears glasses        Past Surgical History:    Past Surgical History:   Procedure Laterality Date     SECTION   &     CHOLECYSTECTOMY      COLONOSCOPY  2016    CYSTOSCOPY      W/

## 2025-04-22 ENCOUNTER — ANESTHESIA EVENT (OUTPATIENT)
Dept: OPERATING ROOM | Age: 55
End: 2025-04-22
Payer: COMMERCIAL

## 2025-04-22 ENCOUNTER — HOSPITAL ENCOUNTER (OUTPATIENT)
Age: 55
Setting detail: OUTPATIENT SURGERY
Discharge: HOME | End: 2025-04-22
Attending: SPECIALIST | Admitting: SPECIALIST
Payer: COMMERCIAL

## 2025-04-22 ENCOUNTER — ANESTHESIA (OUTPATIENT)
Dept: OPERATING ROOM | Age: 55
End: 2025-04-22
Payer: COMMERCIAL

## 2025-04-22 ENCOUNTER — APPOINTMENT (OUTPATIENT)
Dept: GENERAL RADIOLOGY | Age: 55
End: 2025-04-22
Attending: SPECIALIST
Payer: COMMERCIAL

## 2025-04-22 VITALS
HEIGHT: 64 IN | OXYGEN SATURATION: 99 % | DIASTOLIC BLOOD PRESSURE: 81 MMHG | RESPIRATION RATE: 16 BRPM | SYSTOLIC BLOOD PRESSURE: 125 MMHG | WEIGHT: 215 LBS | HEART RATE: 76 BPM | BODY MASS INDEX: 36.7 KG/M2 | TEMPERATURE: 97.2 F

## 2025-04-22 DIAGNOSIS — N20.1 CALCULUS OF PROXIMAL LEFT URETER: ICD-10-CM

## 2025-04-22 DIAGNOSIS — N20.0 BILATERAL NEPHROLITHIASIS: Primary | ICD-10-CM

## 2025-04-22 LAB
GLUCOSE BLD-MCNC: 161 MG/DL (ref 65–105)
GLUCOSE BLD-MCNC: 177 MG/DL (ref 65–105)

## 2025-04-22 PROCEDURE — 82947 ASSAY GLUCOSE BLOOD QUANT: CPT

## 2025-04-22 PROCEDURE — 2580000003 HC RX 258: Performed by: NURSE ANESTHETIST, CERTIFIED REGISTERED

## 2025-04-22 PROCEDURE — 3700000000 HC ANESTHESIA ATTENDED CARE: Performed by: SPECIALIST

## 2025-04-22 PROCEDURE — 2500000003 HC RX 250 WO HCPCS: Performed by: NURSE ANESTHETIST, CERTIFIED REGISTERED

## 2025-04-22 PROCEDURE — 3700000001 HC ADD 15 MINUTES (ANESTHESIA): Performed by: SPECIALIST

## 2025-04-22 PROCEDURE — 7100000001 HC PACU RECOVERY - ADDTL 15 MIN: Performed by: SPECIALIST

## 2025-04-22 PROCEDURE — 3600000004 HC SURGERY LEVEL 4 BASE: Performed by: SPECIALIST

## 2025-04-22 PROCEDURE — C1769 GUIDE WIRE: HCPCS | Performed by: SPECIALIST

## 2025-04-22 PROCEDURE — 2500000003 HC RX 250 WO HCPCS: Performed by: SPECIALIST

## 2025-04-22 PROCEDURE — C1758 CATHETER, URETERAL: HCPCS | Performed by: SPECIALIST

## 2025-04-22 PROCEDURE — 3600000014 HC SURGERY LEVEL 4 ADDTL 15MIN: Performed by: SPECIALIST

## 2025-04-22 PROCEDURE — 87186 SC STD MICRODIL/AGAR DIL: CPT

## 2025-04-22 PROCEDURE — 6360000002 HC RX W HCPCS: Performed by: SPECIALIST

## 2025-04-22 PROCEDURE — 2720000010 HC SURG SUPPLY STERILE: Performed by: SPECIALIST

## 2025-04-22 PROCEDURE — C1894 INTRO/SHEATH, NON-LASER: HCPCS | Performed by: SPECIALIST

## 2025-04-22 PROCEDURE — C2617 STENT, NON-COR, TEM W/O DEL: HCPCS | Performed by: SPECIALIST

## 2025-04-22 PROCEDURE — 52356 CYSTO/URETERO W/LITHOTRIPSY: CPT | Performed by: SPECIALIST

## 2025-04-22 PROCEDURE — 87086 URINE CULTURE/COLONY COUNT: CPT

## 2025-04-22 PROCEDURE — 87088 URINE BACTERIA CULTURE: CPT

## 2025-04-22 PROCEDURE — 7100000000 HC PACU RECOVERY - FIRST 15 MIN: Performed by: SPECIALIST

## 2025-04-22 PROCEDURE — 6360000002 HC RX W HCPCS: Performed by: NURSE ANESTHETIST, CERTIFIED REGISTERED

## 2025-04-22 PROCEDURE — 7100000011 HC PHASE II RECOVERY - ADDTL 15 MIN: Performed by: SPECIALIST

## 2025-04-22 PROCEDURE — 7100000010 HC PHASE II RECOVERY - FIRST 15 MIN: Performed by: SPECIALIST

## 2025-04-22 PROCEDURE — 2709999900 HC NON-CHARGEABLE SUPPLY: Performed by: SPECIALIST

## 2025-04-22 DEVICE — URETERAL STENT SET
Type: IMPLANTABLE DEVICE | Status: FUNCTIONAL
Brand: POLARIS™ ULTRA

## 2025-04-22 RX ORDER — HYDRALAZINE HYDROCHLORIDE 20 MG/ML
10 INJECTION INTRAMUSCULAR; INTRAVENOUS
Status: DISCONTINUED | OUTPATIENT
Start: 2025-04-22 | End: 2025-04-22 | Stop reason: HOSPADM

## 2025-04-22 RX ORDER — OXYCODONE HYDROCHLORIDE 5 MG/1
5 TABLET ORAL PRN
Status: DISCONTINUED | OUTPATIENT
Start: 2025-04-22 | End: 2025-04-22 | Stop reason: HOSPADM

## 2025-04-22 RX ORDER — MAGNESIUM HYDROXIDE 1200 MG/15ML
LIQUID ORAL CONTINUOUS PRN
Status: DISCONTINUED | OUTPATIENT
Start: 2025-04-22 | End: 2025-04-22 | Stop reason: HOSPADM

## 2025-04-22 RX ORDER — SODIUM CHLORIDE, SODIUM LACTATE, POTASSIUM CHLORIDE, CALCIUM CHLORIDE 600; 310; 30; 20 MG/100ML; MG/100ML; MG/100ML; MG/100ML
INJECTION, SOLUTION INTRAVENOUS
Status: DISCONTINUED | OUTPATIENT
Start: 2025-04-22 | End: 2025-04-22 | Stop reason: SDUPTHER

## 2025-04-22 RX ORDER — KETOROLAC TROMETHAMINE 30 MG/ML
INJECTION, SOLUTION INTRAMUSCULAR; INTRAVENOUS
Status: DISCONTINUED | OUTPATIENT
Start: 2025-04-22 | End: 2025-04-22 | Stop reason: SDUPTHER

## 2025-04-22 RX ORDER — MIDAZOLAM HYDROCHLORIDE 1 MG/ML
INJECTION, SOLUTION INTRAMUSCULAR; INTRAVENOUS
Status: DISCONTINUED | OUTPATIENT
Start: 2025-04-22 | End: 2025-04-22 | Stop reason: SDUPTHER

## 2025-04-22 RX ORDER — OXYBUTYNIN CHLORIDE 10 MG/1
10 TABLET, EXTENDED RELEASE ORAL DAILY
Qty: 7 TABLET | Refills: 0 | Status: SHIPPED | OUTPATIENT
Start: 2025-04-22 | End: 2025-04-29

## 2025-04-22 RX ORDER — OXYCODONE HYDROCHLORIDE 5 MG/1
10 TABLET ORAL PRN
Status: DISCONTINUED | OUTPATIENT
Start: 2025-04-22 | End: 2025-04-22 | Stop reason: HOSPADM

## 2025-04-22 RX ORDER — LABETALOL HYDROCHLORIDE 5 MG/ML
10 INJECTION, SOLUTION INTRAVENOUS
Status: DISCONTINUED | OUTPATIENT
Start: 2025-04-22 | End: 2025-04-22 | Stop reason: HOSPADM

## 2025-04-22 RX ORDER — FENTANYL CITRATE 50 UG/ML
INJECTION, SOLUTION INTRAMUSCULAR; INTRAVENOUS
Status: DISCONTINUED | OUTPATIENT
Start: 2025-04-22 | End: 2025-04-22 | Stop reason: SDUPTHER

## 2025-04-22 RX ORDER — PHENAZOPYRIDINE HYDROCHLORIDE 200 MG/1
200 TABLET, FILM COATED ORAL 3 TIMES DAILY PRN
Qty: 9 TABLET | Refills: 0 | Status: SHIPPED | OUTPATIENT
Start: 2025-04-22

## 2025-04-22 RX ORDER — NALOXONE HYDROCHLORIDE 0.4 MG/ML
INJECTION, SOLUTION INTRAMUSCULAR; INTRAVENOUS; SUBCUTANEOUS PRN
Status: DISCONTINUED | OUTPATIENT
Start: 2025-04-22 | End: 2025-04-22 | Stop reason: HOSPADM

## 2025-04-22 RX ORDER — PROPOFOL 10 MG/ML
INJECTION, EMULSION INTRAVENOUS
Status: DISCONTINUED | OUTPATIENT
Start: 2025-04-22 | End: 2025-04-22 | Stop reason: SDUPTHER

## 2025-04-22 RX ORDER — CEPHALEXIN 500 MG/1
500 CAPSULE ORAL 3 TIMES DAILY
Qty: 9 CAPSULE | Refills: 0 | Status: SHIPPED | OUTPATIENT
Start: 2025-04-22 | End: 2025-04-24 | Stop reason: SDUPTHER

## 2025-04-22 RX ORDER — SODIUM CHLORIDE 9 MG/ML
INJECTION, SOLUTION INTRAVENOUS PRN
Status: DISCONTINUED | OUTPATIENT
Start: 2025-04-22 | End: 2025-04-22 | Stop reason: HOSPADM

## 2025-04-22 RX ORDER — DROPERIDOL 2.5 MG/ML
0.62 INJECTION, SOLUTION INTRAMUSCULAR; INTRAVENOUS
Status: DISCONTINUED | OUTPATIENT
Start: 2025-04-22 | End: 2025-04-22 | Stop reason: HOSPADM

## 2025-04-22 RX ORDER — DIPHENHYDRAMINE HYDROCHLORIDE 50 MG/ML
12.5 INJECTION, SOLUTION INTRAMUSCULAR; INTRAVENOUS
Status: DISCONTINUED | OUTPATIENT
Start: 2025-04-22 | End: 2025-04-22 | Stop reason: HOSPADM

## 2025-04-22 RX ORDER — SODIUM CHLORIDE 0.9 % (FLUSH) 0.9 %
5-40 SYRINGE (ML) INJECTION EVERY 12 HOURS SCHEDULED
Status: DISCONTINUED | OUTPATIENT
Start: 2025-04-22 | End: 2025-04-22 | Stop reason: HOSPADM

## 2025-04-22 RX ORDER — DEXAMETHASONE SODIUM PHOSPHATE 10 MG/ML
INJECTION, SOLUTION INTRA-ARTICULAR; INTRALESIONAL; INTRAMUSCULAR; INTRAVENOUS; SOFT TISSUE
Status: DISCONTINUED | OUTPATIENT
Start: 2025-04-22 | End: 2025-04-22 | Stop reason: SDUPTHER

## 2025-04-22 RX ORDER — PROCHLORPERAZINE EDISYLATE 5 MG/ML
5 INJECTION INTRAMUSCULAR; INTRAVENOUS
Status: DISCONTINUED | OUTPATIENT
Start: 2025-04-22 | End: 2025-04-22 | Stop reason: HOSPADM

## 2025-04-22 RX ORDER — SODIUM CHLORIDE 0.9 % (FLUSH) 0.9 %
5-40 SYRINGE (ML) INJECTION PRN
Status: DISCONTINUED | OUTPATIENT
Start: 2025-04-22 | End: 2025-04-22 | Stop reason: HOSPADM

## 2025-04-22 RX ORDER — LIDOCAINE HYDROCHLORIDE 10 MG/ML
INJECTION, SOLUTION EPIDURAL; INFILTRATION; INTRACAUDAL; PERINEURAL
Status: DISCONTINUED | OUTPATIENT
Start: 2025-04-22 | End: 2025-04-22 | Stop reason: SDUPTHER

## 2025-04-22 RX ORDER — EPHEDRINE SULFATE/0.9% NACL/PF 25 MG/5 ML
SYRINGE (ML) INTRAVENOUS
Status: DISCONTINUED | OUTPATIENT
Start: 2025-04-22 | End: 2025-04-22 | Stop reason: SDUPTHER

## 2025-04-22 RX ORDER — ONDANSETRON 2 MG/ML
INJECTION INTRAMUSCULAR; INTRAVENOUS
Status: DISCONTINUED | OUTPATIENT
Start: 2025-04-22 | End: 2025-04-22 | Stop reason: SDUPTHER

## 2025-04-22 RX ORDER — LORAZEPAM 2 MG/ML
0.5 INJECTION INTRAMUSCULAR
Status: DISCONTINUED | OUTPATIENT
Start: 2025-04-22 | End: 2025-04-22 | Stop reason: HOSPADM

## 2025-04-22 RX ORDER — FENTANYL CITRATE 50 UG/ML
25 INJECTION, SOLUTION INTRAMUSCULAR; INTRAVENOUS EVERY 5 MIN PRN
Status: DISCONTINUED | OUTPATIENT
Start: 2025-04-22 | End: 2025-04-22 | Stop reason: HOSPADM

## 2025-04-22 RX ADMIN — SODIUM CHLORIDE, POTASSIUM CHLORIDE, SODIUM LACTATE AND CALCIUM CHLORIDE: 600; 310; 30; 20 INJECTION, SOLUTION INTRAVENOUS at 08:40

## 2025-04-22 RX ADMIN — EPHEDRINE SULFATE 10 MG: 5 INJECTION INTRAVENOUS at 09:50

## 2025-04-22 RX ADMIN — LIDOCAINE HYDROCHLORIDE 50 MG: 10 INJECTION, SOLUTION EPIDURAL; INFILTRATION; INTRACAUDAL; PERINEURAL at 09:28

## 2025-04-22 RX ADMIN — DEXAMETHASONE SODIUM PHOSPHATE 5 MG: 10 INJECTION INTRAMUSCULAR; INTRAVENOUS at 09:35

## 2025-04-22 RX ADMIN — MIDAZOLAM 2 MG: 1 INJECTION INTRAMUSCULAR; INTRAVENOUS at 09:19

## 2025-04-22 RX ADMIN — PHENYLEPHRINE HYDROCHLORIDE 100 MCG: 10 INJECTION INTRAVENOUS at 09:54

## 2025-04-22 RX ADMIN — WATER 2000 MG: 1 INJECTION INTRAMUSCULAR; INTRAVENOUS; SUBCUTANEOUS at 09:32

## 2025-04-22 RX ADMIN — PHENYLEPHRINE HYDROCHLORIDE 100 MCG: 10 INJECTION INTRAVENOUS at 09:42

## 2025-04-22 RX ADMIN — FENTANYL CITRATE 50 MCG: 50 INJECTION, SOLUTION INTRAMUSCULAR; INTRAVENOUS at 09:28

## 2025-04-22 RX ADMIN — ONDANSETRON 4 MG: 2 INJECTION, SOLUTION INTRAMUSCULAR; INTRAVENOUS at 09:58

## 2025-04-22 RX ADMIN — KETOROLAC TROMETHAMINE 15 MG: 30 INJECTION, SOLUTION INTRAMUSCULAR at 09:58

## 2025-04-22 RX ADMIN — FENTANYL CITRATE 50 MCG: 50 INJECTION, SOLUTION INTRAMUSCULAR; INTRAVENOUS at 09:19

## 2025-04-22 RX ADMIN — PROPOFOL 170 MG: 10 INJECTION, EMULSION INTRAVENOUS at 09:28

## 2025-04-22 ASSESSMENT — PAIN - FUNCTIONAL ASSESSMENT: PAIN_FUNCTIONAL_ASSESSMENT: 0-10

## 2025-04-22 ASSESSMENT — PAIN DESCRIPTION - DESCRIPTORS
DESCRIPTORS: ACHING
DESCRIPTORS: SORE

## 2025-04-22 ASSESSMENT — PAIN DESCRIPTION - LOCATION
LOCATION: VAGINA

## 2025-04-22 ASSESSMENT — PAIN SCALES - GENERAL
PAINLEVEL_OUTOF10: 3

## 2025-04-22 ASSESSMENT — COPD QUESTIONNAIRES: CAT_SEVERITY: MODERATE

## 2025-04-22 NOTE — OP NOTE
Operative Note      Patient: Radha Garcia  YOB: 1970  MRN: [id number]    Date of Procedure: 4/22/2025    Pre-Op Diagnosis Codes:      * Calculus of proximal left ureter [N20.1]    Post-Op Diagnosis: Same       Procedure(s):  HOLMIUM, CYSTOSCOPY, URETEROSCOPY, STENT EXCHANGE (6X24)    Surgeon(s):  Aamir Rankin MD    Assistant:   Resident: Rasta Cruz MD; Titi Crane MD    Anesthesia: General    Estimated Blood Loss (mL): Minimal    Complications: None    Specimens:   ID Type Source Tests Collected by Time Destination   1 : newly placed catheter Urine Urine, indwelling catheter CULTURE, URINE Aamir Rankin MD 4/22/2025 0939        Implants:  Left [id number] ureteral stent       Drains:   [REMOVED] Ureteral Drain/Stent 04/06/25 (Removed)       Findings:  Infection Present At Time Of Surgery (PATOS) (choose all levels that have infection present):  No infection present but urine sent for C&S to be sure  Other Findings: see below     Detailed Description of Procedure:   INDICATIONS FOR PROCEDURE:  Radha Garcia is a 54 y.o. female presents for a 10 mm Left sided proximal ureteral calculus.  Patient had undergone previous stent placement on 4/6/25.  Patient here today for definitive management in the form of ureteroscopy, holmium laser lithotripsy and/or stone basketing.     After the risks, benefits, alternatives, of the procedure were discussed with the patient, informed consent was obtained.  The patient elected to proceed.  Patient given Rocephin 1 gm IV on call to OR.     DETAILS OF THE PROCEDURE:  The patient was brought back from the preoperative holding area to the operating suite, and was transferred to the operating table where the patient lay in supine position. EPC's were in place, connected to the machine and the machine was turned on before induction.  General endotracheal anesthesia was induced, and patient was prepped and draped in standard surgical fashion

## 2025-04-22 NOTE — ANESTHESIA PRE PROCEDURE
Department of Anesthesiology  Preprocedure Note       Name:  Radha Garcia   Age:  54 y.o.  :  1970                                          MRN:  [id number]         Date:  2025      Surgeon: Surgeon(s):  Aamir Rankin MD    Procedure: Procedure(s):  HOLMIUM, CYSTOSCOPY, URETEROSCOPY, STENT EXCHANGE (6X24)    Medications prior to admission:   Prior to Admission medications    Medication Sig Start Date End Date Taking? Authorizing Provider   cephALEXin (KEFLEX) 500 MG capsule  25  Yes Amanda Dukes MD   oxyCODONE-acetaminophen (PERCOCET) 5-325 MG per tablet Take 1 tablet by mouth 2 times daily as needed. 3/31/25  Yes Amanda Dukes MD   traZODone (DESYREL) 100 MG tablet Take 1 tablet by mouth nightly 3/4/25  Yes Amanda Dukes MD   trospium (SANCTURA) 20 MG tablet Take 1 tablet by mouth 2 times daily (before meals) 25  Yes Kathy Tobar MD   ALPRAZolam (XANAX) 0.25 MG tablet Take 1 tablet by mouth every 12 hours as needed. 21  Yes Amanda Dukes MD   ondansetron (ZOFRAN-ODT) 4 MG disintegrating tablet  25   Amanda Dukes MD   tamsulosin (FLOMAX) 0.4 MG capsule Take 1 capsule by mouth daily  Patient not taking: Reported on 2025   Kathy Tobar MD   losartan (COZAAR) 25 MG tablet Take 1 tablet by mouth daily 4/10/25 5/10/25  Kathy Tobar MD   fluticasone-salmeterol (ADVAIR) 250-50 MCG/ACT AEPB diskus inhaler Inhale 1 puff into the lungs in the morning and 1 puff in the evening. 25   Gerardo Blanchard DO   gabapentin (NEURONTIN) 300 MG capsule Take 1 capsule by mouth in the morning and at bedtime.  Patient not taking: Reported on 2025    Amanda Dukes MD   insulin lispro, 1 Unit Dial, (HUMALOG KWIKPEN) 100 UNIT/ML SOPN Inject 3 Units into the skin 3 times daily (before meals)  Patient not taking: Reported on 2025  Jeremiah Turner MD   Insulin Pen Needle (MEIJER PEN NEEDLES) 31G X 6

## 2025-04-22 NOTE — DISCHARGE INSTRUCTIONS
Take Ibuprofen as directed for 24 hrs after stent pull.  Please stay hydrated.  Please call with questions.

## 2025-04-22 NOTE — PROGRESS NOTES
Discharge instructions discuss with daughter, verbalizes understanding. All belongings given to patient. Discharge per wheelchair in a stable condition with urethral stent in place.

## 2025-04-23 DIAGNOSIS — N20.0 BILATERAL NEPHROLITHIASIS: Primary | ICD-10-CM

## 2025-04-23 LAB
MICROORGANISM SPEC CULT: ABNORMAL
SERVICE CMNT-IMP: ABNORMAL
SPECIMEN DESCRIPTION: ABNORMAL

## 2025-04-24 ENCOUNTER — RESULTS FOLLOW-UP (OUTPATIENT)
Age: 55
End: 2025-04-24

## 2025-04-24 DIAGNOSIS — N39.0 URINARY TRACT INFECTION WITHOUT HEMATURIA, SITE UNSPECIFIED: Primary | ICD-10-CM

## 2025-04-24 RX ORDER — FLUCONAZOLE 150 MG/1
150 TABLET ORAL
Qty: 2 TABLET | Refills: 0 | Status: SHIPPED | OUTPATIENT
Start: 2025-04-24 | End: 2025-04-30

## 2025-04-24 RX ORDER — CEPHALEXIN 500 MG/1
500 CAPSULE ORAL 3 TIMES DAILY
Qty: 15 CAPSULE | Refills: 0 | Status: SHIPPED | OUTPATIENT
Start: 2025-04-24 | End: 2025-04-29

## 2025-05-29 ENCOUNTER — HOSPITAL ENCOUNTER (OUTPATIENT)
Age: 55
Setting detail: SPECIMEN
Discharge: HOME | End: 2025-05-29

## 2025-05-29 ENCOUNTER — HOSPITAL ENCOUNTER (OUTPATIENT)
Dept: GENERAL RADIOLOGY | Age: 55
Discharge: HOME | End: 2025-05-31
Payer: COMMERCIAL

## 2025-05-29 DIAGNOSIS — N20.0 BILATERAL NEPHROLITHIASIS: ICD-10-CM

## 2025-05-29 LAB
CALCIUM UR-MCNC: 9.4 MG/DL
CALCIUM, URINE: 85 MG/24 H (ref 100–300)
COLLECT DURATION TIME SPEC: 24 H
CREATINE 24H UR-MRATE: 873 MG/24 H (ref 740–1570)
CREATININE URINE: 96 MG/DL
SODIUM 24 HOUR URINE: 79 MMOL/24 H (ref 40–220)
SODIUM URINE: 87 MMOL/L
SPECIMEN VOL UR: 909 ML
URIC ACID 24 HOUR URINE: 494 MG/24 H (ref 250–750)
URIC ACID, UR: 54.3 MG/DL

## 2025-05-29 PROCEDURE — 74018 RADEX ABDOMEN 1 VIEW: CPT

## 2025-05-31 LAB
CITRIC ACID, U, 24HR: 166 MG/D (ref 320–1240)
CITRIC ACID, URINE: 183 MG/L
CITRIC ACID/CREATININE RATIO URINE: 187 MG/G
COLLECT DURATION TIME SPEC: 24 H
CREAT 24H UR-MCNC: 98 MG/DL
CREATININE URINE /24 HR: 891 MG/D (ref 500–1400)
SPECIMEN VOL ?TM UR: 909 ML

## 2025-06-01 LAB
COLLECT DURATION TIME SPEC: 24 H
CREAT 24H UR-MCNC: 95 MG/DL
CREATININE URINE /24 HR: 864 MG/D (ref 500–1400)
OXALATE 24 HOUR URINE: 15 MG/D (ref 13–40)
OXALATE URINE: 17 MG/L
SPECIMEN VOL ?TM UR: 909 ML

## 2025-06-02 ENCOUNTER — RESULTS FOLLOW-UP (OUTPATIENT)
Age: 55
End: 2025-06-02

## (undated) DEVICE — DRAPE,REIN 53X77,STERILE: Brand: MEDLINE

## (undated) DEVICE — DUAL LUMEN URETERAL ACCESS CATHETER: Brand: COOK

## (undated) DEVICE — TUBING, SUCTION, 3/16" X 10', STRAIGHT: Brand: MEDLINE

## (undated) DEVICE — SOLUTION IRRIG 1000ML 0.9% SOD CHL USP POUR PLAS BTL

## (undated) DEVICE — Device

## (undated) DEVICE — GLOVE ORANGE PI 8   MSG9080

## (undated) DEVICE — STRAP,POSITIONING,KNEE/BODY,FOAM,4X60": Brand: MEDLINE

## (undated) DEVICE — HYDROGEL COATED URETERAL DILATOR: Brand: NOTTINGHAM ONE-STEP

## (undated) DEVICE — SOLUTION IRRIG 3000ML STRL H2O USP UROMATIC PLAS CONT

## (undated) DEVICE — GUIDEWIRE URO L150CM DIA .035IN STIFF NIT HYDRPHL STR TIP

## (undated) DEVICE — GUIDEWIRE UROLOGICAL ANGLED 8 CM 0.035 INX150 CM ZIPWIRE

## (undated) DEVICE — SYRINGE MED 20ML STD CLR PLAS LUERLOCK TIP N CTRL DISP

## (undated) DEVICE — PROTECTOR ULN NRV PUR FOAM HK LOOP STRP ANATOMICALLY

## (undated) DEVICE — MHPB CYSTO PACK-LF: Brand: MEDLINE INDUSTRIES, INC.

## (undated) DEVICE — SYRINGE MED 10ML LUERLOCK TIP W/O SFTY DISP

## (undated) DEVICE — CYSTO/BLADDER IRRIGATION SET, REGULATING CLAMP

## (undated) DEVICE — STRIP,CLOSURE,WOUND,MEDI-STRIP,1/2X4: Brand: MEDLINE

## (undated) DEVICE — STRAP ARMBRD W1.5XL32IN FOAM STR YET SFT W/ HK AND LOOP

## (undated) DEVICE — ADAPTER URO SCP UROLOK LL

## (undated) DEVICE — MASTISOL ADHESIVE LIQ 2/3ML

## (undated) DEVICE — GOWN,SIRUS,NONRNF,SETINSLV,XL,20/CS: Brand: MEDLINE

## (undated) DEVICE — HOLMIUM LASER FIBER SINGLE USE FOR USE WITH RHAPSODY H-30 OR ODYSSEY 30 HOLMIUM LASER SYSTEM: Brand: COOK

## (undated) DEVICE — SYSTEM FLD COLL W/ FLX DRP SUPP AND DISP BG